# Patient Record
Sex: FEMALE | Race: WHITE | NOT HISPANIC OR LATINO | Employment: OTHER | URBAN - NONMETROPOLITAN AREA
[De-identification: names, ages, dates, MRNs, and addresses within clinical notes are randomized per-mention and may not be internally consistent; named-entity substitution may affect disease eponyms.]

---

## 2017-01-06 ENCOUNTER — AMBULATORY - GICH (OUTPATIENT)
Dept: FAMILY MEDICINE | Facility: OTHER | Age: 67
End: 2017-01-06

## 2017-01-09 ENCOUNTER — AMBULATORY - GICH (OUTPATIENT)
Dept: FAMILY MEDICINE | Facility: OTHER | Age: 67
End: 2017-01-09

## 2017-01-16 ENCOUNTER — OFFICE VISIT - GICH (OUTPATIENT)
Dept: FAMILY MEDICINE | Facility: OTHER | Age: 67
End: 2017-01-16

## 2017-01-16 DIAGNOSIS — R20.0 ANESTHESIA OF SKIN: ICD-10-CM

## 2017-01-17 ENCOUNTER — HOSPITAL ENCOUNTER (OUTPATIENT)
Dept: RADIOLOGY | Facility: OTHER | Age: 67
End: 2017-01-17
Attending: FAMILY MEDICINE

## 2017-01-17 DIAGNOSIS — R20.0 ANESTHESIA OF SKIN: ICD-10-CM

## 2017-01-18 ENCOUNTER — AMBULATORY - GICH (OUTPATIENT)
Dept: SCHEDULING | Facility: OTHER | Age: 67
End: 2017-01-18

## 2017-01-19 ENCOUNTER — OFFICE VISIT - GICH (OUTPATIENT)
Dept: FAMILY MEDICINE | Facility: OTHER | Age: 67
End: 2017-01-19

## 2017-01-19 DIAGNOSIS — R20.2 PARESTHESIA OF SKIN: ICD-10-CM

## 2017-01-19 DIAGNOSIS — R20.0 ANESTHESIA OF SKIN: ICD-10-CM

## 2017-01-20 ENCOUNTER — HOSPITAL ENCOUNTER (OUTPATIENT)
Dept: RADIOLOGY | Facility: OTHER | Age: 67
End: 2017-01-20
Attending: FAMILY MEDICINE

## 2017-01-20 DIAGNOSIS — R20.0 ANESTHESIA OF SKIN: ICD-10-CM

## 2017-01-20 DIAGNOSIS — R20.2 PARESTHESIA OF SKIN: ICD-10-CM

## 2017-01-26 ENCOUNTER — AMBULATORY - GICH (OUTPATIENT)
Dept: SCHEDULING | Facility: OTHER | Age: 67
End: 2017-01-26

## 2017-02-28 ENCOUNTER — AMBULATORY - GICH (OUTPATIENT)
Dept: SCHEDULING | Facility: OTHER | Age: 67
End: 2017-02-28

## 2017-02-28 ENCOUNTER — HISTORY (OUTPATIENT)
Dept: FAMILY MEDICINE | Facility: OTHER | Age: 67
End: 2017-02-28

## 2017-02-28 ENCOUNTER — OFFICE VISIT - GICH (OUTPATIENT)
Dept: FAMILY MEDICINE | Facility: OTHER | Age: 67
End: 2017-02-28

## 2017-02-28 DIAGNOSIS — G44.209 TENSION-TYPE HEADACHE, NOT INTRACTABLE: ICD-10-CM

## 2017-02-28 DIAGNOSIS — L98.9 DISORDER OF SKIN OR SUBCUTANEOUS TISSUE: ICD-10-CM

## 2017-02-28 DIAGNOSIS — M50.30 OTHER CERVICAL DISC DEGENERATION, UNSPECIFIED CERVICAL REGION: ICD-10-CM

## 2017-02-28 DIAGNOSIS — M54.17 RADICULOPATHY OF LUMBOSACRAL REGION: ICD-10-CM

## 2017-03-14 ENCOUNTER — AMBULATORY - GICH (OUTPATIENT)
Dept: SCHEDULING | Facility: OTHER | Age: 67
End: 2017-03-14

## 2017-03-15 ENCOUNTER — AMBULATORY - GICH (OUTPATIENT)
Dept: RADIOLOGY | Facility: OTHER | Age: 67
End: 2017-03-15

## 2017-03-15 DIAGNOSIS — M79.605 PAIN OF LEFT LEG: ICD-10-CM

## 2017-03-17 ENCOUNTER — HOSPITAL ENCOUNTER (OUTPATIENT)
Dept: RADIOLOGY | Facility: OTHER | Age: 67
End: 2017-03-17

## 2017-03-17 DIAGNOSIS — M79.605 PAIN OF LEFT LEG: ICD-10-CM

## 2017-03-21 ENCOUNTER — HISTORY (OUTPATIENT)
Dept: SURGERY | Facility: OTHER | Age: 67
End: 2017-03-21

## 2017-03-21 ENCOUNTER — AMBULATORY - GICH (OUTPATIENT)
Dept: SURGERY | Facility: OTHER | Age: 67
End: 2017-03-21

## 2017-03-21 DIAGNOSIS — L98.9 DISORDER OF SKIN OR SUBCUTANEOUS TISSUE: ICD-10-CM

## 2017-03-28 ENCOUNTER — OFFICE VISIT - GICH (OUTPATIENT)
Dept: SURGERY | Facility: OTHER | Age: 67
End: 2017-03-28

## 2017-03-28 DIAGNOSIS — Z85.828 PERSONAL HISTORY OF OTHER MALIGNANT NEOPLASM OF SKIN (CODE): ICD-10-CM

## 2017-03-28 DIAGNOSIS — Z48.3 AFTERCARE FOLLOWING SURGERY FOR NEOPLASM: ICD-10-CM

## 2017-03-31 ENCOUNTER — AMBULATORY - GICH (OUTPATIENT)
Dept: PHYSICAL THERAPY | Facility: OTHER | Age: 67
End: 2017-03-31

## 2017-03-31 DIAGNOSIS — M51.84 OTHER INTERVERTEBRAL DISC DISORDERS, THORACIC REGION: ICD-10-CM

## 2017-04-04 ENCOUNTER — HOSPITAL ENCOUNTER (OUTPATIENT)
Dept: PHYSICAL THERAPY | Facility: OTHER | Age: 67
Setting detail: THERAPIES SERIES
End: 2017-04-04

## 2017-04-04 DIAGNOSIS — M51.84 OTHER INTERVERTEBRAL DISC DISORDERS, THORACIC REGION: ICD-10-CM

## 2017-04-06 ENCOUNTER — HOSPITAL ENCOUNTER (OUTPATIENT)
Dept: PHYSICAL THERAPY | Facility: OTHER | Age: 67
Setting detail: THERAPIES SERIES
End: 2017-04-06

## 2017-04-11 ENCOUNTER — HOSPITAL ENCOUNTER (OUTPATIENT)
Dept: PHYSICAL THERAPY | Facility: OTHER | Age: 67
Setting detail: THERAPIES SERIES
End: 2017-04-11

## 2017-04-11 ENCOUNTER — COMMUNICATION - GICH (OUTPATIENT)
Dept: FAMILY MEDICINE | Facility: OTHER | Age: 67
End: 2017-04-11

## 2017-04-11 DIAGNOSIS — G44.209 TENSION-TYPE HEADACHE, NOT INTRACTABLE: ICD-10-CM

## 2017-04-24 ENCOUNTER — AMBULATORY - GICH (OUTPATIENT)
Dept: SCHEDULING | Facility: OTHER | Age: 67
End: 2017-04-24

## 2017-05-09 ENCOUNTER — AMBULATORY - GICH (OUTPATIENT)
Dept: SURGERY | Facility: OTHER | Age: 67
End: 2017-05-09

## 2017-05-09 ENCOUNTER — HOSPITAL ENCOUNTER (OUTPATIENT)
Dept: PHYSICAL THERAPY | Facility: OTHER | Age: 67
Setting detail: THERAPIES SERIES
End: 2017-05-09

## 2017-05-09 ENCOUNTER — HISTORY (OUTPATIENT)
Dept: SURGERY | Facility: OTHER | Age: 67
End: 2017-05-09

## 2017-05-09 DIAGNOSIS — Z85.828 PERSONAL HISTORY OF OTHER MALIGNANT NEOPLASM OF SKIN (CODE): ICD-10-CM

## 2017-05-11 ENCOUNTER — AMBULATORY - GICH (OUTPATIENT)
Dept: SCHEDULING | Facility: OTHER | Age: 67
End: 2017-05-11

## 2017-05-16 ENCOUNTER — AMBULATORY - GICH (OUTPATIENT)
Dept: SCHEDULING | Facility: OTHER | Age: 67
End: 2017-05-16

## 2017-05-16 ENCOUNTER — HOSPITAL ENCOUNTER (OUTPATIENT)
Dept: PHYSICAL THERAPY | Facility: OTHER | Age: 67
Setting detail: THERAPIES SERIES
End: 2017-05-16

## 2017-05-19 ENCOUNTER — OFFICE VISIT - GICH (OUTPATIENT)
Dept: SURGERY | Facility: OTHER | Age: 67
End: 2017-05-19

## 2017-05-19 ENCOUNTER — HISTORY (OUTPATIENT)
Dept: SURGERY | Facility: OTHER | Age: 67
End: 2017-05-19

## 2017-05-19 DIAGNOSIS — Z48.817 ENCOUNTER FOR SURGICAL AFTERCARE FOLLOWING SURGERY OF SKIN OR SUBCUTANEOUS TISSUE: ICD-10-CM

## 2017-05-19 DIAGNOSIS — Z85.828 PERSONAL HISTORY OF OTHER MALIGNANT NEOPLASM OF SKIN (CODE): ICD-10-CM

## 2017-07-17 ENCOUNTER — COMMUNICATION - GICH (OUTPATIENT)
Dept: FAMILY MEDICINE | Facility: OTHER | Age: 67
End: 2017-07-17

## 2017-07-17 DIAGNOSIS — G44.209 TENSION-TYPE HEADACHE, NOT INTRACTABLE: ICD-10-CM

## 2017-07-17 DIAGNOSIS — M50.30 OTHER CERVICAL DISC DEGENERATION, UNSPECIFIED CERVICAL REGION: ICD-10-CM

## 2017-08-21 ENCOUNTER — COMMUNICATION - GICH (OUTPATIENT)
Dept: FAMILY MEDICINE | Facility: OTHER | Age: 67
End: 2017-08-21

## 2017-08-21 DIAGNOSIS — G44.209 TENSION-TYPE HEADACHE, NOT INTRACTABLE: ICD-10-CM

## 2017-09-26 ENCOUNTER — COMMUNICATION - GICH (OUTPATIENT)
Dept: FAMILY MEDICINE | Facility: OTHER | Age: 67
End: 2017-09-26

## 2017-09-26 DIAGNOSIS — G44.209 TENSION-TYPE HEADACHE, NOT INTRACTABLE: ICD-10-CM

## 2017-10-11 ENCOUNTER — HOSPITAL ENCOUNTER (OUTPATIENT)
Dept: RADIOLOGY | Facility: OTHER | Age: 67
End: 2017-10-11
Attending: FAMILY MEDICINE

## 2017-10-11 ENCOUNTER — HISTORY (OUTPATIENT)
Dept: RADIOLOGY | Facility: OTHER | Age: 67
End: 2017-10-11

## 2017-10-11 DIAGNOSIS — Z12.31 ENCOUNTER FOR SCREENING MAMMOGRAM FOR MALIGNANT NEOPLASM OF BREAST: ICD-10-CM

## 2017-10-12 ENCOUNTER — OFFICE VISIT - GICH (OUTPATIENT)
Dept: FAMILY MEDICINE | Facility: OTHER | Age: 67
End: 2017-10-12

## 2017-10-12 ENCOUNTER — HISTORY (OUTPATIENT)
Dept: FAMILY MEDICINE | Facility: OTHER | Age: 67
End: 2017-10-12

## 2017-10-12 DIAGNOSIS — E78.5 HYPERLIPIDEMIA: ICD-10-CM

## 2017-10-12 DIAGNOSIS — M89.9 DISORDER OF BONE: ICD-10-CM

## 2017-10-12 DIAGNOSIS — Z23 ENCOUNTER FOR IMMUNIZATION: ICD-10-CM

## 2017-10-12 DIAGNOSIS — C82.50: ICD-10-CM

## 2017-10-12 DIAGNOSIS — M94.9 DISORDER OF CARTILAGE: ICD-10-CM

## 2017-10-12 DIAGNOSIS — M50.30 OTHER CERVICAL DISC DEGENERATION, UNSPECIFIED CERVICAL REGION: ICD-10-CM

## 2017-10-12 DIAGNOSIS — G47.00 INSOMNIA: ICD-10-CM

## 2017-10-12 ASSESSMENT — ANXIETY QUESTIONNAIRES
6. BECOMING EASILY ANNOYED OR IRRITABLE: NOT AT ALL
1. FEELING NERVOUS, ANXIOUS, OR ON EDGE: NOT AT ALL
3. WORRYING TOO MUCH ABOUT DIFFERENT THINGS: NOT AT ALL
4. TROUBLE RELAXING: NOT AT ALL
5. BEING SO RESTLESS THAT IT IS HARD TO SIT STILL: NOT AT ALL
7. FEELING AFRAID AS IF SOMETHING AWFUL MIGHT HAPPEN: NOT AT ALL
GAD7 TOTAL SCORE: 0
2. NOT BEING ABLE TO STOP OR CONTROL WORRYING: NOT AT ALL

## 2017-10-12 ASSESSMENT — PATIENT HEALTH QUESTIONNAIRE - PHQ9: SUM OF ALL RESPONSES TO PHQ QUESTIONS 1-9: 0

## 2017-10-26 ENCOUNTER — COMMUNICATION - GICH (OUTPATIENT)
Dept: FAMILY MEDICINE | Facility: OTHER | Age: 67
End: 2017-10-26

## 2017-10-26 DIAGNOSIS — G44.209 TENSION-TYPE HEADACHE, NOT INTRACTABLE: ICD-10-CM

## 2017-10-31 ENCOUNTER — AMBULATORY - GICH (OUTPATIENT)
Dept: FAMILY MEDICINE | Facility: OTHER | Age: 67
End: 2017-10-31

## 2017-10-31 DIAGNOSIS — G47.00 INSOMNIA: ICD-10-CM

## 2017-11-24 ENCOUNTER — COMMUNICATION - GICH (OUTPATIENT)
Dept: FAMILY MEDICINE | Facility: OTHER | Age: 67
End: 2017-11-24

## 2017-11-24 DIAGNOSIS — G44.209 TENSION-TYPE HEADACHE, NOT INTRACTABLE: ICD-10-CM

## 2017-11-24 DIAGNOSIS — M50.30 OTHER CERVICAL DISC DEGENERATION, UNSPECIFIED CERVICAL REGION: ICD-10-CM

## 2017-11-25 ENCOUNTER — HISTORY (OUTPATIENT)
Dept: EMERGENCY MEDICINE | Facility: OTHER | Age: 67
End: 2017-11-25

## 2017-11-25 ENCOUNTER — HOSPITAL ENCOUNTER (OUTPATIENT)
Dept: MEDSURG UNIT | Facility: OTHER | Age: 67
Setting detail: OBSERVATION
Discharge: HOME | End: 2017-11-26
Admitting: FAMILY MEDICINE

## 2017-11-25 DIAGNOSIS — R11.2 NAUSEA WITH VOMITING: ICD-10-CM

## 2017-11-25 DIAGNOSIS — R10.9 ABDOMINAL PAIN: ICD-10-CM

## 2017-11-25 LAB
A/G RATIO - HISTORICAL: 2.2 (ref 1–2)
ABSOLUTE BASOPHILS - HISTORICAL: 0 THOU/CU MM
ABSOLUTE EOSINOPHILS - HISTORICAL: 0.1 THOU/CU MM
ABSOLUTE IMMATURE GRANULOCYTES(METAS,MYELOS,PROS) - HISTORICAL: 0 THOU/CU MM
ABSOLUTE LYMPHOCYTES - HISTORICAL: 1.3 THOU/CU MM (ref 0.9–2.9)
ABSOLUTE MONOCYTES - HISTORICAL: 0.6 THOU/CU MM
ABSOLUTE NEUTROPHILS - HISTORICAL: 5 THOU/CU MM (ref 1.7–7)
ALBUMIN SERPL-MCNC: 5.1 G/DL (ref 3.5–5.7)
ALP SERPL-CCNC: 45 IU/L (ref 34–104)
ALT (SGPT) - HISTORICAL: 24 IU/L (ref 7–52)
ANION GAP - HISTORICAL: 11 (ref 5–18)
AST SERPL-CCNC: 23 IU/L (ref 13–39)
BACTERIA URINE: NORMAL BACTERIA/HPF
BASOPHILS # BLD AUTO: 0.1 %
BILIRUB SERPL-MCNC: 0.5 MG/DL (ref 0.3–1)
BILIRUB UR QL: NEGATIVE
BUN SERPL-MCNC: 11 MG/DL (ref 7–25)
BUN/CREAT RATIO - HISTORICAL: 16
CALCIUM SERPL-MCNC: 9.5 MG/DL (ref 8.6–10.3)
CHLORIDE SERPLBLD-SCNC: 96 MMOL/L (ref 98–107)
CLARITY, URINE: CLEAR CLARITY
CO2 SERPL-SCNC: 24 MMOL/L (ref 21–31)
COLOR UR: YELLOW COLOR
CREAT SERPL-MCNC: 0.69 MG/DL (ref 0.7–1.3)
EOSINOPHIL NFR BLD AUTO: 1.4 %
EPITHELIAL CELLS: NORMAL EPI/HPF
ERYTHROCYTE [DISTWIDTH] IN BLOOD BY AUTOMATED COUNT: 12.3 % (ref 11.5–15.5)
GFR IF NOT AFRICAN AMERICAN - HISTORICAL: >60 ML/MIN/1.73M2
GLOBULIN - HISTORICAL: 2.3 G/DL (ref 2–3.7)
GLUCOSE SERPL-MCNC: 110 MG/DL (ref 70–105)
GLUCOSE URINE: NEGATIVE MG/DL
HCT VFR BLD AUTO: 35.5 % (ref 33–51)
HEMOGLOBIN: 12.2 G/DL (ref 12–16)
IMMATURE GRANULOCYTES(METAS,MYELOS,PROS) - HISTORICAL: 0.3 %
KETONES UR QL: NEGATIVE MG/DL
LEUKOCYTE ESTERASE URINE: ABNORMAL
LIPASE SERPL-CCNC: 25.4 IU/L (ref 11–82)
LYMPHOCYTES NFR BLD AUTO: 18.2 % (ref 20–44)
MCH RBC QN AUTO: 33.1 PG (ref 26–34)
MCHC RBC AUTO-ENTMCNC: 34.4 G/DL (ref 32–36)
MCV RBC AUTO: 96 FL (ref 80–100)
MONOCYTES NFR BLD AUTO: 8.5 %
NEUTROPHILS NFR BLD AUTO: 71.5 % (ref 42–72)
NITRITE UR QL STRIP: NEGATIVE
OCCULT BLOOD,URINE - HISTORICAL: ABNORMAL
PH UR: 6 [PH]
PLATELET # BLD AUTO: 202 THOU/CU MM (ref 140–440)
PMV BLD: 9.1 FL (ref 6.5–11)
POTASSIUM SERPL-SCNC: 3.9 MMOL/L (ref 3.5–5.1)
PROT SERPL-MCNC: 7.4 G/DL (ref 6.4–8.9)
PROTEIN QUALITATIVE,URINE - HISTORICAL: NEGATIVE MG/DL
RBC - HISTORICAL: NORMAL /HPF
RED BLOOD COUNT - HISTORICAL: 3.69 MIL/CU MM (ref 4–5.2)
SODIUM SERPL-SCNC: 131 MMOL/L (ref 133–143)
SP GR UR STRIP: 1.02
UROBILINOGEN,QUALITATIVE - HISTORICAL: NORMAL EU/DL
WBC - HISTORICAL: NORMAL /HPF
WHITE BLOOD COUNT - HISTORICAL: 6.9 THOU/CU MM (ref 4.5–11)

## 2017-11-26 LAB
ABSOLUTE BASOPHILS - HISTORICAL: 0 THOU/CU MM
ABSOLUTE EOSINOPHILS - HISTORICAL: 0 THOU/CU MM
ABSOLUTE IMMATURE GRANULOCYTES(METAS,MYELOS,PROS) - HISTORICAL: 0 THOU/CU MM
ABSOLUTE LYMPHOCYTES - HISTORICAL: 1.2 THOU/CU MM (ref 0.9–2.9)
ABSOLUTE MONOCYTES - HISTORICAL: 0.6 THOU/CU MM
ABSOLUTE NEUTROPHILS - HISTORICAL: 3.8 THOU/CU MM (ref 1.7–7)
ANION GAP - HISTORICAL: 13 (ref 5–18)
BASOPHILS # BLD AUTO: 0.2 %
BUN SERPL-MCNC: 7 MG/DL (ref 7–25)
BUN/CREAT RATIO - HISTORICAL: 14
CALCIUM SERPL-MCNC: 8.9 MG/DL (ref 8.6–10.3)
CHLORIDE SERPLBLD-SCNC: 101 MMOL/L (ref 98–107)
CO2 SERPL-SCNC: 21 MMOL/L (ref 21–31)
CREAT SERPL-MCNC: 0.5 MG/DL (ref 0.7–1.3)
EOSINOPHIL NFR BLD AUTO: 0.5 %
ERYTHROCYTE [DISTWIDTH] IN BLOOD BY AUTOMATED COUNT: 12.2 % (ref 11.5–15.5)
GFR IF NOT AFRICAN AMERICAN - HISTORICAL: >60 ML/MIN/1.73M2
GLUCOSE SERPL-MCNC: 100 MG/DL (ref 70–105)
HCT VFR BLD AUTO: 34.6 % (ref 33–51)
HEMOGLOBIN: 11.9 G/DL (ref 12–16)
IMMATURE GRANULOCYTES(METAS,MYELOS,PROS) - HISTORICAL: 0.2 %
LYMPHOCYTES NFR BLD AUTO: 21.7 % (ref 20–44)
MCH RBC QN AUTO: 33.1 PG (ref 26–34)
MCHC RBC AUTO-ENTMCNC: 34.4 G/DL (ref 32–36)
MCV RBC AUTO: 96 FL (ref 80–100)
MONOCYTES NFR BLD AUTO: 10.3 %
NEUTROPHILS NFR BLD AUTO: 67.1 % (ref 42–72)
PLATELET # BLD AUTO: 205 THOU/CU MM (ref 140–440)
PMV BLD: 9.6 FL (ref 6.5–11)
POTASSIUM SERPL-SCNC: 3.6 MMOL/L (ref 3.5–5.1)
RED BLOOD COUNT - HISTORICAL: 3.6 MIL/CU MM (ref 4–5.2)
SODIUM SERPL-SCNC: 135 MMOL/L (ref 133–143)
WHITE BLOOD COUNT - HISTORICAL: 5.6 THOU/CU MM (ref 4.5–11)

## 2017-11-30 ENCOUNTER — OFFICE VISIT - GICH (OUTPATIENT)
Dept: FAMILY MEDICINE | Facility: OTHER | Age: 67
End: 2017-11-30

## 2017-11-30 ENCOUNTER — HISTORY (OUTPATIENT)
Dept: FAMILY MEDICINE | Facility: OTHER | Age: 67
End: 2017-11-30

## 2017-11-30 DIAGNOSIS — K52.9 NONINFECTIVE GASTROENTERITIS AND COLITIS: ICD-10-CM

## 2017-12-27 NOTE — PROGRESS NOTES
Patient Information     Patient Name MRN Sex DOB Hultman, Corinne G [id number] Female 1950      Progress Notes by Amaury Guevara MD at 10/12/2017  1:30 PM     Author:  Amaury Guevara MD Service:  (none) Author Type:  Physician     Filed:  10/12/2017  3:07 PM Encounter Date:  10/12/2017 Status:  Signed     :  Amaury Guevara MD (Physician)            HPI-Comes in today for comprehensive evaluation.  She's been feeling fine. She continues to work 2 days a week 2 weeks out of each month. She was diagnosed earlier this year with a herniated disc in the thoracic spine and has some numbness in the leg and some radicular pain around the lower rib area but this is well tolerated. Her meds remain the same. She has no new lumps or bumps, and she does her oncology follow-up with Dr. Cuenca in March. Overall she feels well.    She had a mammogram yesterday. She gets lab done through Dr. Cuenca. She is due for a flu shot.    She does note a significant sleep disturbance. She states that she falls asleep every night which he first goes to bed around 10 or so and then wakes up at 1:30 and often times can't get back to sleep. She said her mind is just thinking about all kinds of different things that seem to cause insomnia. No sleep apnea.    ROS:  See HPI.  Her weight is been stable. No fever, chills, or night sweats. Eyes, ENT, cardiopulmonary, GI, , rheumatologic, hematologic, skin, lymph, neurologic, psychiatric and endocrine are all negative other than what is noted in the history of present illness.    Past Medical History:     Diagnosis  Date     Actinic keratosis 2013     Closed fracture of lateral malleolus 2010     Degeneration of cervical intervertebral disc      Disorder of bone and cartilage, unspecified      Menopause age 50     Open fracture of lateral malleolus      Osteoporosis     on Fosamax for 17 years--stopped       Other malignant lymphomas, unspecified site,  extranodal and solid organ sites      Rheumatic fever 10+ years ago     Tension headache      Vertigo      Past Surgical History:      Procedure  Laterality Date     BREAST BIOPSY  mid     benign       CERVICAL FUSION  2012    C6-7 on left with discectomy       COLONOSCOPY SCREENING  age 55    normal       COLONOSCOPY SCREENING  12/10/2015    lnormal--repeat 10 years       LUMBAR LAMINECTOMY      L4-5       [place] ECTOP PREG BY LAPAROSCOPE Left     left tube and ovary removed       SKIN BIOPSY      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       TONSILLECTOMY       TUBAL LIGATION      FOR ECTOPIC PREGNANCY       Family History       Problem   Relation Age of Onset     Heart Disease  Mother       after cardiac bypass, age 71       Heart Disease  Father      congestive heart failure        Other  Other      headaches, otherwise A&W       Cancer-breast  Other      Social History       Substance Use Topics         Smoking status:   Never Smoker     Smokeless tobacco:   Never Used     Alcohol use   0.6 oz/week     1 Standard drinks or equivalent per week        Comment: once weekly she will have a glass of beer or wine with dinner.       No Known Allergies    Current Outpatient Prescriptions:      acetaminophen-caffeine-butalbital (FIORICET) 325-40-50 mg tablet, TAKE 1 TO 2 TABLETS BY MOUTH EVERY 4 TO 6 HOURS AS NEEDED FOR HEADACHE. MAX OF 6 DOSES PER DAY., Disp: 90 tablet, Rfl: 0     acetaminophen-codeine, 300-30 mg, (TYLENOL #3) tablet, TAKE 1 TO 2 TABLETS BY MOUTH EVERY 6 HOURS IF NEEDED, NO MORE THAN 4,000MG OF ACETAMINOPHEN DAILY, Disp: 100 tablet, Rfl: 3     CALCIUM CARBONATE (CALCIUM 600 ORAL), Take  by mouth 2 times daily., Disp: , Rfl:      celecoxib (CELEBREX) 200 mg capsule, Take 1 capsule by mouth once daily with a meal., Disp: 90 capsule, Rfl: 3     Cholecalciferol, Vitamin D3, (VITAMIN D-3) 2,000 unit tablet, Take 1 tablet by mouth once daily., Disp: , Rfl: 0      Cranberry Extract 500 mg cap, Take  by mouth once daily., Disp: , Rfl:      ibuprofen (ADVIL; MOTRIN) 200 mg tablet, 600-800 mg prn, Disp: , Rfl: 0     traZODone (DESYREL) 100 mg tablet, Take 0.5-1 tablets by mouth at bedtime if needed for Sleep., Disp: 90 tablet, Rfl: 4  Medications have been reviewed by me and are current to the best of my knowledge and ability.      EXAM:she is a pleasant healthy-appearing 67 y.o. female in no apparent distress.  She answers questions appropriately and appears well-kempt.  HEENT exam is within normal limits.  .  Neck is supple with good carotid pulses.  No bruits are heard.  No thyromegaly or adenopathy.  Lungs are clear with good air movement.  No rales, rhonchi, or wheezes are heard--  Breasts are normal in appearance, palpably free of masses.    There is no skin dimpling or nipple changes and axilla are negative. She has mild bilateral fibronodular changes. There is no tenderness.  Cardiac exam reveals a regular rhythm with no murmur or gallop appreciated.  Peripheral pulses are equal and strong  Abdomen-soft and nontender with no hepatosplenomegaly or masses.  Bowel sounds are normal and no bruits are heard  Back is straight with no scoliosis or CVA tenderness.       Extremities no edema. Good distal pulses.  Neurologic exam is intact and nonfocal  Skin is free of significant lesions.  No rashes are seen  Lymph nodes are nonpalpable in cervical, axillary, and inguinal areas. There are no supraclavicular or infraclavicular nodes in the spleen is not palpable  Psychiatric: Alert and oriented with normal mood and affect    Lab-these will be done through Dr. Cuenca at her oncology appointment. Lipid panel 2 years ago was normal.  Imaging-mammogram was done yesterday and the result is pending    Assessment-preventive healthcare-immunizations are all up-to-date and mammogram is been done and colonoscopy is up-to-date. Flu vaccine is given today.    History of lymphoma-in  remission    Osteopenia-she is taking calcium and vitamin D    Remote history of hyperlipidemia-lipids have been normal.    Cervical disc disease-stable    Insomnia-trial of trazodone    Thoracic disc disease-stable    Plan-flu vaccine given. She'll get labs done at her oncology appointment. Medications refilled. Trazodone given for sleep. Follow-up at yearly intervals.    Amaury Guevara MD ....................  10/12/2017   3:03 PM

## 2017-12-27 NOTE — PROGRESS NOTES
Patient Information     Patient Name MRN Sex DOB Hultman, Corinne G [id number] Female 1950      Progress Notes by Amy Benitez RN at 2017  6:48 PM     Author:  Amy Benitez RN Service:  (none) Author Type:  NURS- Registered Nurse     Filed:  2017  6:48 PM Date of Service:  2017  6:48 PM Status:  Signed     :  Amy Benitez RN (NURS- Registered Nurse)            Problem: GASTROINTESTINAL  Goal: MAINTAIN OR PROMOTE RETURN TO OPTIMAL GI FUNCTION  Outcome: Problem reviewed and goal still appropriate  Patient had been given Zofran and was still nauseated, Dr. Conrad notified and an order for Compazine ordered and given. Patient has been resting since. Pain managed with Dilaudid.  Amy Benitez RN   ........2017  6:46 PM

## 2017-12-28 NOTE — TELEPHONE ENCOUNTER
Patient Information     Patient Name MRN Sex DOB Hultman, Corinne G [id number] Female 1950      Telephone Encounter by Enid Aguirre RN at 2017  7:47 AM     Author:  Enid Aguirre RN Service:  (none) Author Type:  NURS- Registered Nurse     Filed:  2017  7:48 AM Encounter Date:  2017 Status:  Signed     :  Enid Aguirre RN (NURS- Registered Nurse)            This is a Refill request from: [facility]  Name of Medication: acetaminophen-caffeine-butalbital (FIORICET) 325-40-50 mg tablet  TAKE 1 TO 2 TABLETS BY MOUTH EVERY 4 TO 6 HOURS AS NEEDED FOR HEADACHE. MAX OF 6 DOSES PER DAY.  Quantity requested: 90  Last fill date: 17  Due for refill: yes  Last visit with AMAURY MOTA was on: 2017 in [facility]  PCP:  Amaury Mota MD  Controlled Substance Agreement:  None on file    Diagnosis r/t this medication request: tension headache     Unable to complete prescription refill per RN Medication Refill Policy.................... ENID AGUIRRE RN ....................  2017   7:47 AM

## 2017-12-28 NOTE — PROGRESS NOTES
Patient Information     Patient Name MRN Sex DOB Hultman, Corinne G [id number] Female 1950      Progress Notes by Tawny Andrade R.T. (ARRT) at 10/11/2017  2:38 PM     Author:  Tawny Andrade R.T. (ARRT) Service:  (none) Author Type:  (none)     Filed:  10/11/2017  2:38 PM Date of Service:  10/11/2017  2:38 PM Status:  Signed     :  Tawny Andrade R.T. (ARRT) ([facility] - Registered Radiologic Technologist)            Falls Risk Criteria:    Age 65 and older or under age 4        Sensory deficits    Poor vision    Use of ambulatory aides    Impaired judgment    Unable to walk independently    Meets High Risk criteria for falls:  Yes               1.  Do you have dizziness or vertigo?    no                    2.  Do you need help standing or walking?   no                 3.  Have you fallen within the last 6 months?    no           4.  Has the patient been fasting?      no       If any risks are marked Yes, the following interventions are utilized:    Do not leave patient unattended     Assist patient in the dressing room and bathroom    Have ambulatory aides available throughout procedure    Involve patient s family if available          '

## 2017-12-28 NOTE — PROGRESS NOTES
Patient Information     Patient Name MRN Sex DOB Hultman, Corinne G [id number] Female 1950      Progress Notes by Amaury Guevara MD at 2017 10:00 AM     Author:  Amaury Guevara MD Service:  (none) Author Type:  Physician     Filed:  2017 12:36 PM Encounter Date:  2017 Status:  Signed     :  Amaury Guevara MD (Physician)            SUBJECTIVE:  67 y.o. female who presents for follow-up of her hospitalization for presumed gastroenteritis. She's feeling fine now, back to normal. She went home on  and was back to work on Tuesday. 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 She's having no GI problems now and no other complaints.    She states that she was up for 2 nights pacing because of the pain.    Additional Review of Systems: see HPI:   No new symptoms otherwise    Past Medical History:     Diagnosis  Date     Actinic keratosis 2013     Closed fracture of lateral malleolus 2010     Degeneration of cervical intervertebral disc      Disorder of bone and cartilage, unspecified      Menopause age 50     Open fracture of lateral malleolus      Osteoporosis     on Fosamax for 17 years--stopped       Other malignant lymphomas, unspecified site, extranodal and solid organ sites 2011     Rheumatic fever 10+ years ago     Tension headache      Vertigo         Current Outpatient Prescriptions       Medication  Sig Dispense Refill     acetaminophen-caffeine-butalbital (FIORICET) 325-40-50 mg tablet TAKE 1 TO 2 TABLETS BY MOUTH EVERY 4 TO 6 HOURS AS NEEDED FOR HEADACHE. MAX OF 6 DOSES PER DAY. 90 tablet 3     acetaminophen-codeine, 300-30 mg, (TYLENOL #3) tablet TAKE 1-2 TABLETS BY MOUTH EVERY 6 HOURS IF NEEDED. NO MORE THAN 4,000MG OF ACETAMINOPHEN DAILY 100 tablet 0     CALCIUM CARBONATE (CALCIUM 600 ORAL) Take  by mouth 2 times daily.       celecoxib (CELEBREX) 200 mg capsule Take 1 capsule by mouth once daily with a meal. 90  capsule 3     Cholecalciferol, Vitamin D3, (VITAMIN D-3) 2,000 unit tablet Take 1 tablet by mouth once daily.  0     Cranberry Extract 500 mg cap Take  by mouth once daily.       ibuprofen (ADVIL; MOTRIN) 200 mg tablet 600-800 mg prn  0     ondansetron (ZOFRAN) 4 mg tablet Take 1 tablet by mouth every 8 hours if needed for Nausea/Vomiting. 30 tablet 0     POLYETHYLENE GLYCOL 3350 (MIRALAX ORAL) Take 1 Cap by mouth once daily if needed.       ranitidine (ZANTAC) 150 mg tablet Take 1 tablet by mouth 2 times daily. 30 tablet 0     zolpidem (AMBIEN) 5 mg tablet Take 0.5-1 tablets by mouth at bedtime if needed for Sleep. 30 tablet 5     No current facility-administered medications for this visit.      Medications have been reviewed by me and are current to the best of my knowledge and ability.      Allergies as of 11/30/2017      (No Known Allergies)        OBJECTIVE:  /80  Pulse 60  Wt 51.2 kg (112 lb 12.8 oz)  BMI 21.31 kg/m2  EXAM:  General Appearance: Pleasant, alert, appropriate appearance for age. No acute distress  Chest/Respiratory Exam: Normal.  Cardiovascular Exam: Normal.  Gastrointestinal Exam: Soft, nontender, no abnormal masses or organomegaly.  CT report and laboratory studies that were done in the hospital were reviewed with her.    ASSESSMENT/PLAN:  Gastroenteritis-resolved. She's been taking ranitidine and will gradually stop this. No new treatments are recommended and she will not need follow-up for this problem unless she has further difficulties.  Amauyr Guevara MD ....................  11/30/2017   12:35 PM

## 2017-12-28 NOTE — CARE COORDINATION
Patient Information     Patient Name MRN Sex DOB Hultman, Corinne G [id number] Female 1950      Case Mgmt by Mallory Avila RN at 2017  9:15 AM     Author:  Mallory Avila RN Service:  (none) Author Type:  NURS- Registered Nurse     Filed:  2017  9:15 AM Date of Service:  2017  9:15 AM Status:  Signed     :  Mallory Avila RN (NURS- Registered Nurse)            First and second level admission reviews completed by EHR.  Patient is appropriate for observation services.  Mallory Avila RN ....................  2017   9:15 AM

## 2017-12-28 NOTE — TELEPHONE ENCOUNTER
Patient Information     Patient Name MRN Sex DOB Hultman, Corinne G [id number] Female 1950      Telephone Encounter by Teresita Conrad RN at 10/30/2017 10:03 AM     Author:  Teresita Conrad RN Service:  (none) Author Type:  NURS- Registered Nurse     Filed:  10/30/2017 10:38 AM Encounter Date:  10/26/2017 Status:  Signed     :  Teresita Conrad RN (NURS- Registered Nurse)            ,  Pt is requesting refill for Fioricet.  Pt was last seen 10/12/2017. Medications appear to have been addressed, 10/03/2017 medication last filled #90.  Please review and advise or sign if appropriate.  Medication unique'd up per refill request    This is a Refill request from: [facility]  Name of Medication: Fioricet  Quantity requested: 90  Last fill date: 2017  Due for refill: yes  Last visit with AMAURY MOTA was on: 10/12/2017 in [facility]  PCP:  Amaury Mota MD  Controlled Substance Agreement: na  Diagnosis r/t this medication request: tension headaches     Unable to complete prescription refill per RN Medication Refill Policy.................... Teresita Conrad RN ....................  10/30/2017   10:04 AM

## 2017-12-28 NOTE — TELEPHONE ENCOUNTER
Patient Information     Patient Name MRN Sex DOB Hultman, Corinne G [id number] Female 1950      Telephone Encounter by Teresita Conrad RN at 2017  3:59 PM     Author:  Teresita Conrad RN Service:  (none) Author Type:  NURS- Registered Nurse     Filed:  2017  4:12 PM Encounter Date:  2017 Status:  Signed     :  Teresita Conrad RN (NURS- Registered Nurse)            This is a Refill request from: [facility]  Name of Medication: Fioricet  Quantity requested: 90  Last fill date: 2017  Due for refill: yes  Last visit with AMAURY MOTA was on: 2017 in [facility]  PCP:  Amaury Mota MD  Controlled Substance Agreement: None on file  Diagnosis r/t this medication request: Tension headache     Unable to complete prescription refill per RN Medication Refill Policy.................... Teresita Conrad RN ....................  2017   3:59 PM

## 2017-12-28 NOTE — TELEPHONE ENCOUNTER
Patient Information     Patient Name MRN Sex DOB Hultman, Corinne G [id number] Female 1950      Telephone Encounter by Cece Orta RN at 2017  2:11 PM     Author:  Cece Orta RN Service:  (none) Author Type:  NURS- Registered Nurse     Filed:  2017  2:38 PM Encounter Date:  2017 Status:  Signed     :  Cece Orta RN (NURS- Registered Nurse)            This is a Refill request from: [facility]  Name of Medication: acetaminophen-codeine 300-30mg  Quantity requested: 100  Last fill date: 17.  Due for refill: Yes  Last visit with AMAURY MOTA was on: 2017 in [facility]  PCP:  Amaury Mota MD  Controlled Substance Agreement:  Not on File  Diagnosis r/t this medication request: Degeneration of cervical intervertebral disc, Tension headache    Pt was last seen on 2017 for diagnosis listed above.  Due for follow up in 2017.    Unable to complete prescription refill per RN Medication Refill Policy.................... Cece Orta RN ....................  2017   2:13 PM

## 2017-12-28 NOTE — PROGRESS NOTES
Patient Information     Patient Name MRN Sex DOB Hultman, Corinne G [id number] Female 1950      Progress Notes by Leandra Alexandra PharmD at 2017  2:51 PM     Author:  Leandra Alexandra PharmD Service:  (none) Author Type:  PHARM- Pharmacist     Filed:  2017  7:22 PM Date of Service:  2017  2:51 PM Status:  Signed     :  Leandra Alexandra PharmD (PHARM- Pharmacist)            Pharmacy: Discharge Counseling and Medication Reconciliation    Corinne G Hultman  [address] [facility] [address]    Home Phone [phone]   Work Phone Not on file.   Mobile [phone]     67 y.o. female  PCP:Amaury Guevara MD    No Known Allergies    Discharge Counseling:    Pharmacist met with patient (and/or family) today to review the medication portion of the After Visit Summary (with an emphasis on NEW medications) and to address patient's questions/concerns.     Summary of Education: Met with patient at time of discharge to review new medications including ondansetron ODT and ranitidine. Discussed indications, directions for use, and possible side effects of each medication. Patient expressed understanding and all questions and concerns were addressed.    Materials Provided:   MedCounselor sheets printed from Clinical Pharmacology on: Ondansetron ODT and Ranitidine    Discharge Medication Reconciliation:    Leandra Alexandra PharmD has reviewed the patient's discharge medication orders and has compared them to the inpatient medication administration record and to what the patient was taking prior to admission- any discrepancies have been resolved.     It has been determined that the patient has an adequate supply of medications available or which can be obtained from the patient's preferred pharmacy, which she has confirmed as: [facility].     Thank you for the consult.     Leandra Alexandra PharmD ....................  2017   2:51 PM

## 2017-12-28 NOTE — PROGRESS NOTES
Patient Information     Patient Name MRN Sex DOB Hultman, Corinne G [id number] Female 1950      Progress Notes by Cynthia Altamirano RN at 2017  4:48 AM     Author:  Cynthia Altamirano RN Service:  (none) Author Type:  NURS- Registered Nurse     Filed:  2017  4:48 AM Date of Service:  2017  4:48 AM Status:  Signed     :  Cynthia Altamirano RN (NURS- Registered Nurse)            Problem: PAIN  Goal: VERBALIZES/DISPLAYS ADEQUATE COMFORT LEVEL OR BASELINE COMFORT LEVEL  Outcome: Problem reviewed and goal still appropriate  Patient started feeling better around midnight. Denies pain or nausea this morning. Tolerated ice chips all night and eating jello this morning.

## 2017-12-28 NOTE — PROGRESS NOTES
Patient Information     Patient Name MRN Sex DOB Hultman, Corinne G [id number] Female 1950      Progress Notes by Joan Perez RN at 2017  3:09 PM     Author:  Joan Perez RN Service:  (none) Author Type:  NURS- Registered Nurse     Filed:  2017  3:10 PM Date of Service:  2017  3:09 PM Status:  Signed     :  Joan Perez RN (NURS- Registered Nurse)            Discharge Note    Data:  Corinne G Hultman has been discharged home at 1500 via ambulatory accompanied by Family.      Action:  Written discharge/follow-up instructions were provided to patient. Prescriptions were written and sent with patient.  Belongings sent with patient. Medications from home sent with patient/family: No  Equipment none .     Response:  Patient verbalized understanding of discharge instructions, reason for discharge, and necessary follow-up appointments.

## 2017-12-28 NOTE — TELEPHONE ENCOUNTER
Patient Information     Patient Name MRN Sex DOB Hultman, Corinne G [id number] Female 1950      Telephone Encounter by Teresita Conrad RN at 2017  1:07 PM     Author:  Teresita Conrad RN Service:  (none) Author Type:  NURS- Registered Nurse     Filed:  2017  1:20 PM Encounter Date:  2017 Status:  Signed     :  Teresita Conrad RN (NURS- Registered Nurse)            ,  Pt is requesting a refill of Tylenol #3.  Last filled 2017 #100 #3.  Pt was last seen 10/12/2017 and instructed to follow up at yearly intervals.  Please review and sign if appropriate.    This is a Refill request from: [facility]  Name of Medication: Tylenol #3  Quantity requested: 100  Last fill date: 2017  Due for refill: yes  Last visit with AMAURY MOTA was on: 10/12/2017 in [facility]  PCP:  Amaury Mota MD  Controlled Substance Agreement:  None signed   Diagnosis r/t this medication request: Cervical disc disease.     Unable to complete prescription refill per RN Medication Refill Policy.................... Teresita Conrad RN ....................  2017   1:10 PM

## 2017-12-28 NOTE — PROGRESS NOTES
Patient Information     Patient Name MRN Sex DOB Hultman, Corinne G [id number] Female 1950      Progress Notes by Tania Sevilla RN at 2017 11:35 AM     Author:  Tania Sevilla RN Service:  (none) Author Type:  NURS- Registered Nurse     Filed:  2017 11:35 AM Date of Service:  2017 11:35 AM Status:  Signed     :  Tania Sevilla RN (NURS- Registered Nurse)            Admission Note    Data:  Corinne G Hultman admitted to [facility] from emergency department via cart.      Action:  Family and Dr. Conrad have been notified of admission.      Response:  Patient tolerated transfer. and Patient is stable.

## 2017-12-29 NOTE — ED NOTES
Patient Information     Patient Name MRN Sex DOB Hultman, Corinne G [id number] Female 1950      ED Nursing Note by Griselda Delgado RN at 2017 10:58 AM     Author:  Griselda Delgado RN Service:  (none) Author Type:  NURS- Registered Nurse     Filed:  2017 10:59 AM Date of Service:  2017 10:58 AM Status:  Signed     :  Griselda Delgado RN (NURS- Registered Nurse)            ADMISSION/TRANSPORT NOTE    Corinne G Hultman will be transported to [facility] by cart with .    Transport team included: ED Tech    Report called to Maddie MCCABE    Pain Level:   Acute Pain Intensity (0-10): 6  Functional Pain Scale (0-5): Intolerable (and can't use telephone, watch TV, or read)  Post Intervention Intensity (0-10): 3

## 2017-12-29 NOTE — H&P
Patient Information     Patient Name MRN Sex DOB Hultman, Corinne G [id number] Female 1950      H&P by John Conrad MD at 2017 12:28 PM     Author:  John Conrad MD Service:  (none) Author Type:  Physician     Filed:  2017  1:04 PM Date of Service:  2017 12:28 PM Status:  Signed     :  John Conrad MD (Physician)            ADMISSION HISTORY AND PHYSICAL    Date of Face to Face Service 2017     Corinne G Hultman   [address] [place] [address]  67 y.o. female  Admission Date/Time: 2017  6:37 AM    Primary Care Provider / Referring Physician:  Amaury Guevara MD    Informant:  patient    CHIEF COMPLAINT:  Abdominal pain     HPI:  Patient reports five-day history of colicky abdominal pain. It has been migratory. She is not really sure if it was any worse with food or liquid intake but does report that it seemed to improve with bowel movements. Her last normal bowel movement was last Tuesday. Felt quite a bit better after that. Then symptoms started to worsen by Thursday. Did have a small bowel movement yesterday. Has been passing flatus. Reports her bowel movements have been normal consistency and have not been black or bloody. She has had some nausea but no vomiting. No fevers or chills. No known sick contacts. Over the past 2 days symptoms have been more constant and she has not been able to sleep.     REVIEW OF SYSTEMS:  A comprehensive review of systems was negative except for items noted in HPI/Subjective.    Patient Active Problem List      Diagnosis Date Noted     Gastroenteritis 2017     Generalized abdominal pain 2017     History of basal cell cancer 2017     Hyperlipidemia 2015     VERTIGO 2012     DISC DISEASE, CERVICAL 2012     Diffuse follicle center lymphoma (HC) 2011     OSTEOPENIA      HEADACHE, TENSION      Past Medical History:     Diagnosis  Date     Actinic keratosis  2013     Closed fracture of lateral malleolus 2010     Degeneration of cervical intervertebral disc      Disorder of bone and cartilage, unspecified      Menopause age 50     Open fracture of lateral malleolus      Osteoporosis     on Fosamax for 17 years--stopped       Other malignant lymphomas, unspecified site, extranodal and solid organ sites      Rheumatic fever 10+ years ago     Tension headache      Vertigo      Past Surgical History:      Procedure  Laterality Date     BREAST BIOPSY  mid     benign       CERVICAL FUSION  2012    C6-7 on left with discectomy       COLONOSCOPY SCREENING  age 55    normal       COLONOSCOPY SCREENING  12/10/2015    lnormal--repeat 10 years       LUMBAR LAMINECTOMY  1984    L4-5       CA TX ECTOP PREG BY LAPAROSCOPE Left     left tube and ovary removed       SKIN BIOPSY      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       TONSILLECTOMY       TUBAL LIGATION      FOR ECTOPIC PREGNANCY       Family History       Problem   Relation Age of Onset     Heart Disease  Mother       after cardiac bypass, age 71       Heart Disease  Father      congestive heart failure        Other  Other      headaches, otherwise A&W       Cancer-breast  Other      Social History Narrative     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        Drug use:   No      Sexual activity:   Yes      Partners:  Male        PREADMISSION MEDICATIONS:  Prescriptions Prior to Admission     Medication  Sig     acetaminophen-caffeine-butalbital (FIORICET) 325-40-50 mg tablet TAKE 1 TO 2 TABLETS BY MOUTH EVERY 4 TO 6 HOURS AS NEEDED FOR HEADACHE. MAX  "OF 6 DOSES PER DAY.     acetaminophen-codeine, 300-30 mg, (TYLENOL #3) tablet TAKE 1 TO 2 TABLETS BY MOUTH EVERY 6 HOURS IF NEEDED, NO MORE THAN 4,000MG OF ACETAMINOPHEN DAILY     CALCIUM CARBONATE (CALCIUM 600 ORAL) Take  by mouth 2 times daily.     celecoxib (CELEBREX) 200 mg capsule Take 1 capsule by mouth once daily with a meal.     Cholecalciferol, Vitamin D3, (VITAMIN D-3) 2,000 unit tablet Take 1 tablet by mouth once daily.     Cranberry Extract 500 mg cap Take  by mouth once daily.     ibuprofen (ADVIL; MOTRIN) 200 mg tablet 600-800 mg prn     POLYETHYLENE GLYCOL 3350 (MIRALAX ORAL) Take 1 Cap by mouth once daily if needed.     zolpidem (AMBIEN) 5 mg tablet Take 0.5-1 tablets by mouth at bedtime if needed for Sleep.       ALLERGIES/SENSITIVITIES:   No Known Allergies    PHYSICAL EXAM:    General Appearance:   NAD at present    /85  Pulse 84  Temp 96.9  F (36.1  C)  Resp 16  Ht 1.549 m (5' 1\")  Wt 52.3 kg (115 lb 4.8 oz)  SpO2 100%  Breastfeeding? No  BMI 21.79 kg/m2  Body mass index is 21.79 kg/(m^2).  EYES: EOMI, PERRl  HEAD, EARS, NOSE, MOUTH, AND THROAT: NCAT, TMs nl, OP/NP nl  NECK: THyroid without enlargement or nodularity  RESPIRATORY: Clear to ascultation bilaterally.  No increased respiratory effort.   CARDIOVASCULAR: Regular rate and rhythm.  No murmurs rubs or gallops heard.   GASTROINTESTINAL: Abdomen Soft, no distention. She is diffusely tender most prominently in lower left quadrant and lower midline. She does however have some tenderness in the right upper quadrant and right lower quadrants.  No masses, rebound or guarding. Bowel sounds slightly hypoactive.  MUSCULOSKELETAL: No synovitis.  Muscle strength age and body habitus appropriate as well as equal and even.   LYMPHATIC: No lymphadenopathy  SKIN/HAIR/NAILS: Good skin turgor. No abnormal bruising or bleeding    Laboratory:  Recent Labs       11/25/17   0715   WBC  6.9   HGB  12.2   MCV  96   PLT  202       Recent Labs       " 11/25/17   0715   SODIUM  131 L   POTASSIUM  3.9   CHLORIDE  96 L   [id number]  24   BUN  11   CREATININE  0.69 L   GLUCOSE  110 H   CALCIUM  9.5       Recent Labs       11/25/17   0715   ALBUMIN  5.1   BILITOTAL  0.5   ALT  24   AST  23   PROTEIN  7.4     Exam:CT ABDOMEN PELVIS W     History: Abdominal pain     Comparisons:None.     Technique: CT scan of the abdomen and pelvis with IV contrast. Sagittal coronal reconstructions were obtained     Findings: The lung bases are clear. The liver is free of masses or biliary ductal enlargement. No calcified gallstones are seen. The spleen and pancreas are normal. There are no adrenal masses. Right kidney has an extrarenal pelvis with a small benign cyst. The Left kidneys show no evidence of masses or hydronephrosis. In the pelvis, the bladder and rectum appear normal the uterus is normal in size there is some free fluid in the pelvic cul-de-sac. Intraperitoneally the appendix was not seen. There is gaseous distention of colonic loops. No intraperitoneal masses or inflammatory changes are seen. There are degenerative changes present in the thoracic spine.         Impression: Gaseous distention of the colonic loops. The cecum appears to be on a mesentery and lies a new the midline of the abdomen.  2. The appendix was not visualized  3. There is a benign cyst in the right kidney and a prominent right extrarenal pelvis no calculi are visualized.  4. Some free fluid in the pelvic cul-de-sac    Additional Comments:    Principal Problem:    Gastroenteritis  Active Problems:    Generalized abdominal pain    Patient presents with generalized abdominal pain of 4-5 days duration. It has improved with bowel movements but CT scan does not show obvious constipation. We have seen quite a bit of viral gastroenteritis which this certainly could be but she has not had typical symptoms of vomiting or diarrhea. Her lab work and CT are reassuring. We will treat her with IV fluids to allow for  bowel rest, serial abdominal exams and pain medications. Patient will also be given IV Pepcid. Patient will be placed on observation to monitor for any new or worsening symptoms.    Fluids/lites nutrition - normal saline at 75 mL an hour for now. Patient appears euvolemic.    DVT prophylaxis - Lovenox subcutaneous    Code status - full      John Conrad MD

## 2017-12-29 NOTE — DISCHARGE SUMMARY
Patient Information     Patient Name MRN Sex DOB Hultman, Corinne G [id number] Female 1950      Discharge Summaries by John Conrad MD at 2017  2:01 PM     Author:  John Conrad MD Service:  (none) Author Type:  Physician     Filed:  2017  2:05 PM Date of Service:  2017  2:01 PM Status:  Signed     :  John Conrad MD (Physician)            HOSPITAL DISCHARGE SUMMARY    Patient Name: Corinne G Hultman  YOB: 1950  Age: 67 y.o.  Medical Record Number: [id number]  Primary Physician: Amaury Guevara MD  Phone: [phone]  Admission Date: 2017  Discharge Date: 2017     She will be discharged from [facility] to home.    PRINCIPAL DISCHARGE DIAGNOSIS: Abdominal pain    Principal Problem:    Gastroenteritis  Active Problems:    Generalized abdominal pain    BRIEF HOSPITAL COURSE: This 67 y.o. female presented with 4-5 days history of crampy abdominal pain. Pain seemed to be worse with eating and slightly better with bowel movement. She did have nausea and an episode of vomiting. No diarrhea, in fact was more constipated. Labs and CT scan are reassuring. She is admitted for pain control and bowel rest. After a couple doses of Dilaudid she felt much better. Antiemetics controlled her nausea nicely.  Last dosage of pain medications was about 24 hours ago. Pain resolved in the middle of the night last night. This morning was able to tolerate full liquid breakfast and soft regular diet for lunch with no recurrence of symptoms. Abdominal exam this morning and on discharge was completely benign.  Bowel sounds were normoactive and she was passing flatus. Patient will be discharged with some ranitidine and Zofran although I do not think she'll require either. She'll follow-up with any return in symptoms.    She is advised to maintain a high-fiber diet and plenty of fluids to prevent chronic  "constipation.    PROCEDURES PERFORMED DURING HOSPITALIZATION: None    COMPLICATIONS IN HOSPITAL: None    PERTINENT FINDINGS/RESULTS AT DISCHARGE: /75  Pulse 79  Temp 98.8  F (37.1  C)  Resp 18  Ht 1.549 m (5' 1\")  Wt 53.5 kg (117 lb 15.1 oz)  SpO2 98%  Breastfeeding? No  BMI 22.29 kg/m2   Patient Vitals for the past 72 hrs:   Weight   11/26/17 0213 53.5 kg (117 lb 15.1 oz)   11/25/17 1108 52.3 kg (115 lb 4.8 oz)   11/25/17 0640 52.6 kg (116 lb)   Respiratory: Clear to auscultation bilaterally  Cardiovascular: Regular rate and rhythm  GI: Abdomen Soft, non-tender.  No masses, rebound or guarding. Bowel sounds active    Latest Laboratory Results:  Chem:  Recent Labs        11/26/17 0428  11/25/17   0715   SODIUM  135  131 L   POTASSIUM  3.6  3.9   CREATININE  0.50 L  0.69 L     WBC/Hgb:  Recent Labs        11/26/17 0428  11/25/17   0715   WBC  5.6  6.9   HGB  11.9 L  12.2     INR:  No results for input(s): INR in the last 720 hours.      IMPORTANT PENDING TEST RESULTS:       These Lab Items may not have been resulted by the time the patient was discharged:            None          CONDITION AT DISCHARGE:    Improving    DISCHARGE ORDERS     Your Home Medicines      START taking these medicines       Instructions    ondansetron 4 mg tablet   For diagnoses:  Nausea and vomiting, intractability of vomiting not specified, unspecified vomiting type   Commonly known as:  ZOFRAN    Take 1 tablet by mouth every 8 hours if needed for Nausea/Vomiting.       ranitidine 150 mg tablet   For diagnoses:  Abdominal pain, unspecified abdominal location, Nausea and vomiting, intractability of vomiting not specified, unspecified vomiting type   Commonly known as:  ZANTAC    Take 1 tablet by mouth 2 times daily.         CONTINUE taking these medicines       Instructions    acetaminophen-caffeine-butalbital 325-40-50 mg tablet   For diagnoses:  Tension headache   Commonly known as:  FIORICET    TAKE 1 TO 2 TABLETS BY " MOUTH EVERY 4 TO 6 HOURS AS NEEDED FOR HEADACHE. MAX OF 6 DOSES PER DAY.       acetaminophen-codeine (300-30 mg) tablet   For diagnoses:  Degeneration of cervical intervertebral disc, Tension headache   Commonly known as:  TYLENOL #3    TAKE 1 TO 2 TABLETS BY MOUTH EVERY 6 HOURS IF NEEDED, NO MORE THAN 4,000MG OF ACETAMINOPHEN DAILY       CALCIUM 600 ORAL    Take  by mouth 2 times daily.       celecoxib 200 mg capsule   For diagnoses:  Degeneration of cervical intervertebral disc   Commonly known as:  CeleBREX    Take 1 capsule by mouth once daily with a meal.       Cranberry Extract 500 mg Cap    Take  by mouth once daily.       ibuprofen 200 mg tablet   Commonly known as:  ADVIL; MOTRIN    600-800 mg prn       MIRALAX ORAL    Take 1 Cap by mouth once daily if needed.       Vitamin D-3 2,000 unit tablet   Generic drug:  Cholecalciferol (Vitamin D3)    Take 1 tablet by mouth once daily.       zolpidem 5 mg tablet   For diagnoses:  Insomnia, unspecified type   Commonly known as:  AMBIEN    Take 0.5-1 tablets by mouth at bedtime if needed for Sleep.   Doctor's comments:  Will do PA if needed            Where to get your medicines      These medications were sent to [id number] - [place], [place] - [address] - [phone]  [address] [facility] [address]     Phone:  [phone]      ondansetron 4 mg tablet     ranitidine 150 mg tablet            After Discharge Orders and Instructions     Primary Care Provider follow up appointment(s)       Amaury Guevara MD         When to Follow Up:  within 5 days       Regular Diet       Eat a wide variety of foods, including fruits and vegetable, dairy, grains and meats.  Limit the amount of solid fat such as butter, margarine and shortening.  Get most of your fat sources from fish, nuts and vegetable oils.       Up as Tolerated       Get regular activity and try to walk for a total of 30 minutes per day.  Start by  walking for 5 to 10 minutes at one time and slowly build to walking for 30 minutes one time.  Rest is also an important part of healing.  Slowly return to your regular level of activity. Save your energy by spreading out activities that make you tired.  Rest as needed.         When should you be concerned?       Your health care provider is:  Amaury Guevara MD    Please call your health care provider if:    - you feel you are getting worse or having an increase in problems    - fever greater than 101 degrees  - increasing shortness of breath  - any signs of infection (increasing redness, swelling, tenderness, warmth, change in appearance, or  increased drainage)  - blood in your urine or stool  - coughing or vomiting blood  - nausea (upset stomach) and vomiting and/or diarrhea that will not stop  - severe pain that is not relieved by medicine, rest or ice    Call 911 if you feel you are having a medical emergency.       Why you were at the hospital       The reason(s) you were in the hospital is/are you had significant abdominal pain and symptoms most likely due to viral infection.  We gave you some IV fluid and bowel rest and treated your pain with a couple doses of pain medications and everything went away.  If your symptoms return you need to be seen again.    The ranitidine can help heal your stomach and the lining of your intestines but I doubt you will need it long term.    The zofran would be useful if any nausea or vomiting returned.                 FOLLOW-UP: She should see Amaury Guevara MD in 1w.    Specialty follow-up: None    Total time spent for discharge on date of discharge: >30 minutes  I saw the patient on the date of discharge.

## 2017-12-29 NOTE — ED PROVIDER NOTES
Patient Information     Patient Name MRN Sex DOB Hultman, Corinne G [id number] Female 1950      ED Provider Note by Javier Valencia MD at 2017  7:07 AM     Author:  Javier Valencia MD Service:  (none) Author Type:  Physician     Filed:  2017 10:45 AM Date of Service:  2017  7:07 AM Status:  Signed     :  Javier Valencia MD (Physician)            PATIENT:  Corinne G Hultman       67 y.o.       female       MRN #:  [id number]    CHIEF COMPLAINT:  Abdominal Pain      HPI Comments: This is a 68 yo female who denies significant past medical history presenting with acute worsening diffuse abdominal pain which started 5 days ago associated with bloating sensation and has Worse in the past 2 days. Patient states she did not get any rest last night because of the pain pacing in her room back and forth. She states the pain eases up only to come back with severe intensity. She describes the pain as a gas pain. She states she had small bowel movement yesterday but she hasn't passed gas since yesterday. She denies increased burping, nausea or vomiting or chills or body aches. She denies any bowel suture history of bowel obstruction or ischemia. Patient also denies history of arrhythmia or cardiovascular disease.       ALLERGIES:  Review of patient's allergies indicates no known allergies.    CURRENT MEDICATIONS:      acetaminophen-caffeine-butalbital (FIORICET) 325-40-50 mg tablet   acetaminophen-codeine, 300-30 mg, (TYLENOL #3) tablet   CALCIUM CARBONATE (CALCIUM 600 ORAL)   celecoxib (CELEBREX) 200 mg capsule   Cholecalciferol, Vitamin D3, (VITAMIN D-3) 2,000 unit tablet   Cranberry Extract 500 mg cap   ibuprofen (ADVIL; MOTRIN) 200 mg tablet   POLYETHYLENE GLYCOL 3350 (MIRALAX ORAL)   zolpidem (AMBIEN) 5 mg tablet     PROBLEM LIST:       History of basal cell cancer      Hyperlipidemia      VERTIGO      DISC DISEASE, CERVICAL      Diffuse follicle center lymphoma (HC)       OSTEOPENIA      HEADACHE, TENSION        PAST MEDICAL HISTORY:    Past Medical History:     Diagnosis  Date     Actinic keratosis 2013     Closed fracture of lateral malleolus 2010     Degeneration of cervical intervertebral disc      Disorder of bone and cartilage, unspecified      Menopause age 50     Open fracture of lateral malleolus      Osteoporosis      Other malignant lymphomas, unspecified site, extranodal and solid organ sites      Rheumatic fever 10+ years ago     Tension headache      Vertigo      PAST SURGICAL HISTORY:    Past Surgical History:      Procedure  Laterality Date     Breast biopsy  mid      Cervical fusion  2012     Colonoscopy screening  age 55     Colonoscopy screening  12/10/2015     Lumbar laminectomy  1984     Pr tx ectop preg by laparoscope Left 1974     Skin biopsy       Tonsillectomy       Tubal ligation       FAMILY HISTORY:    Family History       Problem   Relation Age of Onset     Heart Disease  Mother       after cardiac bypass, age 71       Heart Disease  Father      congestive heart failure        Other  Other      headaches, otherwise A&W       Cancer-breast  Other      SOCIAL HISTORY:  Marital Status:   [2]  Employment Status:  Part Time [2]   Occupation:    Substance Use:  Smoking status: Never Smoker                                                              Smokeless status: Never Used                      Alcohol use: Yes           0.6 oz/week     1 Standard drinks or equivalent per week     Comment: once weekly she will have a glass of beer               or wine with dinner.        Review of Systems   Constitutional: Negative for chills, fatigue and fever.   Respiratory: Negative for cough and shortness of breath.    Cardiovascular: Negative for chest pain, palpitations and leg swelling.   Gastrointestinal: Positive for abdominal distention and abdominal pain. Negative for diarrhea, nausea and vomiting.   All other systems  "reviewed and are negative.       Patient Vitals for the past 24 hrs:   BP Temp Pulse Resp SpO2 Height Weight   11/25/17 1030 111/67 97.3  F (36.3  C) - - 97 % - -   11/25/17 1015 - - - - 92 % - -   11/25/17 1000 - - - - 100 % - -   11/25/17 0945 - - - - 100 % - -   11/25/17 0930 115/70 - - - 97 % - -   11/25/17 0915 - - - - (!) 87 % - -   11/25/17 0906 - - - - 97 % - -   11/25/17 0904 - - - - 99 % - -   11/25/17 0902 99/55 - - - 99 % - -   11/25/17 0900 - - - - 97 % - -   11/25/17 0856 - - - - 97 % - -   11/25/17 0852 112/64 - - - 99 % - -   11/25/17 0848 - - - - 98 % - -   11/25/17 0845 - - - - 97 % - -   11/25/17 0844 - - - - 98 % - -   11/25/17 0640 143/94 97.1  F (36.2  C) 84 18 98 % 1.562 m (5' 1.5\") 52.6 kg (116 lb)      Physical Exam   Constitutional: She is oriented to person, place, and time. She appears well-developed and well-nourished. No distress.   Cardiovascular: Normal rate, regular rhythm, normal heart sounds and intact distal pulses.    Pulmonary/Chest: Effort normal and breath sounds normal. No respiratory distress. She has no wheezes. She has no rales. She exhibits no tenderness.   Abdominal: Soft. Bowel sounds are normal. She exhibits no mass. There is no rebound.   Diffuse moderate abdominal tenderness to palpation with voluntary guarding   Musculoskeletal: Normal range of motion. She exhibits no edema or tenderness.   Neurological: She is oriented to person, place, and time.        ECG:      IMAGING:    Ct Abdomen Pelvis W    Result Date: 11/25/2017  Exam:CT ABDOMEN PELVIS W History: Abdominal pain Comparisons:None. Technique: CT scan of the abdomen and pelvis with IV contrast. Sagittal coronal reconstructions were obtained Findings: The lung bases are clear. The liver is free of masses or biliary ductal enlargement. No calcified gallstones are seen. The spleen and pancreas are normal. There are no adrenal masses. Right kidney has an extrarenal pelvis with a small benign cyst. The Left kidneys " show no evidence of masses or hydronephrosis. In the pelvis, the bladder and rectum appear normal the uterus is normal in size there is some free fluid in the pelvic cul-de-sac. Intraperitoneally the appendix was not seen. There is gaseous distention of colonic loops. No intraperitoneal masses or inflammatory changes are seen. There are degenerative changes present in the thoracic spine.    Impression: Gaseous distention of the colonic loops. The cecum appears to be on a mesentery and lies a new the midline of the abdomen. 2. The appendix was not visualized 3. There is a benign cyst in the right kidney and a prominent right extrarenal pelvis no calculi are visualized. 4. Some free fluid in the pelvic cul-de-sac Electronically Signed By: [place] M.D. on 11/25/2017 8:50 AM    LABORATORY:    Results for orders placed or performed during the hospital encounter of 11/25/17       Urinalysis w Reflex Microscopic if Positive       Result  Value Ref Range Status    COLOR                     Yellow Yellow Color Final    CLARITY                   Clear Clear Clarity Final    SPECIFIC GRAVITY,URINE    1.020 1.010, 1.015, 1.020, 1.025                 Final    PH,URINE                  6.0 6.0, 7.0, 8.0, 5.5, 6.5, 7.5, 8.5                 Final    UROBILINOGEN,QUALITATIVE  Normal Normal EU/dl Final    PROTEIN, URINE Negative Negative mg/dL Final    GLUCOSE, URINE Negative Negative mg/dL Final    KETONES,URINE             Negative Negative mg/dL Final    BILIRUBIN,URINE           Negative Negative                 Final    OCCULT BLOOD,URINE        Trace (A) Negative                 Final    NITRITE                   Negative Negative                 Final    LEUKOCYTE ESTERASE        Trace (A) Negative                 Final   URINALYSIS MICROSCOPIC       Result  Value Ref Range Status    RBC 0-2 0-2, None Seen /HPF Final    WBC 0-2 0-2, 3-5, None Seen /HPF Final    BACTERIA                  None Seen None Seen, Rare,  Occasional, Few Bacteria/HPF Final    EPITHELIAL CELLS          Few None Seen, Few Epi/HPF Final   COMP METABOLIC PANEL       Result  Value Ref Range Status    SODIUM 131 (L) 133 - 143 mmol/L Final    POTASSIUM 3.9 3.5 - 5.1 mmol/L Final    CHLORIDE 96 (L) 98 - 107 mmol/L Final    CO2,TOTAL 24 21 - 31 mmol/L Final    ANION GAP 11 5 - 18                 Final    GLUCOSE 110 (H) 70 - 105 mg/dL Final    CALCIUM 9.5 8.6 - 10.3 mg/dL Final    BUN 11 7 - 25 mg/dL Final    CREATININE 0.69 (L) 0.70 - 1.30 mg/dL Final    BUN/CREAT RATIO           16                 Final    GFR if African American >60 >60 ml/min/1.73m2 Final    GFR if not African American >60 >60 ml/min/1.73m2 Final    ALBUMIN 5.1 3.5 - 5.7 g/dL Final    PROTEIN,TOTAL 7.4 6.4 - 8.9 g/dL Final    GLOBULIN                  2.3 2.0 - 3.7 g/dL Final    A/G RATIO 2.2 (H) 1.0 - 2.0                 Final    BILIRUBIN,TOTAL 0.5 0.3 - 1.0 mg/dL Final    ALK PHOSPHATASE 45 34 - 104 IU/L Final    ALT (SGPT) 24 7 - 52 IU/L Final    AST (SGOT) 23 13 - 39 IU/L Final   LIPASE       Result  Value Ref Range Status    LIPASE 25.4 11.0 - 82.0 IU/L Final   CBC WITH AUTO DIFFERENTIAL       Result  Value Ref Range Status    WHITE BLOOD COUNT         6.9 4.5 - 11.0 thou/cu mm Final    RED BLOOD COUNT           3.69 (L) 4.00 - 5.20 mil/cu mm Final    HEMOGLOBIN                12.2 12.0 - 16.0 g/dL Final    HEMATOCRIT                35.5 33.0 - 51.0 % Final    MCV                       96 80 - 100 fL Final    MCH                       33.1 26.0 - 34.0 pg Final    MCHC                      34.4 32.0 - 36.0 g/dL Final    RDW                       12.3 11.5 - 15.5 % Final    PLATELET COUNT            202 140 - 440 thou/cu mm Final    MPV                       9.1 6.5 - 11.0 fL Final    NEUTROPHILS               71.5 42.0 - 72.0 % Final    LYMPHOCYTES               18.2 (L) 20.0 - 44.0 % Final    MONOCYTES                 8.5 <12.0 % Final    EOSINOPHILS               1.4 <8.0 % Final     BASOPHILS                 0.1 <3.0 % Final    IMMATURE GRANULOCYTES(METAS,MYELOS,PROS) 0.3 % Final    ABSOLUTE NEUTROPHILS      5.0 1.7 - 7.0 thou/cu mm Final    ABSOLUTE LYMPHOCYTES      1.3 0.9 - 2.9 thou/cu mm Final    ABSOLUTE MONOCYTES        0.6 <0.9 thou/cu mm Final    ABSOLUTE EOSINOPHILS      0.1 <0.5 thou/cu mm Final    ABSOLUTE BASOPHILS        0.0 <0.3 thou/cu mm Final    ABSOLUTE IMMATURE GRANULOCYTES(METAS,MYELOS,PROS) 0.0 <=0.3 thou/cu mm Final       ED COURSE:  ED Course     this lady presenting with acute worsening of diffuse abdominal pain started 5 days ago but has gotten progressively worse in the past 2 days. She appears uncomfortable and on examination she has some voluntary guarding on abdominal examination. I'm concerned about bowel obstruction/ischemic bowel in this lady. We'll obtain basic labs as well as a CT abdomen/pelvis with IV contrast and start IV fluids. Patient received morphine 4 mg IV which did not seem to control the pain at all. She received Dilaudid 1 mg IV twice for ongoing pain. CT does not reveal signs of bowel obstruction, appendicitis or inflammatory bowel disease/ischemic bowel. I have personally spoken reading radiologist and he confirmed his previous reading of the CT. Because of patient's ongoing persistent abdominal pain she is admitted by Dr. Conrad for further care. Final care discussed with the patient and she is in agreement.    IMPRESSION and PLAN:      ENCOUNTER DIAGNOSES:  ENCOUNTER DIAGNOSES        ICD-10-CM    1. Abdominal pain, unspecified abdominal location R10.9    2. Nausea and vomiting, intractability of vomiting not specified, unspecified vomiting type R11.2        Coding    Javier Valencia MD  DOS: 11/25/2017   [facility]

## 2017-12-30 NOTE — NURSING NOTE
Patient Information     Patient Name MRN Sex DOB Hultman, Corinne G [id number] Female 1950      Nursing Note by Beverly Blankenship at 2017 10:00 AM     Author:  Beverly Blankenship Service:  (none) Author Type:  (none)     Filed:  2017 10:45 AM Encounter Date:  2017 Status:  Signed     :  Beverly Blankenship            Patient presents to clinic for hospital follow up  Beverly Curiel ....................  2017   10:07 AM

## 2018-01-03 NOTE — PROGRESS NOTES
Patient Information     Patient Name MRN Sex DOB Hultman, Corinne G [id number] Female 1950      Progress Notes by Rahul Braun MD at 2017  4:15 PM     Author:  Rahul Braun MD Service:  (none) Author Type:  Physician     Filed:  2017  4:33 PM Encounter Date:  2017 Status:  Signed     :  Rahul Braun MD (Physician)            Nursing Notes:   Jackie Hilliard  2017  3:59 PM  Signed  Patient presents to the clinic with numbness in her left hip all the way down her leg.  She's also got numbness in her mid back.  She went to [place] for her grandson's graduation from the  last week had to stand for hours on cement, in 2 days she stood about 14 hours, and started having trouble right after that.  Jackie Hilliard LPN....................2017 3:55 PM    Corinne G Hultman is a 66 y.o. female who presents for   Chief Complaint     Patient presents with       Leg Pain/problem      Leg weakness     HPI: Ms. Avila has new numbness and tingling in left lower extremity hip to entire foot and left posterior shoulder and chest wall as well as R lower leg into her toes with a history of L5 surgery in remote past and cervical fusion in . All these symptoms of numbness are acutely new this past wekend ; she has lost sensation in her perineum also; no incontinance. Strength seems normal. Evi turner is very active walking miles during the day[summer} but 2 1/2 miles even last week in winter weather; she  Stood for hours at a [employer] ceremony in [place] on the  and then this started.   Past Medical History      Diagnosis   Date     Actinic keratosis  2013     Closed fracture of lateral malleolus  2010     Degeneration of cervical intervertebral disc       Disorder of bone and cartilage, unspecified       Menopause  age 50     Open fracture of lateral malleolus       Osteoporosis       on Fosamax for 17 years--stopped       Other malignant lymphomas,  unspecified site, extranodal and solid organ sites       Rheumatic fever  10+ years ago     Tension headache       Vertigo       Past Surgical History       Procedure   Laterality Date     Tonsillectomy        Lumbar laminectomy   1984     L4-5       Tubal ligation        FOR ECTOPIC PREGNANCY       Skin biopsy        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       Breast biopsy   mid      benign       Colonoscopy screening   age 55     normal       Cervical fusion   2012     C6-7 on left with discectomy       Pr tx ectop preg by laparoscope  Left      left tube and ovary removed       Colonoscopy screening   12/10/2015     lnormal--repeat 10 years       Family History       Problem   Relation Age of Onset     Heart Disease  Mother       after cardiac bypass, age 71       Heart Disease  Father      congestive heart failure        Other  Other      headaches, otherwise A&W       Cancer-breast  Other      Current Outpatient Prescriptions       Medication  Sig Dispense Refill     acetaminophen-caffeine-butalbital (FIORICET) 325-40-50 mg tablet TAKE 1 TO 2 TABLETS BY MOUTH EVERY 4 TO 6 HOURS AS NEEDED FOR HEADACHE. MAX OF 6 DOSES PER DAY. 90 tablet 1     CALCIUM CARBONATE (CALCIUM 600 ORAL) Take  by mouth 2 times daily.       carisoprodol (SOMA) 350 mg tablet Take 1 tablet by mouth 4 times daily if needed for Muscle Spasm. 120 tablet 5     celecoxib (CELEBREX) 200 mg capsule Take 1 capsule by mouth once daily with a meal. 90 capsule 3     Cholecalciferol, Vitamin D3, (VITAMIN D-3) 2,000 unit tablet Take 1 tablet by mouth once daily.  0     codeine-acet-caffeine-butalbital (FIORICET WITH CODEINE) -98-30 mg cap Take 1 capsule by mouth every 4 hours if needed for Headache. Max acetaminophen dose: 4000 mg per day 40 capsule 5     codeine-guaiFENesin (CHERATUSSIN AC)  mg/5 mL liquid Take 5-10 mL by mouth every 4 hours if needed for Cough. Max dose 60 mL per 24 hrs. 120 mL 1      "Cranberry Extract 500 mg cap Take  by mouth once daily.       ibuprofen (ADVIL; MOTRIN) 200 mg tablet 600-800 mg prn  0     No current facility-administered medications for this visit.      Medications have been reviewed by me and are current to the best of my knowledge and ability.    No Known Allergies    EXAM:   Vitals:     01/16/17 1555   BP: 126/88   Temp: 98  F (36.7  C)   Weight: 51.9 kg (114 lb 6.4 oz)   Height: 1.56 m (5' 1.42\")     General Appearance: Pleasant, alert, appropriate appearance for age. No acute distress  Chest/Respiratory Exam: Normal chest wall and respirations. Clear to auscultation.  Cardiovascular Exam: Regular rate and rhythm. S1, S2, no murmur, click, gallop, or rubs.  Neurologic Exam: Nonfocal; symmetric DTRs, normal gross motor movement, tone, and coordination. No tremor. Neg rhomberg, neg drift; good finger to noseASSESSMENT AND PLAN:  1. Numbness in left leg  Cervical disc disease   -somewhat emergent will do Cervical MR to look at this ASAP                 "

## 2018-01-03 NOTE — PROGRESS NOTES
Patient Information     Patient Name MRN Sex DOB Hultman, Corinne G [id number] Female 1950      Progress Notes by Amaury Guevara MD at 2017  9:30 AM     Author:  Amaury Guevara MD Service:  (none) Author Type:  Physician     Filed:  2017  5:21 PM Encounter Date:  2017 Status:  Signed     :  Amaury Guevara MD (Physician)            SUBJECTIVE:  67 y.o. female who presents for evaluation of several issues. First of all she continues to have problems with her left leg. It feels numb and it feels like it's just a stump or a fence post. She says sometimes she feels like she doesn't have control of the leg. She gets radicular pain as well and not real severe. Symptoms go all the way down to the toes. It hasn't gotten any better with any treatments including prednisone.    Secondly she has been adjusting medications at a stop taking soma and Fioricet with codeine. She would like to just substitute Tylenol 3 which she might use once or twice a day.    She saw her oncologist recently and got a good report. She does not need to go back for a year.    Additional Review of Systems: see HPI:   No bowel or bladder dysfunction    Past Medical History      Diagnosis   Date     Actinic keratosis  2013     Closed fracture of lateral malleolus  2010     Degeneration of cervical intervertebral disc       Disorder of bone and cartilage, unspecified       Menopause  age 50     Open fracture of lateral malleolus       Osteoporosis       on Fosamax for 17 years--stopped       Other malignant lymphomas, unspecified site, extranodal and solid organ sites  2011     Rheumatic fever  10+ years ago     Tension headache       Vertigo          Current Outpatient Prescriptions       Medication  Sig Dispense Refill     acetaminophen-caffeine-butalbital (FIORICET) 325-40-50 mg tablet TAKE 1 TO 2 TABLETS BY MOUTH EVERY 4 TO 6 HOURS AS NEEDED FOR HEADACHE. MAX OF 6 DOSES PER DAY. 90 tablet 1  "    acetaminophen-codeine, 300-30 mg, (TYLENOL #3) tablet Take 1-2 tablets by mouth every 6 hours if needed. Max acetaminophen dose: 4000mg in 24 hrs. 100 tablet 3     CALCIUM CARBONATE (CALCIUM 600 ORAL) Take  by mouth 2 times daily.       celecoxib (CELEBREX) 200 mg capsule Take 1 capsule by mouth once daily with a meal. 90 capsule 3     Cholecalciferol, Vitamin D3, (VITAMIN D-3) 2,000 unit tablet Take 1 tablet by mouth once daily.  0     Cranberry Extract 500 mg cap Take  by mouth once daily.       ibuprofen (ADVIL; MOTRIN) 200 mg tablet 600-800 mg prn  0     No current facility-administered medications for this visit.      Medications have been reviewed by me and are current to the best of my knowledge and ability.      Allergies as of 02/28/2017      (No Known Allergies)        OBJECTIVE:  Visit Vitals       /78     Ht 1.56 m (5' 1.42\")     Wt 51.5 kg (113 lb 9.6 oz)     BMI 21.17 kg/m2     EXAM:  General Appearance: Pleasant, alert, appropriate appearance for age. No acute distress  Musculoskeletal Exam: Neck is quite stiff as always. Examination of the left leg reveals normal reflexes and negative straight leg raising..  MRI report was reviewed. She appears to have a lumbar radiculopathy that arises from the L5-S1 level    ASSESSMENT/PLAN:  Persistent leg discomfort with L5-S1 radiculopathy on the left. She was referred to see her same surgeon who did her neck surgery. We did discuss the possibility of injection however we will get the consultation first.    Medication changes are made including discontinuing Fioricet with codeine and soma. Refilled Tylenol 3.  Amaury Guevara MD ....................  2/28/2017   5:21 PM            "

## 2018-01-03 NOTE — NURSING NOTE
Patient Information     Patient Name MRN Sex DOB Hultman, Corinne G [id number] Female 1950      Nursing Note by Jackie Hilliard at 2017  4:15 PM     Author:  Jackie Hilliard Service:  (none) Author Type:  (none)     Filed:  2017  3:59 PM Encounter Date:  2017 Status:  Signed     :  Jackie Hilliard            Patient presents to the clinic with numbness in her left hip all the way down her leg.  She's also got numbness in her mid back.  She went to [place] for her grandson's graduation from the  last week had to stand for hours on cement, in 2 days she stood about 14 hours, and started having trouble right after that.  Jackie Hilliard LPN....................2017 3:55 PM

## 2018-01-03 NOTE — PROGRESS NOTES
Patient Information     Patient Name MRN Sex DOB Hultman, Corinne G [id number] Female 1950      Progress Notes by Aury Huntley at 3/17/2017 11:07 AM     Author:  Aury Huntley Service:  (none) Author Type:  Other Clinical Staff     Filed:  3/17/2017 11:07 AM Date of Service:  3/17/2017 11:07 AM Status:  Signed     :  Aury Huntley (Other Clinical Staff)            Falls Risk Criteria:    Age 65 and older or under age 4        Sensory deficits    Poor vision    Use of ambulatory aides    Impaired judgment    Unable to walk independently    Meets High Risk criteria for falls:  no

## 2018-01-03 NOTE — PROGRESS NOTES
Patient Information     Patient Name MRN Sex DOB Hultman, Corinne G [id number] Female 1950      Progress Notes by Constanza Ferrer at 2017  9:25 AM     Author:  Constanza Ferrer Service:  (none) Author Type:  Other Clinical Staff     Filed:  2017  9:25 AM Date of Service:  2017  9:25 AM Status:  Signed     :  Constanza Ferrer (Other Clinical Staff)            Falls Risk Criteria:    Age 65 and older or under age 4        Sensory deficits    Poor vision    Use of ambulatory aides    Impaired judgment    Unable to walk independently    Meets High Risk criteria for falls:  Yes             1.  Do you have dizziness or vertigo?    no                    2.  Do you need help standing or walking?   no                 3.  Have you fallen within the last 6 months?    no           4.  Has the patient been fasting?      no       If any risks are marked Yes, the following interventions are utilized:    Do not leave patient unattended     Assist patient in the dressing room and bathroom    Have ambulatory aides available throughout procedure    Involve patient s family if available

## 2018-01-03 NOTE — PROGRESS NOTES
Patient Information     Patient Name MRN Sex DOB Hultman, Corinne G [id number] Female 1950      Progress Notes by Aury Huntley at 2017  7:30 AM     Author:  Aury Huntley Service:  (none) Author Type:  Other Clinical Staff     Filed:  2017  7:30 AM Date of Service:  2017  7:30 AM Status:  Signed     :  Aury Huntley (Other Clinical Staff)            Falls Risk Criteria:    Age 65 and older or under age 4        Sensory deficits    Poor vision    Use of ambulatory aides    Impaired judgment    Unable to walk independently    Meets High Risk criteria for falls:  Yes               1.  Do you have dizziness or vertigo?    no                    2.  Do you need help standing or walking?   no                 3.  Have you fallen within the last 6 months?    no           4.  Has the patient been fasting?      no       If any risks are marked Yes, the following interventions are utilized:    Do not leave patient unattended     Assist patient in the dressing room and bathroom    Have ambulatory aides available throughout procedure    Involve patient s family if available

## 2018-01-03 NOTE — ADDENDUM NOTE
Patient Information     Patient Name MRN Sex DOB Hultman, Corinne G [id number] Female 1950      Addendum Note by Rahul Braun MD at 2017 10:49 AM     Author:  Rahul Braun MD Service:  (none) Author Type:  Physician     Filed:  2017 10:49 AM Encounter Date:  2017 Status:  Signed     :  Rahul Braun MD (Physician)       Addended by: RAHUL BRAUN on: 2017 10:49 AM        Modules accepted: Orders

## 2018-01-03 NOTE — NURSING NOTE
Patient Information     Patient Name MRN Sex DOB Hultman, Corinne G [id number] Female 1950      Nursing Note by Jackie Hilliard at 2017 10:00 AM     Author:  Jackie Hilliard Service:  (none) Author Type:  (none)     Filed:  2017 10:05 AM Encounter Date:  2017 Status:  Signed     :  Jackie Hilliard            Patient presents to the clinic with continued pain and numbness in her back and legs.  Jackie Hilliard LPN....................2017 10:00 AM

## 2018-01-03 NOTE — PROGRESS NOTES
Patient Information     Patient Name MRN Sex DOB Hultman, Corinne G [id number] Female 1950      Progress Notes by Rahul Braun MD at 2017 10:00 AM     Author:  Rahul Braun MD Service:  (none) Author Type:  Physician     Filed:  2017 10:45 AM Encounter Date:  2017 Status:  Signed     :  Rahul Braun MD (Physician)            Nursing Notes:   Jackie Hilliard  2017 10:05 AM  Signed  Patient presents to the clinic with continued pain and numbness in her back and legs.  Jackie Hilliard LPN....................2017 10:00 AM    Corinne G Hultman is a 66 y.o. female who presents for   Chief Complaint     Patient presents with       Back Pain      Follow up pain and numbness     HPI: Ms. Avila has noted mild improvement in the constant tingling sensation in her left leg including foot, perineum, R leg including foot and her left upper posterior chest; she has similar symptoms in her left arm and axilla but those are chronic intermittent symptoms. Sh eis also getting some spasms in her lower back that are new. MR of cervical spine where she has had fusion was not remarkable for any spinal compression. She has also had surgery- L5 laminectomy- of her lumbar spine in . DTRs are good as are heel and toe walking. Sh ehas noted no rash  Past Medical History      Diagnosis   Date     Actinic keratosis  2013     Closed fracture of lateral malleolus  2010     Degeneration of cervical intervertebral disc       Disorder of bone and cartilage, unspecified       Menopause  age 50     Open fracture of lateral malleolus       Osteoporosis       on Fosamax for 17 years--stopped       Other malignant lymphomas, unspecified site, extranodal and solid organ sites       Rheumatic fever  10+ years ago     Tension headache       Vertigo       Past Surgical History       Procedure   Laterality Date     Tonsillectomy        Lumbar laminectomy        L4-5        Tubal ligation        FOR ECTOPIC PREGNANCY       Skin biopsy        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       Breast biopsy   mid      benign       Colonoscopy screening   age 55     normal       Cervical fusion   2012     C6-7 on left with discectomy       Pr tx ectop preg by laparoscope  Left      left tube and ovary removed       Colonoscopy screening   12/10/2015     lnormal--repeat 10 years       Family History       Problem   Relation Age of Onset     Heart Disease  Mother       after cardiac bypass, age 71       Heart Disease  Father      congestive heart failure        Other  Other      headaches, otherwise A&W       Cancer-breast  Other      Current Outpatient Prescriptions       Medication  Sig Dispense Refill     acetaminophen-caffeine-butalbital (FIORICET) 325-40-50 mg tablet TAKE 1 TO 2 TABLETS BY MOUTH EVERY 4 TO 6 HOURS AS NEEDED FOR HEADACHE. MAX OF 6 DOSES PER DAY. 90 tablet 1     CALCIUM CARBONATE (CALCIUM 600 ORAL) Take  by mouth 2 times daily.       carisoprodol (SOMA) 350 mg tablet Take 1 tablet by mouth 4 times daily if needed for Muscle Spasm. 120 tablet 5     celecoxib (CELEBREX) 200 mg capsule Take 1 capsule by mouth once daily with a meal. 90 capsule 3     Cholecalciferol, Vitamin D3, (VITAMIN D-3) 2,000 unit tablet Take 1 tablet by mouth once daily.  0     codeine-acet-caffeine-butalbital (FIORICET WITH CODEINE) -18-30 mg cap Take 1 capsule by mouth every 4 hours if needed for Headache. Max acetaminophen dose: 4000 mg per day 40 capsule 5     Cranberry Extract 500 mg cap Take  by mouth once daily.       ibuprofen (ADVIL; MOTRIN) 200 mg tablet 600-800 mg prn  0     No current facility-administered medications for this visit.      Medications have been reviewed by me and are current to the best of my knowledge and ability.    No Known Allergies      EXAM:   Vitals:     17 1000   BP: 130/80   Weight: 52.8 kg (116 lb 6.4 oz)   Height: 1.56 m (5'  "1.42\")     General Appearance: Pleasant, alert, appropriate appearance for age. No acute distress  Neuro- normal DTRs in lower extremities with good heel and toe walking  ASSESSMENT AND PLAN:  1. Numbness and tingling sensation of skin  Will do MR of lumbar spine- I am concerned about the generalized tingling and especially the perineum although Neurology exam is excellent-if MR OK then pednisone will likely clear symptoms and no other treatmetn may be needed                 "

## 2018-01-04 ENCOUNTER — HISTORY (OUTPATIENT)
Dept: ORTHOPEDICS | Facility: OTHER | Age: 68
End: 2018-01-04

## 2018-01-04 ENCOUNTER — OFFICE VISIT - GICH (OUTPATIENT)
Dept: ORTHOPEDICS | Facility: OTHER | Age: 68
End: 2018-01-04

## 2018-01-04 DIAGNOSIS — S42.295D OTHER NONDISPLACED FRACTURE OF UPPER END OF LEFT HUMERUS, SUBSEQUENT ENCOUNTER FOR FRACTURE WITH ROUTINE HEALING: ICD-10-CM

## 2018-01-04 NOTE — NURSING NOTE
Patient Information     Patient Name MRN Sex DOB Hultman, Corinne G [id number] Female 1950      Nursing Note by Mai Barboza at 3/21/2017 12:45 PM     Author:  Mai Barboza Service:  (none) Author Type:  (none)     Filed:  3/21/2017  2:00 PM Encounter Date:  3/21/2017 Status:  Signed     :  Mai Barboza            Patient presents to the clinic for skin lesion on right leg.  Mai Barboza..DOMINIC, (AAMA)3/21/2017..1:04 PM

## 2018-01-04 NOTE — PROGRESS NOTES
Patient Information     Patient Name MRN Sex DOB Hultman, Corinne G [id number] Female 1950      Progress Notes by Yajaira Vance PT at 2017  9:27 AM     Author:  Yajaira Vance PT Service:  (none) Author Type:  PT- Physical Therapist     Filed:  2017 10:22 AM Date of Service:  2017  9:27 AM Status:  Signed     :  Yajaira Vance PT (PT- Physical Therapist)            [facility]  Outpatient PT - Daily Note         Date of Service: 2017   Visit #3  Patient Name: Corinne G Hultman   YOB: 1950   Referring MD/Provider: Reta Bridges PA  Medical and Treatment Diagnosis: Other intervertebral disc disorders, thoracic region [M51.84]  Treatment Diagnosis:  Thoracic and lumbar back pain with radicular sx L leg  Insurance: Medicare: G Codes/C Modifiers at Initial Assessment:     Start of Service: 2017   Certification Dates: Start of Service: 2017   Medicare/MA Re-Cert Due:     Subjective      Current status:  Patient felt the modified marching vs SLR was better.  She thought the US helped the rest of the day and pain in R side is still gone.  Leaving Monday night in RV for [place].  She doesn't drive it though and if she want to she can go lay in the back and stretch out.      Pain/uncomfortable - tight feelin/10    History of Injury/reason for PT: Patient comes in with numbness in L hip to toes since , she had trip out to [place] for miliary graduation, 14 hours standing for 2 days in a row on concrete and has had pain and numbness since.  She likes to walk 4 miles (most days, but not on days that she works 10 hours), but can't do much over a mile at this time, usually walks with neighbor and has noticed gait is slower now too.  Dr. Elizabeth MD did her cervical surgery, he was out of town x3 weeks so she saw SHILO Schwartz at [facility], and she feels the disc herniation at T10-11 spine is what is causing the problem and doesn't  do as well with surgery.  Didn't think that smaller herniations in lumbar spine are problem.  Patient sometimes gets charleyhorses in L leg that are very bad.  Feels like L leg is completely numb, concerned about getting rock in shoe and not being able to feel it and causing problems, also walks on gravel or getting in/out of tub is harder, very careful.  Pain is only 2-3/10, but more uncomfortable solid 8/10 like tunicate on whole leg.  Hard to tell temperature.  Also has h/o chemo on lymph nodes on bottom of mandible.  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  Able to push through all activity.  Glad it's L leg so not affecting driving.  Hasn't tried heat or cold, not sure it would be good- she can't feel anything.  Taking Tylenol 3 at night when it's more achy in back and tight feel in leg worse by the end of the day.     Symptoms at evaluation:  ?   Decreased Motion always feels stiff, not sure if it is actually limited though  Weakness L leg feels weaker  Instability unsteady to lean on L side  Tingling/Numbness - to tips of toes all the way from perineal area   Bowel/bladder changes had incontinence with dancing   Pain Rating:    Pain is 2-3/10, uncomfortable solid 8/10 like tunicate on whole leg.   Pain Location:  L leg, some in back  Aggravation Factors: cleaning, vacuum, scrubbed floors- hurting into low back after cleaning last weekend 3 hours, walking - feels like pelvis gets weak like everything going to fall out, doesn't like to get too far away from home with walking  Easing Factors: rest  Occupation: nurse- 2 days per week    Regular duties include: patient handling   Previous Injury / Surgery: h/o lumbar laminectomy, cervical fusion      Patient Specific Functional and Pain Scales (PSFS) completed 4/4/2017  Clinician Instructions: Complete after the history and before the exam.   Initial Assessment:    We want to know what 3 activities in your life you are unable to perform, or are having the most difficulty performing, as a result of your chief problem. Please list and score at least 3 activities that you are unable to perform, or having the most difficulty performing, because of your chief problem.   Patient Specific Activity Scoring Scheme (score one number for each activity):   Activity Score (0-10)  0= Unable to perform activity  10= Able to perform activity at same level as before injury or problem   1. Cleaning  7.5/10   2. Walking (usually 4 miles, doing 1.5 mile) 7.5/10   3. Aching at end of day 2.5/10   4.  /10   5.  /10   Totals:  17.5/30 = 58% Function   and 42% Impairment   Patient verbally states that they understand that the information they have provided above is current and complete to the best of their knowledge.     Initial Primary G Code and Modifier:    Per the Patient's intake and/or assessment the Primary G Code is: Mobility .   The Patient's Impairment, Limitation or Restriction Modifier would be best described as: CK - 40% - 60% Impairment.   Goal Primary G Code and Modifier:    The Patient's G Code Goal would be: Mobility    The Patient's Impairment, Limitation or Restriction Modifier goal would be best described as: CI - 1% - 20% Impairment.         Objective    Today's Intervention:      NuStep: 7:00 resistance 7, arms 8, seat 6  Marching in hallway with opposite arm swings x100 ft, SBA  Walking tandem x75 ft with CGA, no touches on the wall for support but stepped out of position 2-3 times to maintain balance   Walking backwards x75 ft SBA  Side steps B x75 ft (L leg still feels heavier)  Row with blue band x10- hard to not lean, green x15, better technique, sent both home with her  Standing with arms against wall: thoracic/lumbar rotation x10 B    Ultrasound: continuous, 2.0 w/cm2 in sidelying with pillow between knees, 10' to L thoracolumbar paraspinals, 15' total with  set up    Deferred today:   Standing trunk ext  Scap sets  Supine:  - pelvic tilt x20  - tilt + marching (modified from SLR today when more sore)  - nerve glides L 2x30, R x30  Walking with head turns x75 ft with CGA, she had to step to correct gait a few times - very challenging  SLS EO - mod sway x30 sec, EC- 3-5 sec  Sharpened Romberg R in front 30 sec, L in front 20 sec EC        Home Exercise Program:  Access Code: [email or web address] URL: [email or web address] Date: 04/04/2017 Prepared by: Yajaira Vance   Exercises   Sidelying Thoracic Lumbar Rotation - 10 reps - 5 hold - 2x daily Bx10  Supine Posterior Pelvic Tilt - 20 reps - 5 hold - 2x daily   Supine Pelvic Tilt with Straight Leg Raise - 10 reps - 2x daily   Supine Sciatic Nerve Glide - 30 reps - 2x daily   Standing Lumbar Extension - 5-10 reps - 2 hold - 2x daily   Seated Scapular Retraction - 20 reps - 5 hold - 2x daily     Added to HEP: (just gave a note)  - tandem/sharpened Romberg EC  - SLS EO  - Marching with arms  - modified SLR to tilt + marching as needed    Assessment    Response to Intervention:  Patient was able to demonstrate HEP properly today.  Patient feels comfortable with HEP.      Therapist Assessment / Clinical Impression: Patient presents with signs and symptoms congruent with nerve involvement thoracic/lumbar disc herniations and will benefit from skilled PT to increase ROM, strength, decrease pain, and improve function.      Functional Impairment(s):  See subjective on initial evaluation and Functional Assessment / Summary Report from PSFS.    Physical Impairment(s):       MUSCULOSKELETAL:  Loss of Motion, Pain and Weakness      Patient Goal (time reference required): Patient would like to be able to walk 4 miles with tight/discomfort level of 3/10 or less in 8 weeks.     Functional therapy goals:   Patient is to be independent in their Home Exercise Program in 2 weeks.  Patient is to tolerate walking with normal gait up to 2  miles with 5/10 tightness/discomfort level in 4 weeks.  Patient is to have improved spine mobility to allow for improved household activities including sweeping/vacuuming with 5/10 tightness/discomfort level in 4 weeks.  Patient is to tolerate walking with normal gait up to 4 miles with 3/10 tightness/discomfort level in 8 weeks.  Patient is to have improved spine mobility to allow for improved yard work including gardening with 3/10 tightness/discomfort level in 8 weeks.  Patient is to self-manage symptoms and return to prior function in 8 weeks.      Patient participated in goal selection and understand(s) the plan of care: Yes   Patient Potential for Achieving Desired Outcome:  Good      Plan    Treatment Plan / Targeted Outcomes:     Frequency:   16 visits     Duration of Treatment: 8 weeks    Planned Interventions:  Possible physical therapy interventions include but are not limited to:   Home Exercise Program development  Therapeutic Exercise (ROM & Strengthening)  Manual Therapy  Neuromuscular Re-education  Ultrasound  Electrical Stimulation  Phonophoresis with Ketoprofen  Iontophoresis with Dexamethasone  Therapeutic Activities  Gait Training  Posture and Body Mechanics Education  Mechanical traction if indicated    Plan for next visit:  Advance core strength, ROM, balance, LE strength, add modalities as needed to manage pain and increase function.     Student or PTA has been instructed in and demonstrates skills necessary to carry out above stated treatment plan: Yes    Thank you for your referral to [facility].  Please call with any questions, concerns or comments.  [phone]  Yajaira Vance DPT

## 2018-01-04 NOTE — PROGRESS NOTES
Patient Information     Patient Name MRN Sex DOB Hultman, Corinne G [id number] Female 1950      Progress Notes by Ashia Dill MD at 3/28/2017  8:40 AM     Author:  Ashia Dill MD Service:  (none) Author Type:  Physician     Filed:  3/28/2017  9:07 AM Encounter Date:  3/28/2017 Status:  Signed     :  Ashia Dill MD (Physician)            Patient presents for post procedure visit after excision of skin lesions from her chest wall and right thigh on 3/21. Patient has done well. No problems with incisions.    Visit Vitals       /80     Temp 97.3  F (36.3  C) (Tympanic)     Wt 53.1 kg (117 lb)     BMI 21.23 kg/m2       General: NAD, pleasant and cooperative with exam and interview.  Skin: healing incision right chest wall and right thigh. No sign of infection. No pain with palpation. Sutures removed without difficulty.  Psychiatry: awake, alert and oriented. Appropriate affect.  Pathology results:  Basal cell cancer-chest with + margin, atypical squamous cell lesion right thigh with + margin  Assessment/Plan:  Discussed procedure and pathology results. Patient can return to normal activities. Continue to monitor for new or changing lesions. Follow up in about 6 weeks for recheck and possible excision if residual lesion noted. Encouraged sunscreen use, SPF 30 or greater. Patient will call with questions or concerns.

## 2018-01-04 NOTE — NURSING NOTE
Patient Information     Patient Name MRN Sex DOB Hultman, Corinne G [id number] Female 1950      Nursing Note by Brigitte Canales at 3/28/2017  8:40 AM     Author:  Brigitte Canales Service:  (none) Author Type:  (none)     Filed:  3/28/2017  8:49 AM Encounter Date:  3/28/2017 Status:  Signed     :  Brigitte Canales            Patient comes in for suture removal  On leg and chest.

## 2018-01-04 NOTE — PATIENT INSTRUCTIONS
Patient Information     Patient Name MRN Sex DOB Hultman, Corinne G [id number] Female 1950      Patient Instructions by Ashia Dill MD at 3/21/2017 12:45 PM     Author:  Ashia Dill MD Service:  (none) Author Type:  Physician     Filed:  3/21/2017  1:53 PM Encounter Date:  3/21/2017 Status:  Signed     :  Ashia Dill MD (Physician)            Your incision was closed with stitches that will need to be removed. It is ok to remove the dressing and get the incision wet in the shower on the day after your procedure.  Don't soak in a tub, pool or lake for 5 days. If you have concerns, please call.

## 2018-01-04 NOTE — PROGRESS NOTES
Patient Information     Patient Name MRN Sex DOB Hultman, Corinne G [id number] Female 1950      Progress Notes by Yajaira Vance PT at 2017 10:51 AM     Author:  Yajaira Vance PT Service:  (none) Author Type:  PT- Physical Therapist     Filed:  2017 11:45 AM Date of Service:  2017 10:51 AM Status:  Signed     :  Yajaira Vance PT (PT- Physical Therapist)            [facility]  Outpatient PT - Daily Note         Date of Service: 2017   Visit #4  Patient Name: Corinne G Hultman   YOB: 1950   Referring MD/Provider: Reta Bridges PA  Medical and Treatment Diagnosis: Other intervertebral disc disorders, thoracic region [M51.84]  Treatment Diagnosis:  Thoracic and lumbar back pain with radicular sx L leg  Insurance: Medicare: G Codes/C Modifiers at Initial Assessment:     Start of Service: 2017   Certification Dates: Start of Service: 2017   Medicare/MA Re-Cert Due: 17    Subjective      Current status:  Patient feels the numbness isn't any better since last visit on 17, traveled from [place] and 3 days driving back it got very uncomfortable by the end of the day and it would even spasm.  She is calling the neurosurgeon today because still bothering.  When on vacation she walked every day 3 miles in the mornings and able to get in HEP.  She wants to be able to ride on boat or 4 gupta and doesn't feel safe to do those.  Watched basketball, visited friends, just enjoyed the weather.      Pain/uncomfortable - tight feelin/10    Vertigo symptoms have continued and we discussed further today, when she lays flat or onto side (more on R) getting up she feels the room spins.  Turning head with walking really bothered her before, rolling in bed quickly bothers so now does it slowly.        History of Injury/reason for PT: Patient comes in with numbness in L hip to toes since , she had trip out to [place] for miliary  graduation, 14 hours standing for 2 days in a row on concrete and has had pain and numbness since.  She likes to walk 4 miles (most days, but not on days that she works 10 hours), but can't do much over a mile at this time, usually walks with neighbor and has noticed gait is slower now too.  Dr. Elizabeth MD did her cervical surgery, he was out of town x3 weeks so she saw SHILO Schwartz at [facility], and she feels the disc herniation at T10-11 spine is what is causing the problem and doesn't do as well with surgery.  Didn't think that smaller herniations in lumbar spine are problem.  Patient sometimes gets charleyhorses in L leg that are very bad.  Feels like L leg is completely numb, concerned about getting rock in shoe and not being able to feel it and causing problems, also walks on gravel or getting in/out of tub is harder, very careful.  Pain is only 2-3/10, but more uncomfortable solid 8/10 like tunicate on whole leg.  Hard to tell temperature.  Also has h/o chemo on lymph nodes on bottom of mandible.  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  Able to push through all activity.  Glad it's L leg so not affecting driving.  Hasn't tried heat or cold, not sure it would be good- she can't feel anything.  Taking Tylenol 3 at night when it's more achy in back and tight feel in leg worse by the end of the day.     Symptoms at evaluation:  ?   Decreased Motion always feels stiff, not sure if it is actually limited though  Weakness L leg feels weaker  Instability unsteady to lean on L side  Tingling/Numbness - to tips of toes all the way from perineal area   Bowel/bladder changes had incontinence with dancing   Pain Rating:    Pain is 2-3/10, uncomfortable solid 8/10 like tunicate on whole leg.   Pain Location:  L leg, some in back  Aggravation Factors: cleaning, vacuum, scrubbed floors- hurting into low back after cleaning  last weekend 3 hours, walking - feels like pelvis gets weak like everything going to fall out, doesn't like to get too far away from home with walking  Easing Factors: rest  Occupation: nurse- 2 days per week    Regular duties include: patient handling   Previous Injury / Surgery: h/o lumbar laminectomy, cervical fusion      Patient Specific Functional and Pain Scales (PSFS) completed 4/4/2017  Clinician Instructions: Complete after the history and before the exam.   Initial Assessment:   We want to know what 3 activities in your life you are unable to perform, or are having the most difficulty performing, as a result of your chief problem. Please list and score at least 3 activities that you are unable to perform, or having the most difficulty performing, because of your chief problem.   Patient Specific Activity Scoring Scheme (score one number for each activity):   Activity Score (0-10)  0= Unable to perform activity  10= Able to perform activity at same level as before injury or problem   1. Cleaning  7.5/10   2. Walking (usually 4 miles, doing 1.5 mile) 7.5/10   3. Aching at end of day 2.5/10   4.  /10   5.  /10   Totals:  17.5/30 = 58% Function   and 42% Impairment   Patient verbally states that they understand that the information they have provided above is current and complete to the best of their knowledge.     Initial Primary G Code and Modifier:    Per the Patient's intake and/or assessment the Primary G Code is: Mobility .   The Patient's Impairment, Limitation or Restriction Modifier would be best described as: CK - 40% - 60% Impairment.   Goal Primary G Code and Modifier:    The Patient's G Code Goal would be: Mobility    The Patient's Impairment, Limitation or Restriction Modifier goal would be best described as: CI - 1% - 20% Impairment.         Objective    Today's Intervention:      NuStep: 7:00 resistance 7, arms 8, seat 6  SLS EO - mod sway x30 sec, EC- 3-5 sec  Sharpened Romberg R in  front 30 sec, L in front 20 sec EC    Dejan dote-[place] negative B, went through technique for Epley maneuver if needed and gave printout for home positioning, sounds like she's been doing similar to that in past as needed.     Ultrasound: continuous, 2.0 w/cm2 in sidelying with pillow between knees, 10' to L thoracolumbar paraspinals, 15' total with set up    Deferred today:   Standing trunk ext  Scap sets  Supine:  - pelvic tilt x20  - tilt + marching (modified from SLR today when more sore)  - nerve glides L 2x30, R x30  Walking with head turns x75 ft with CGA, she had to step to correct gait a few times - very challenging  Walking tandem x75 ft with CGA, no touches on the wall for support but stepped out of position 2-3 times to maintain balance   Walking backwards x75 ft SBA  Side steps B x75 ft (L leg still feels heavier)  Row with blue band x10- hard to not lean, green x15, better technique, sent both home with her  Standing with arms against wall: thoracic/lumbar rotation x10 B  Marching in hallway with opposite arm swings x100 ft, SBA        Home Exercise Program:  Access Code: [email or web address] URL: [email or web address] Date: 04/04/2017 Prepared by: Yajaira Vance   Exercises   Sidelying Thoracic Lumbar Rotation - 10 reps - 5 hold - 2x daily Bx10  Supine Posterior Pelvic Tilt - 20 reps - 5 hold - 2x daily   Supine Pelvic Tilt with Straight Leg Raise - 10 reps - 2x daily   Supine Sciatic Nerve Glide - 30 reps - 2x daily   Standing Lumbar Extension - 5-10 reps - 2 hold - 2x daily   Seated Scapular Retraction - 20 reps - 5 hold - 2x daily     Added to HEP: (just gave a note)  - tandem/sharpened Romberg EC  - SLS EO  - Marching with arms  - modified SLR to tilt + marching as needed    Access Code: [email or web address] URL: [email or web address] Date: 05/09/2017 Prepared by: Yajaira Vance   Exercises   Self-Epley Maneuver Right Ear - 1-3 minute hold - 1x daily   Self-Epley Maneuver Left Ear - 1-3 minute  hold - 1x daily       Assessment    Response to Intervention:  Patient was able to demonstrate HEP properly today.  Patient feels comfortable with HEP.      Therapist Assessment / Clinical Impression: Patient presents with signs and symptoms congruent with nerve involvement thoracic/lumbar disc herniations and will benefit from skilled PT to increase ROM, strength, decrease pain, and improve function.      Functional Impairment(s):  See subjective on initial evaluation and Functional Assessment / Summary Report from PSFS.    Physical Impairment(s):       MUSCULOSKELETAL:  Loss of Motion, Pain and Weakness      Patient Goal (time reference required): Patient would like to be able to walk 4 miles with tight/discomfort level of 3/10 or less in 8 weeks.     Functional therapy goals:   Patient is to be independent in their Home Exercise Program in 2 weeks.  Patient is to tolerate walking with normal gait up to 2 miles with 5/10 tightness/discomfort level in 4 weeks.  Patient is to have improved spine mobility to allow for improved household activities including sweeping/vacuuming with 5/10 tightness/discomfort level in 4 weeks.  Patient is to tolerate walking with normal gait up to 4 miles with 3/10 tightness/discomfort level in 8 weeks.  Patient is to have improved spine mobility to allow for improved yard work including gardening with 3/10 tightness/discomfort level in 8 weeks.  Patient is to self-manage symptoms and return to prior function in 8 weeks.      Patient participated in goal selection and understand(s) the plan of care: Yes   Patient Potential for Achieving Desired Outcome:  Good      Plan    Treatment Plan / Targeted Outcomes:     Frequency:   16 visits     Duration of Treatment: 8 weeks    Planned Interventions:  Possible physical therapy interventions include but are not limited to:   Home Exercise Program development  Therapeutic Exercise (ROM & Strengthening)  Manual Therapy  Neuromuscular  Re-education  Ultrasound  Electrical Stimulation  Phonophoresis with Ketoprofen  Iontophoresis with Dexamethasone  Therapeutic Activities  Gait Training  Posture and Body Mechanics Education  Mechanical traction if indicated    Plan for next visit:  Advance core strength, ROM, balance, LE strength, add modalities as needed to manage pain and increase function.     Student or PTA has been instructed in and demonstrates skills necessary to carry out above stated treatment plan: Yes    Thank you for your referral to [facility].  Please call with any questions, concerns or comments.  [phone]  RAMÍREZ MontesinosT

## 2018-01-04 NOTE — NURSING NOTE
Patient Information     Patient Name MRN Sex DOB Hultman, Corinne G [id number] Female 1950      Nursing Note by Radha Lott at 2017  8:50 AM     Author:  Radha Lott Service:  (none) Author Type:  (none)     Filed:  2017  9:16 AM Encounter Date:  2017 Status:  Signed     :  Radha Lott            Universal Protocol    A. Pre-procedure verification complete yes  1-relevant information / documentation available, reviewed and properly matched to the patient; 2-consent accurate and complete, 3-equipment and supplies available    B. Site marking complete Yes  Site marked if not in continuous attendance with patient    C. TIME OUT completed yes  Time Out was conducted just prior to starting procedure to verify the eight required elements: 1-patient identity, 2-consent accurate and complete, 3-position, 4-correct side/site marked (if applicable), 5-procedure, 6-relevant images / results properly labeled and displayed (if applicable), 7-antibiotics / irrigation fluids (if applicable), 8-safety precautions.

## 2018-01-04 NOTE — INITIAL ASSESSMENTS
Patient Information     Patient Name MRN Sex DOB Hultman, Corinne G [id number] Female 1950      Initial Assessments by Yajaira Vance PT at 2017 10:36 AM     Author:  Yajaira Vance PT Service:  (none) Author Type:  PT- Physical Therapist     Filed:  2017 10:53 AM Date of Service:  2017 10:36 AM Status:  Signed     :  Yajaira Vance PT (PT- Physical Therapist)            [facility]  Outpatient PT - Initial Evaluation  Spine Evaluation         Date of Service: 2017   Visit #1   Patient Name: Corinne G Hultman   YOB: 1950   Referring MD/Provider: Reta Bridges PA  Medical and Treatment Diagnosis: Other intervertebral disc disorders, thoracic region [M51.84]  Treatment Diagnosis:  Thoracic and lumbar back pain with radicular sx L leg  Insurance: Medicare: G Codes/C Modifiers at Initial Assessment:     Start of Service: 2017   Certification Dates: Start of Service: 2017   Medicare/MA Re-Cert Due:     Living Situations:  Independent in Living Situation  Preadmission Functional Mobility: Independent  Precautions:  None  Cognition:  Oriented to Person, Place, and Time.    Were cultural / age or other special adaptations needed? No      Patient is a vulnerable adult: No      Patient is aware of diagnosis: Yes      Risks and benefits explained: Yes    Subjective      History of Injury/reason for PT: Patient comes in with numbness in L hip to toes since , she had trip out to [place] for miliary graduation, 14 hours standing for 2 days in a row on concrete and has had pain and numbness since.  She likes to walk 4 miles (most days, but not on days that she works 10 hours), but can't do much over a mile at this time, usually walks with neighbor and has noticed gait is slower now too.  Dr. Elizabeth MD did her cervical surgery, he was out of town x3 weeks so she saw SHILO Schwartz at [facility], and she feels the disc herniation at  T10-11 spine is what is causing the problem and doesn't do as well with surgery.  Didn't think that smaller herniations in lumbar spine are problem.  Patient sometimes gets charleyhorses in L leg that are very bad.  Feels like L leg is completely numb, concerned about getting rock in shoe and not being able to feel it and causing problems, also walks on gravel or getting in/out of tub is harder, very careful.  Pain is only 2-3/10, but more uncomfortable solid 8/10 like tunicate on whole leg.  Hard to tell temperature.  Also has h/o chemo on lymph nodes on bottom of mandible.  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  Able to push through all activity.  Glad it's L leg so not affecting driving.  Hasn't tried heat or cold, not sure it would be good- she can't feel anything.  Taking Tylenol 3 at night when it's more achy in back and tight feel in leg worse by the end of the day.     Symptoms at evaluation:  ?   Decreased Motion always feels stiff, not sure if it is actually limited though  Weakness L leg feels weaker  Instability unsteady to lean on L side  Swelling no  Tingling/Numbness - to tips of toes all the way from perineal area   Bowel/bladder changes had incontinence with dancing   Pain Rating:    Pain is 2-3/10, uncomfortable solid 8/10 like tunicate on whole leg.   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   Initial  Assessment:   We want to know what 3 activities in your life you are unable to perform, or are having the most difficulty performing, as a result of your chief problem. Please list and score at least 3 activities that you are unable to perform, or having the most difficulty performing, because of your chief problem.   Patient Specific Activity Scoring Scheme (score one number for each activity):   Activity Score (0-10)  0= Unable to perform activity  10= Able to perform activity at same level as before injury or problem   1. Cleaning  7.5/10   2. Walking (usually 4 miles, doing 1.5 mile) 7.5/10   3. Aching at end of day 2.5/10   4.  /10   5.  /10   Totals:  17.5/30 = 58% Function   and 42% Impairment   Patient verbally states that they understand that the information they have provided above is current and complete to the best of their knowledge.     Initial Primary G Code and Modifier:    Per the Patient's intake and/or assessment the Primary G Code is: Mobility .   The Patient's Impairment, Limitation or Restriction Modifier would be best described as: CK - 40% - 60% Impairment.   Goal Primary G Code and Modifier:    The Patient's G Code Goal would be: Mobility    The Patient's Impairment, Limitation or Restriction Modifier goal would be best described as: CI - 1% - 20% Impairment.       Prior Level of Function: No difficulty completing the above functional activities prior to onset.      Previous Injury / Surgery: h/o lumbar laminectomy, cervical fusion     Previous Treatment:    Pain Meds / Anti-inflammatory Meds    ?     Diagnostics:       MRI- yes        Results:   Thoracic MRI 3/17/17: IMPRESSION:   1. Left posterior disc herniation at T10-T11 causing mild narrowing of the left neural foramen and slight mass effect on the ventral spinal cord.  2. Multilevel tiny posterior disc herniations but central spinal canal and neural foramen are otherwise patent throughout the remainder of the thoracic  spine.  Lumbar MRI 1/20/17:IMPRESSION:     Scattered degenerative changes the lumbar spine as described in detail above. Postoperative changes of prior left hemilaminectomy at L4-5 without evidence of recurrent focal disc pathology.    Current Medications:   ? See chart of medications    Drug Allergies:  ? See chart for allergies  ?   Latex Allergy: See chart for allergies     Past Medical History:     Diagnosis  Date     Actinic keratosis 2/22/2013     Closed fracture of lateral malleolus 5/17/2010     Degeneration of cervical intervertebral disc      Disorder of bone and cartilage, unspecified      Menopause age 50     Open fracture of lateral malleolus      Osteoporosis     on Fosamax for 17 years--stopped 2011      Other malignant lymphomas, unspecified site, extranodal and solid organ sites 2011     Rheumatic fever 10+ years ago     Tension headache      Vertigo      Past Surgical History:      Procedure  Laterality Date     BREAST BIOPSY  mid 1990s    benign       CERVICAL FUSION  12/2012    C6-7 on left with discectomy       COLONOSCOPY SCREENING  age 55    normal       COLONOSCOPY SCREENING  12/10/2015    lnormal--repeat 10 years       LUMBAR LAMINECTOMY  1984    L4-5       DE TX ECTOP PREG BY LAPAROSCOPE Left 1974    left tube and ovary removed       SKIN BIOPSY      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       TONSILLECTOMY  1959     TUBAL LIGATION      FOR ECTOPIC PREGNANCY         Additional Significant PMHX: Pacemaker no, DM no      Objective    Items left blank indicate that the test was inappropriate or not meaningful at the time of evaluation and therefore not performed.    Fall Risk Screening:  No risk factors identified     Posture/Structural:   Head and Neck Position: moderate forward, reports that has been a long time   Shoulders/scapulae: mild forward   Thoracic spine: tight paraspinals   Lumbar lordosis: decreased    Gait: normal    ROM/Strength: Grade 5 equals normal   Cerv Thor  Lumbar Myotomes    L    R     L    R   Flexion   Mild limit C4 Shoulder Elev.   L2 Hip Flexion 4+ 5   Extension   Severe limit C5 Shoulder Abd.   L3 Knee Ext.      5- 5   Left Lat. Flex   Mild limit C6 Elbow Flexion   L4 Ankle DF 5- 5   Right Lat. Flex   Mild limit C7 Elbow Ext.   L5 1st MTP Ext.     Left Rotation   Mild limit C8 Finger Flex   S1 Ankle P.F. 4 5   Right Rotation   Mild limit T1 Finger Abd.   S2 Knee Flexion 4 5          Hip Abduction 5- 5          Hip Adduction 4 5          Hip Extension 4 5          Hip ER         Special tests: SLR negative B, LE ROM good    Palpation: tight in B thoracolumbar paraspinals    Today's Intervention:  Evaluation completed, started patient on HEP as following, educated on posture/body mechanics.     Home Exercise Program:  Access Code: [email or web address] URL: [email or web address] Date: 04/04/2017 Prepared by: Yajaira Vance   Exercises   Sidelying Thoracic Lumbar Rotation - 10 reps - 5 hold - 2x daily Bx10  Supine Posterior Pelvic Tilt - 20 reps - 5 hold - 2x daily   Supine Pelvic Tilt with Straight Leg Raise - 10 reps - 2x daily   Supine Sciatic Nerve Glide - 30 reps - 2x daily   Standing Lumbar Extension - 5-10 reps - 2 hold - 2x daily   Seated Scapular Retraction - 20 reps - 5 hold - 2x daily     Next: test/add balance, standing row, marching, core and LE    Assessment    Response to Intervention:  Patient was able to demonstrate HEP properly today.  Patient feels comfortable with HEP.      Therapist Assessment / Clinical Impression: Patient presents with signs and symptoms congruent with nerve involvement thoracic/lumbar disc herniations and will benefit from skilled PT to increase ROM, strength, decrease pain, and improve function.      Functional Impairment(s):  See subjective on initial evaluation and Functional Assessment / Summary Report from PSFS.    Physical Impairment(s):       MUSCULOSKELETAL:  Loss of Motion, Pain and Weakness      Patient Goal (time  reference required): Patient would like to be able to walk 4 miles with tight/discomfort level of 3/10 or less in 8 weeks.     Functional therapy goals:   Patient is to be independent in their Home Exercise Program in 2 weeks.  Patient is to tolerate walking with normal gait up to 2 miles with 5/10 tightness/discomfort level in 4 weeks.  Patient is to have improved spine mobility to allow for improved household activities including sweeping/vacuuming with 5/10 tightness/discomfort level in 4 weeks.  Patient is to tolerate walking with normal gait up to 4 miles with 3/10 tightness/discomfort level in 8 weeks.  Patient is to have improved spine mobility to allow for improved yard work including gardening with 3/10 tightness/discomfort level in 8 weeks.  Patient is to self-manage symptoms and return to prior function in 8 weeks.      Patient participated in goal selection and understand(s) the plan of care: Yes   Patient Potential for Achieving Desired Outcome:  Good      Plan    Treatment Plan / Targeted Outcomes:     Frequency:   16 visits     Duration of Treatment: 8 weeks    Planned Interventions:  Possible physical therapy interventions include but are not limited to:   Home Exercise Program development  Therapeutic Exercise (ROM & Strengthening)  Manual Therapy  Neuromuscular Re-education  Ultrasound  Electrical Stimulation  Phonophoresis with Ketoprofen  Iontophoresis with Dexamethasone  Therapeutic Activities  Gait Training  Posture and Body Mechanics Education  Mechanical traction if indicated    Plan for next visit:  Advance core strength, ROM, balance, LE strength, add modalities as needed to manage pain and increase function.     Student or PTA has been instructed in and demonstrates skills necessary to carry out above stated treatment plan: Yes    Thank you for your referral to [facility].  Please call with any questions, concerns or comments.  [phone]  Yajaira Vance,  DPT    The signature, of the referring medical provider, on this document indicates certification of the above prescribed plan of care and is medically necessary.      X____________________________________________________    Provider Signature                     Date  Time

## 2018-01-04 NOTE — TELEPHONE ENCOUNTER
Patient Information     Patient Name MRN Sex DOB Hultman, Corinne G [id number] Female 1950      Telephone Encounter by Babs Rivas RN at 2017 10:28 AM     Author:  Babs Rivas RN Service:  (none) Author Type:  (none)     Filed:  2017 10:29 AM Encounter Date:  2017 Status:  Signed     :  Babs Rivas RN (NURS- Registered Nurse)            This is a Refill request from: [facility]  Name of Medication: Fioricet   Quantity requested: #90 R-1  Last fill date: 2016  Due for refill: Yes  Last visit with AMAURY MOTA was on: 2017 in [facility]  PCP:  Amaury Mota MD  Controlled Substance Agreement:  NONE   Diagnosis r/t this medication request: Tension headache     Unable to complete prescription refill per RN Medication Refill Policy.................... Babs Rivas ....................  2017   10:28 AM

## 2018-01-04 NOTE — PROGRESS NOTES
Patient Information     Patient Name MRN Sex DOB Hultman, Corinne G [id number] Female 1950      Progress Notes by Yajaira Vance PT at 2017 11:14 AM     Author:  Yajaira Vance PT  Service:  (none) Author Type:  PT- Physical Therapist     Filed:  2017 12:53 PM  Date of Service:  2017 11:14 AM Status:  Addendum     :  Yajaira Vance PT (PT- Physical Therapist)        Related Notes: Original Note by Yajaira Vance PT (PT- Physical Therapist) filed at 2017 12:51 PM            [facility]  Outpatient PT - Daily Note         Date of Service: 2017   Visit #2  Patient Name: Corinne G Hultman   YOB: 1950   Referring MD/Provider: Reta Bridges PA  Medical and Treatment Diagnosis: Other intervertebral disc disorders, thoracic region [M51.84]  Treatment Diagnosis:  Thoracic and lumbar back pain with radicular sx L leg  Insurance: Medicare: G Codes/C Modifiers at Initial Assessment:     Start of Service: 2017   Certification Dates: Start of Service: 2017   Medicare/MA Re-Cert Due:     Subjective      Current status:  Patient reports L leg feels a lot heavier with exercises than R.  She also noticed R side LB was a little flared up after last visit as she's been doing HEP on both sides.  She did try the heat and that helped a little bit.   1 appt next week then off for 3 weeks while in [place].      Pain/uncomfortable - tight feeling: This am 5/10, after IBP and laid on heating pad it is improved    History of Injury/reason for PT: Patient comes in with numbness in L hip to toes since , she had trip out to [place] for miliary graduation, 14 hours standing for 2 days in a row on concrete and has had pain and numbness since.  She likes to walk 4 miles (most days, but not on days that she works 10 hours), but can't do much over a mile at this time, usually walks with neighbor and has noticed gait is slower now too.  Dr. Elizabeth MD  did her cervical surgery, he was out of town x3 weeks so she saw SHILO Schwartz at [facility], and she feels the disc herniation at T10-11 spine is what is causing the problem and doesn't do as well with surgery.  Didn't think that smaller herniations in lumbar spine are problem.  Patient sometimes gets charleyhorses in L leg that are very bad.  Feels like L leg is completely numb, concerned about getting rock in shoe and not being able to feel it and causing problems, also walks on gravel or getting in/out of tub is harder, very careful.  Pain is only 2-3/10, but more uncomfortable solid 8/10 like tunicate on whole leg.  Hard to tell temperature.  Also has h/o chemo on lymph nodes on bottom of mandible.  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  Able to push through all activity.  Glad it's L leg so not affecting driving.  Hasn't tried heat or cold, not sure it would be good- she can't feel anything.  Taking Tylenol 3 at night when it's more achy in back and tight feel in leg worse by the end of the day.     Symptoms at evaluation:  ?   Decreased Motion always feels stiff, not sure if it is actually limited though  Weakness L leg feels weaker  Instability unsteady to lean on L side  Tingling/Numbness - to tips of toes all the way from perineal area   Bowel/bladder changes had incontinence with dancing   Pain Rating:    Pain is 2-3/10, uncomfortable solid 8/10 like tunicate on whole leg.   Pain Location:  L leg, some in back  Aggravation Factors: cleaning, vacuum, scrubbed floors- hurting into low back after cleaning last weekend 3 hours, walking - feels like pelvis gets weak like everything going to fall out, doesn't like to get too far away from home with walking  Easing Factors: rest  Occupation: nurse- 2 days per week    Regular duties include: patient handling   Previous Injury / Surgery: h/o lumbar laminectomy,  cervical fusion      Patient Specific Functional and Pain Scales (PSFS) completed 4/4/2017  Clinician Instructions: Complete after the history and before the exam.   Initial Assessment:   We want to know what 3 activities in your life you are unable to perform, or are having the most difficulty performing, as a result of your chief problem. Please list and score at least 3 activities that you are unable to perform, or having the most difficulty performing, because of your chief problem.   Patient Specific Activity Scoring Scheme (score one number for each activity):   Activity Score (0-10)  0= Unable to perform activity  10= Able to perform activity at same level as before injury or problem   1. Cleaning  7.5/10   2. Walking (usually 4 miles, doing 1.5 mile) 7.5/10   3. Aching at end of day 2.5/10   4.  /10   5.  /10   Totals:  17.5/30 = 58% Function   and 42% Impairment   Patient verbally states that they understand that the information they have provided above is current and complete to the best of their knowledge.     Initial Primary G Code and Modifier:    Per the Patient's intake and/or assessment the Primary G Code is: Mobility .   The Patient's Impairment, Limitation or Restriction Modifier would be best described as: CK - 40% - 60% Impairment.   Goal Primary G Code and Modifier:    The Patient's G Code Goal would be: Mobility    The Patient's Impairment, Limitation or Restriction Modifier goal would be best described as: CI - 1% - 20% Impairment.         Objective    Today's Intervention:  Evaluation completed, started patient on HEP as following, educated on posture/body mechanics.   NuStep: 5:00 resistance 7, arms 8, seat 6  Romberg EO & EC no problem (mild sway)  SLS EO - mod sway x30 sec, EC- 3-5 sec  Sharpened Romberg R in front 30 sec, L in front 20 sec EC  Marching in hallway with opposite arm swings x200 ft, cuing needed at first to get rhythm then just SBA  Walking with head turns x75 ft  with CGA, she had to step to correct gait a few times  Walking tandem x75 ft with CGA, 2-3 times touched the wall for suport  Fast/slow walking x75 ft SBA - not difficult    Supine:  - pelvic tilt x20  - tilt + marching (modified from SLR today when more sore)  - nerve glides L 2x30, R x30    Sidelying: thoracic/lumbar rotation x10 B    Ultrasound: continuous, 2.0 w/cm2 in sidelying with pillow between knees, 5' to L thoracolumbar paraspinals, 5' to lumbosacral paraspinals, 15' total with set up    Deferred today:   Standing trunk ext  Scap sets    Next: add standing row, core and LE      Home Exercise Program:  Access Code: [email or web address] URL: [email or web address] Date: 04/04/2017 Prepared by: Yajaira Vance   Exercises   Sidelying Thoracic Lumbar Rotation - 10 reps - 5 hold - 2x daily Bx10  Supine Posterior Pelvic Tilt - 20 reps - 5 hold - 2x daily   Supine Pelvic Tilt with Straight Leg Raise - 10 reps - 2x daily   Supine Sciatic Nerve Glide - 30 reps - 2x daily   Standing Lumbar Extension - 5-10 reps - 2 hold - 2x daily   Seated Scapular Retraction - 20 reps - 5 hold - 2x daily     Added to HEP: (just gave a note)  - tandem/sharpened Romberg EC  - SLS EO  - Marching with arms  - modified SLR to tilt + marching as needed    Assessment    Response to Intervention:  Patient was able to demonstrate HEP properly today.  Patient feels comfortable with HEP.      Therapist Assessment / Clinical Impression: Patient presents with signs and symptoms congruent with nerve involvement thoracic/lumbar disc herniations and will benefit from skilled PT to increase ROM, strength, decrease pain, and improve function.      Functional Impairment(s):  See subjective on initial evaluation and Functional Assessment / Summary Report from PSFS.    Physical Impairment(s):       MUSCULOSKELETAL:  Loss of Motion, Pain and Weakness      Patient Goal (time reference required): Patient would like to be able to walk 4 miles with  tight/discomfort level of 3/10 or less in 8 weeks.     Functional therapy goals:   Patient is to be independent in their Home Exercise Program in 2 weeks.  Patient is to tolerate walking with normal gait up to 2 miles with 5/10 tightness/discomfort level in 4 weeks.  Patient is to have improved spine mobility to allow for improved household activities including sweeping/vacuuming with 5/10 tightness/discomfort level in 4 weeks.  Patient is to tolerate walking with normal gait up to 4 miles with 3/10 tightness/discomfort level in 8 weeks.  Patient is to have improved spine mobility to allow for improved yard work including gardening with 3/10 tightness/discomfort level in 8 weeks.  Patient is to self-manage symptoms and return to prior function in 8 weeks.      Patient participated in goal selection and understand(s) the plan of care: Yes   Patient Potential for Achieving Desired Outcome:  Good      Plan    Treatment Plan / Targeted Outcomes:     Frequency:   16 visits     Duration of Treatment: 8 weeks    Planned Interventions:  Possible physical therapy interventions include but are not limited to:   Home Exercise Program development  Therapeutic Exercise (ROM & Strengthening)  Manual Therapy  Neuromuscular Re-education  Ultrasound  Electrical Stimulation  Phonophoresis with Ketoprofen  Iontophoresis with Dexamethasone  Therapeutic Activities  Gait Training  Posture and Body Mechanics Education  Mechanical traction if indicated    Plan for next visit:  Advance core strength, ROM, balance, LE strength, add modalities as needed to manage pain and increase function.     Student or PTA has been instructed in and demonstrates skills necessary to carry out above stated treatment plan: Yes    Thank you for your referral to [facility].  Please call with any questions, concerns or comments.  [phone]  Yajaira Vance DPT

## 2018-01-04 NOTE — NURSING NOTE
Patient Information     Patient Name MRN Sex DOB Hultman, Corinne G [id number] Female 1950      Nursing Note by Radha Lott at 2017  8:50 AM     Author:  Radha Lott Service:  (none) Author Type:  (none)     Filed:  2017  8:55 AM Encounter Date:  2017 Status:  Signed     :  Radha Lott            Here today for a 6 week follow up on lesions.  Radha Lott LPN..........2017  8:53 AM

## 2018-01-04 NOTE — PROGRESS NOTES
Patient Information     Patient Name MRN Sex DOB Hultman, Corinne G [id number] Female 1950      Progress Notes by Ashia Dill MD at 3/21/2017 12:45 PM     Author:  Ashia Dill MD Service:  (none) Author Type:  Physician     Filed:  3/21/2017  2:00 PM Encounter Date:  3/21/2017 Status:  Signed     :  Ashia Dill MD (Physician)            SUBJECTIVE:  67 y.o. female presents for lesion removal from her right thigh and her right chest wall. These lesions have been present for months but are growing and becoming more nodular and rough.    OBJECTIVE:  1.0 x 0.8 cm pink, rough slightly nodular lesion anterior right chest wall, 0.5 x 0.4 x 0.3 cm rough, pink scaly lesion right lateral upper leg    ASSESSMENT:  Lesion size:as above  Defect size:thigh 0.8 x 0.5 x 0.3 cm, chest wall 1.3 x 1.0 x 0.2 cm    PROCEDURE:  The pathophysiology of skin lesions and skin cancers was discussed with the patient. The risks, benefits and alternatives to excision of the lesion were discussed with the patient, including the risks of infection, scarring, bruising, bleeding and the possible need for further procedures. The patient expressed understanding and wishes to proceed. Informed consent paperwork was completed.    Chloraprep was used to cleanse the skin in the area of the lesions. 1% Lidocaine with epinephrine was infiltrated in the skin and subcutaneous tissue in the area of the lesions. When appropriate anesthesia had been achieved, the lesion on the right thigh was sharply excised with a margin of grossly normal tissue. The skin edges were reapproximated using 5-0 Ethilon. Sterile dressing was applied. The specimen was labelled and sent to pathology for evaluation. The lesion on the chest wall was then sharply excised with a margin of grossly normal tissue. The skin edges were reapproximated using 4-0 Vicryl and 5-0 Ethilon. Sterile dressing was applied. The specimen was labelled and sent to pathology for  evaluation. The procedure was well tolerated without complications. Patient was given post procedure instructions and denied further questions. We will call the patient with pathology results.    Ashia Dill MD

## 2018-01-04 NOTE — PATIENT INSTRUCTIONS
Patient Information     Patient Name MRN Sex DOB Hultman, Corinne G [id number] Female 1950      Patient Instructions by Ashia Dill MD at 2017  8:50 AM     Author:  Ashia Dill MD Service:  (none) Author Type:  Physician     Filed:  2017  9:22 AM Encounter Date:  2017 Status:  Signed     :  Ashia Dill MD (Physician)            Your incision was closed with stitches that will need to be removed. It is ok to remove the dressing and get the incision wet in the shower on the day after your procedure. Don't soak in a tub, pool or lake for 5 days.  If you have concerns, please call.           
no rash/no dryness/no itching

## 2018-01-04 NOTE — PROGRESS NOTES
Patient Information     Patient Name MRN Sex DOB Hultman, Corinne G [id number] Female 1950      Progress Notes by Ashia Dill MD at 2017  8:50 AM     Author:  Ashia Dill MD Service:  (none) Author Type:  Physician     Filed:  2017  9:27 AM Encounter Date:  2017 Status:  Signed     :  Ashia Dill MD (Physician)            SUBJECTIVE:  67 y.o. female presents for skin check and possible lesion removal. She had a basal cell cancer removed from her chest wall and an atypical squamous lesion removed from her right thigh. The both had positive margins. The led scar is soft and flat. The scar on her chest wall is slightly rough and nodular in one area.    OBJECTIVE:  0.3 x 0.2 cm area of possible residual basal cell cancer anterior chest wall-right side    ASSESSMENT:  Lesion size: as above  Defect size:1.0 x 0.5 x 0.3 cm    PROCEDURE:  The pathophysiology of skin lesions and skin cancers was discussed with the patient. The risks, benefits and alternatives to excision of the lesion were discussed with the patient, including the risks of infection, scarring, bruising, bleeding and the possible need for further procedures. The patient expressed understanding and wishes to proceed. Informed consent paperwork was completed.    Chloraprep was used to cleanse the skin in the area of the lesion. 1% Lidocaine with epinephrine was infiltrated in the skin and subcutaneous tissue in the area of the lesion. When appropriate anesthesia had been achieved, the lesion was sharply excised with a margin of grossly normal tissue. The skin edges were reapproximated using 5-0 Ethilon. Sterile dressing was applied. The specimen was oriented and labelled and sent to pathology for evaluation. The procedure was well tolerated without complications. Patient was given post procedure instructions and denied further questions. We will call the patient with pathology results.    Ashia Dill MD

## 2018-01-05 ENCOUNTER — COMMUNICATION - GICH (OUTPATIENT)
Dept: FAMILY MEDICINE | Facility: OTHER | Age: 68
End: 2018-01-05

## 2018-01-05 NOTE — PROGRESS NOTES
Patient Information     Patient Name MRN Sex DOB Hultman, Corinne G [id number] Female 1950      Progress Notes by Ashia Dill MD at 2017  8:20 AM     Author:  Ashia Dill MD Service:  (none) Author Type:  Physician     Filed:  2017 10:01 AM Encounter Date:  2017 Status:  Signed     :  Ashia Dill MD (Physician)            Patient presents for post procedure visit after excision of basal cell scar from her chest wall on . Patient has done well. No problems with incision.    /58  Pulse 68  Wt 52.6 kg (116 lb)  Breastfeeding? No  BMI 21.22 kg/m2    General: NAD, pleasant and cooperative with exam and interview.  Skin: healing incision chest wall. No sign of infection. No pain with palpation. Sutures removed without difficulty.  Psychiatry: awake, alert and oriented. Appropriate affect.  Pathology results: previous site healing with no residual cancer cells noted.   Assessment/Plan:  Discussed procedure and pathology results. Patient can return to normal activities. Continue to monitor for new or changing lesions. Encouraged sunscreen use, SPF 30 or greater. Patient will call with questions or concerns.

## 2018-01-05 NOTE — NURSING NOTE
Patient Information     Patient Name MRN Sex DOB Hultman, Corinne G [id number] Female 1950      Nursing Note by Ashia Smith at 2017  8:20 AM     Author:  Ashia Smith Service:  (none) Author Type:  (none)     Filed:  2017  8:57 AM Encounter Date:  2017 Status:  Signed     :  Ashia Smith            Patient presents to clinic for suture removal on Rt upper chest.  Ashia Smtih LPN............................2017 8:22 AM

## 2018-01-05 NOTE — DISCHARGE SUMMARY
Patient Information     Patient Name MRN Sex DOB Hultman, Corinne G [id number] Female 1950      Discharge Summaries by Yajaira Vance PT at 2017  7:59 AM     Author:  Yajaira Vance PT Service:  (none) Author Type:  PT- Physical Therapist     Filed:  2017  9:10 AM Date of Service:  2017  7:59 AM Status:  Signed     :  Yajaira Vance PT (PT- Physical Therapist)            [facility]  Outpatient PT -   Discharge / Daily Note         Date of Discharge and Service: 2017   Visit #5  Patient Name: Corinne G Hultman   YOB: 1950   Referring MD/Provider: Reta Bridges PA  Medical and Treatment Diagnosis: Other intervertebral disc disorders, thoracic region [M51.84]  Treatment Diagnosis:  Thoracic and lumbar back pain with radicular sx L leg  Insurance: Medicare: G Codes/C Modifiers at Initial Assessment:     Start of Service: 2017   Certification Dates: Start of Service: 2017   Medicare/MA Re-Cert Due: 17    Subjective      Current status:  Patient feels about the same.  Patient got to talk to nurse practitioner who had talked to surgeon, can't due surgery because of tension on spinal cord ends at bottom of thoracic spine so potential risks outweigh the benefits.  Been able to get out and walk 3 miles.  She rode on 4 gupta about 6 miles around 1 hour and it felt ok.  Tingling about the same, it was worse with a lot of riding in car or evening if been more active or walking 3 miles, but still constant, rest does relieve it some.  She feels overall at 70% of function.  Vertigo symptoms have resolved and she feels comfortable with home maneuvers if needed.      Pain/uncomfortable - tight feelin/10      History of Injury/reason for PT: Patient comes in with numbness in L hip to toes since , she had trip out to [place] for miliary graduation, 14 hours standing for 2 days in a row on concrete and has had pain and  numbness since.  She likes to walk 4 miles (most days, but not on days that she works 10 hours), but can't do much over a mile at this time, usually walks with neighbor and has noticed gait is slower now too.  Dr. Elizabeth MD did her cervical surgery, he was out of town x3 weeks so she saw SHILO Schwartz at [facility], and she feels the disc herniation at T10-11 spine is what is causing the problem and doesn't do as well with surgery.  Didn't think that smaller herniations in lumbar spine are problem.  Patient sometimes gets charleyhorses in L leg that are very bad.  Feels like L leg is completely numb, concerned about getting rock in shoe and not being able to feel it and causing problems, also walks on gravel or getting in/out of tub is harder, very careful.  Pain is only 2-3/10, but more uncomfortable solid 8/10 like tunicate on whole leg.  Hard to tell temperature.  Also has h/o chemo on lymph nodes on bottom of mandible.  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  Able to push through all activity.  Glad it's L leg so not affecting driving.  Hasn't tried heat or cold, not sure it would be good- she can't feel anything.  Taking Tylenol 3 at night when it's more achy in back and tight feel in leg worse by the end of the day.     Symptoms at evaluation:  ?   Decreased Motion always feels stiff, not sure if it is actually limited though  Weakness L leg feels weaker  Instability unsteady to lean on L side  Tingling/Numbness - to tips of toes all the way from perineal area   Bowel/bladder changes had incontinence with dancing   Pain Rating:    Pain is 2-3/10, uncomfortable solid 8/10 like tunicate on whole leg.   Pain Location:  L leg, some in back  Aggravation Factors: cleaning, vacuum, scrubbed floors- hurting into low back after cleaning last weekend 3 hours, walking - feels like pelvis gets weak like everything going  to fall out, doesn't like to get too far away from home with walking  Easing Factors: rest  Occupation: nurse- 2 days per week    Regular duties include: patient handling   Previous Injury / Surgery: h/o lumbar laminectomy, cervical fusion      Patient Specific Functional and Pain Scales (PSFS) completed 5/16/2017   We want to know what 3 activities in your life you are unable to perform, or are having the most difficulty performing, as a result of your chief problem. Please list and score at least 3 activities that you are unable to perform, or having the most difficulty performing, because of your chief problem.   Patient Specific Activity Scoring Scheme (score one number for each activity):   Activity Score (0-10)  0= Unable to perform activity  10= Able to perform activity at same level as before injury or problem   1. Cleaning  7/10   2. Walking (usually 4 miles, doing 1.5 mile) 7/10   3. Aching at end of day 7/10   4.  /10   5.  /10   Totals:  21/30 = 70% Function   and 30% Impairment   PSFS at evaluation 4/4/17: 17.5/30 = 58% Function and 42% Impairment    Patient verbally states that they understand that the information they have provided above is current and complete to the best of their knowledge.     Goal Primary G Code and Modifier:    The Patient's G Code Goal would be: Mobility    The Patient's Impairment, Limitation or Restriction Modifier goal would be best described as: CI - 1% - 20% Impairment.     Discharge Primary G Code and Modifier:    The Patient's status upon Discharge is Mobility    The Patient's Impairment, Limitation or Restriction Modifier would be best described as CJ - 20% - 40% Impairment.           Objective    Today's Intervention:      NuStep: 10:00 resistance 7, arms 10, seat 7  SLS EO - mod sway x30 sec, EC- 8 sec  Sharpened Romberg EC: R in front 25 sec, L in front 25 sec   Standing with arms against wall: thoracic/lumbar rotation x10 B  Row with blue band x20  Gaze  stabilization with arm extended x30 sec - no sx    Ultrasound: continuous, 2.0 w/cm2 in sidelying with pillow between knees, 10' to L thoracolumbar paraspinals, 15' total with set up    Deferred today:   Standing trunk ext  Supine:  - pelvic tilt x20  - tilt + marching (modified from SLR today when more sore)  - nerve glides L 2x30, R x30      Home Exercise Program:  Access Code: [email or web address] URL: [email or web address] Date: 04/04/2017 Prepared by: Yajaira Vance   Exercises   Sidelying Thoracic Lumbar Rotation - 10 reps - 5 hold - 2x daily Bx10  Supine Posterior Pelvic Tilt - 20 reps - 5 hold - 2x daily   Supine Pelvic Tilt with Straight Leg Raise - 10 reps - 2x daily   Supine Sciatic Nerve Glide - 30 reps - 2x daily   Standing Lumbar Extension - 5-10 reps - 2 hold - 2x daily   Seated Scapular Retraction - 20 reps - 5 hold - 2x daily     Added to HEP: (just gave a note)  - tandem/sharpened Romberg EC  - SLS EO  - Marching with arms  - modified SLR to tilt + marching as needed    Access Code: [email or web address] URL: [email or web address] Date: 05/09/2017 Prepared by: Yajaira Vance   Exercises   Self-Epley Maneuver Right Ear - 1-3 minute hold - 1x daily   Self-Epley Maneuver Left Ear - 1-3 minute hold - 1x daily       Assessment    Response to Intervention:  Patient was able to demonstrate HEP properly today.  Patient feels comfortable with HEP.      Therapist Assessment / Clinical Impression: Patient presents with signs and symptoms congruent with nerve involvement thoracic/lumbar disc herniations and will benefit from skilled PT to increase ROM, strength, decrease pain, and improve function.      Functional Impairment(s):  See subjective on initial evaluation and Functional Assessment / Summary Report from PSFS.    Physical Impairment(s):       MUSCULOSKELETAL:  Loss of Motion, Pain and Weakness      Patient Goal (time reference required): Patient would like to be able to walk 4 miles with  tight/discomfort level of 3/10 or less in 8 weeks. - partially met, up to 3 miles and pain 5/10    Functional therapy goals:   Patient is to be independent in their Home Exercise Program in 2 weeks. Goal met 5/16/2017   Patient is to tolerate walking with normal gait up to 2 miles with 5/10 tightness/discomfort level in 4 weeks. Goal met 5/16/2017   Patient is to have improved spine mobility to allow for improved household activities including sweeping/vacuuming with 5/10 tightness/discomfort level in 4 weeks.Goal met 5/16/2017   Patient is to tolerate walking with normal gait up to 4 miles with 3/10 tightness/discomfort level in 8 weeks.Goal partially met 5/16/2017   Patient is to have improved spine mobility to allow for improved yard work including gardening with 3/10 tightness/discomfort level in 8 weeks. Goal partially met 5/16/2017   Patient is to self-manage symptoms and return to prior function in 8 weeks.  Goal met 5/16/2017     Patient participated in goal selection and understand(s) the plan of care: Yes   Patient Potential for Achieving Desired Outcome:  Good      Plan    Treatment Plan / Targeted Outcomes:     Frequency:   16 visits     Duration of Treatment: 8 weeks    Planned Interventions:  Possible physical therapy interventions include but are not limited to:   Home Exercise Program development  Therapeutic Exercise (ROM & Strengthening)  Manual Therapy  Neuromuscular Re-education  Ultrasound  Electrical Stimulation  Phonophoresis with Ketoprofen  Iontophoresis with Dexamethasone  Therapeutic Activities  Gait Training  Posture and Body Mechanics Education  Mechanical traction if indicated    Plan for next visit:  D/C    Student or PTA has been instructed in and demonstrates skills necessary to carry out above stated treatment plan: Yes    Thank you for your referral to [facility].  Please call with any questions, concerns or comments.  [phone]  Yajaira Vance DPT

## 2018-01-08 ENCOUNTER — COMMUNICATION - GICH (OUTPATIENT)
Dept: FAMILY MEDICINE | Facility: OTHER | Age: 68
End: 2018-01-08

## 2018-01-08 DIAGNOSIS — G44.209 TENSION-TYPE HEADACHE, NOT INTRACTABLE: ICD-10-CM

## 2018-01-08 DIAGNOSIS — M50.30 OTHER CERVICAL DISC DEGENERATION, UNSPECIFIED CERVICAL REGION: ICD-10-CM

## 2018-01-23 ENCOUNTER — AMBULATORY - GICH (OUTPATIENT)
Dept: ORTHOPEDICS | Facility: OTHER | Age: 68
End: 2018-01-23

## 2018-01-23 DIAGNOSIS — S42.295D OTHER NONDISPLACED FRACTURE OF UPPER END OF LEFT HUMERUS, SUBSEQUENT ENCOUNTER FOR FRACTURE WITH ROUTINE HEALING: ICD-10-CM

## 2018-01-26 ENCOUNTER — DOCUMENTATION ONLY (OUTPATIENT)
Dept: FAMILY MEDICINE | Facility: OTHER | Age: 68
End: 2018-01-26

## 2018-01-26 ENCOUNTER — OFFICE VISIT - GICH (OUTPATIENT)
Dept: ORTHOPEDICS | Facility: OTHER | Age: 68
End: 2018-01-26

## 2018-01-26 ENCOUNTER — HISTORY (OUTPATIENT)
Dept: ORTHOPEDICS | Facility: OTHER | Age: 68
End: 2018-01-26

## 2018-01-26 ENCOUNTER — HOSPITAL ENCOUNTER (OUTPATIENT)
Dept: RADIOLOGY | Facility: OTHER | Age: 68
End: 2018-01-26
Attending: ORTHOPAEDIC SURGERY

## 2018-01-26 VITALS
DIASTOLIC BLOOD PRESSURE: 80 MMHG | HEIGHT: 61 IN | SYSTOLIC BLOOD PRESSURE: 126 MMHG | BODY MASS INDEX: 21.6 KG/M2 | BODY MASS INDEX: 20.47 KG/M2 | SYSTOLIC BLOOD PRESSURE: 126 MMHG | WEIGHT: 112.8 LBS | HEART RATE: 60 BPM | WEIGHT: 114.4 LBS | DIASTOLIC BLOOD PRESSURE: 88 MMHG | TEMPERATURE: 98 F

## 2018-01-26 VITALS
HEIGHT: 62 IN | SYSTOLIC BLOOD PRESSURE: 110 MMHG | WEIGHT: 117.13 LBS | BODY MASS INDEX: 21.55 KG/M2 | DIASTOLIC BLOOD PRESSURE: 82 MMHG

## 2018-01-26 VITALS
WEIGHT: 113.6 LBS | TEMPERATURE: 99.3 F | DIASTOLIC BLOOD PRESSURE: 76 MMHG | HEIGHT: 61 IN | WEIGHT: 115.4 LBS | SYSTOLIC BLOOD PRESSURE: 134 MMHG | SYSTOLIC BLOOD PRESSURE: 128 MMHG | SYSTOLIC BLOOD PRESSURE: 120 MMHG | HEIGHT: 62 IN | BODY MASS INDEX: 21.45 KG/M2 | HEART RATE: 62 BPM | DIASTOLIC BLOOD PRESSURE: 78 MMHG | DIASTOLIC BLOOD PRESSURE: 80 MMHG | BODY MASS INDEX: 21.23 KG/M2 | TEMPERATURE: 97.3 F | BODY MASS INDEX: 21.81 KG/M2 | WEIGHT: 117 LBS

## 2018-01-26 VITALS
DIASTOLIC BLOOD PRESSURE: 58 MMHG | SYSTOLIC BLOOD PRESSURE: 110 MMHG | BODY MASS INDEX: 21.62 KG/M2 | WEIGHT: 116 LBS | HEART RATE: 68 BPM

## 2018-01-26 VITALS
SYSTOLIC BLOOD PRESSURE: 130 MMHG | BODY MASS INDEX: 21.98 KG/M2 | WEIGHT: 116.4 LBS | DIASTOLIC BLOOD PRESSURE: 80 MMHG | HEIGHT: 61 IN

## 2018-01-26 VITALS
DIASTOLIC BLOOD PRESSURE: 84 MMHG | HEIGHT: 61 IN | SYSTOLIC BLOOD PRESSURE: 122 MMHG | HEART RATE: 68 BPM | WEIGHT: 116 LBS | BODY MASS INDEX: 21.9 KG/M2

## 2018-01-26 DIAGNOSIS — S42.295D OTHER NONDISPLACED FRACTURE OF UPPER END OF LEFT HUMERUS, SUBSEQUENT ENCOUNTER FOR FRACTURE WITH ROUTINE HEALING: ICD-10-CM

## 2018-01-26 PROBLEM — S42.202D CLOSED FRACTURE OF PROXIMAL END OF LEFT HUMERUS WITH ROUTINE HEALING: Status: ACTIVE | Noted: 2018-01-04

## 2018-01-26 PROBLEM — M89.9 DISORDER OF BONE AND CARTILAGE: Status: ACTIVE | Noted: 2018-01-26

## 2018-01-26 PROBLEM — M94.9 DISORDER OF BONE AND CARTILAGE: Status: ACTIVE | Noted: 2018-01-26

## 2018-01-26 PROBLEM — G44.209 TENSION HEADACHE: Status: ACTIVE | Noted: 2018-01-26

## 2018-01-26 RX ORDER — IBUPROFEN 200 MG
600-800 TABLET ORAL PRN
COMMUNITY
Start: 2013-06-17 | End: 2018-10-16

## 2018-01-26 RX ORDER — POLYETHYLENE GLYCOL 3350 17 G/17G
POWDER, FOR SOLUTION ORAL DAILY PRN
COMMUNITY
End: 2019-11-08

## 2018-01-26 RX ORDER — BUTALBITAL, ACETAMINOPHEN AND CAFFEINE 50; 325; 40 MG/1; MG/1; MG/1
1-2 TABLET ORAL EVERY 4 HOURS PRN
COMMUNITY
Start: 2017-10-30 | End: 2018-03-27

## 2018-01-26 RX ORDER — OXYCODONE AND ACETAMINOPHEN 5; 325 MG/1; MG/1
1-2 TABLET ORAL EVERY 4 HOURS PRN
COMMUNITY
Start: 2017-12-29 | End: 2018-03-09

## 2018-01-26 RX ORDER — CELECOXIB 200 MG/1
200 CAPSULE ORAL
COMMUNITY
Start: 2017-10-12 | End: 2018-10-16

## 2018-01-26 RX ORDER — ONDANSETRON 4 MG/1
4 TABLET, FILM COATED ORAL EVERY 8 HOURS PRN
COMMUNITY
Start: 2017-11-26 | End: 2018-03-09

## 2018-01-26 RX ORDER — PYRIDOXINE HCL (VITAMIN B6) 100 MG
TABLET ORAL DAILY
COMMUNITY
End: 2019-11-08

## 2018-01-26 RX ORDER — ZOLPIDEM TARTRATE 5 MG/1
2.5-5 TABLET ORAL
COMMUNITY
Start: 2017-10-31 | End: 2018-04-17

## 2018-01-26 RX ORDER — PHENOL 1.4 %
AEROSOL, SPRAY (ML) MUCOUS MEMBRANE 2 TIMES DAILY
COMMUNITY

## 2018-01-28 ASSESSMENT — PATIENT HEALTH QUESTIONNAIRE - PHQ9: SUM OF ALL RESPONSES TO PHQ QUESTIONS 1-9: 0

## 2018-01-28 ASSESSMENT — ANXIETY QUESTIONNAIRES: GAD7 TOTAL SCORE: 0

## 2018-01-29 ENCOUNTER — AMBULATORY - GICH (OUTPATIENT)
Dept: ORTHOPEDICS | Facility: OTHER | Age: 68
End: 2018-01-29

## 2018-01-29 DIAGNOSIS — S42.295D OTHER NONDISPLACED FRACTURE OF UPPER END OF LEFT HUMERUS, SUBSEQUENT ENCOUNTER FOR FRACTURE WITH ROUTINE HEALING: ICD-10-CM

## 2018-02-09 ENCOUNTER — HOSPITAL ENCOUNTER (OUTPATIENT)
Dept: RADIOLOGY | Facility: OTHER | Age: 68
End: 2018-02-09
Attending: ORTHOPAEDIC SURGERY

## 2018-02-09 ENCOUNTER — OFFICE VISIT - GICH (OUTPATIENT)
Dept: ORTHOPEDICS | Facility: OTHER | Age: 68
End: 2018-02-09

## 2018-02-09 ENCOUNTER — HISTORY (OUTPATIENT)
Dept: ORTHOPEDICS | Facility: OTHER | Age: 68
End: 2018-02-09

## 2018-02-09 VITALS
HEART RATE: 75 BPM | BODY MASS INDEX: 21.9 KG/M2 | SYSTOLIC BLOOD PRESSURE: 130 MMHG | HEIGHT: 61 IN | WEIGHT: 116 LBS | DIASTOLIC BLOOD PRESSURE: 74 MMHG

## 2018-02-09 VITALS
HEART RATE: 68 BPM | DIASTOLIC BLOOD PRESSURE: 76 MMHG | WEIGHT: 116 LBS | BODY MASS INDEX: 21.9 KG/M2 | HEIGHT: 61 IN | SYSTOLIC BLOOD PRESSURE: 120 MMHG

## 2018-02-09 DIAGNOSIS — S42.295D OTHER NONDISPLACED FRACTURE OF UPPER END OF LEFT HUMERUS, SUBSEQUENT ENCOUNTER FOR FRACTURE WITH ROUTINE HEALING: ICD-10-CM

## 2018-02-12 VITALS
DIASTOLIC BLOOD PRESSURE: 78 MMHG | WEIGHT: 116 LBS | SYSTOLIC BLOOD PRESSURE: 128 MMHG | BODY MASS INDEX: 21.92 KG/M2 | HEART RATE: 92 BPM

## 2018-02-13 NOTE — TELEPHONE ENCOUNTER
Patient Information     Patient Name MRN Sex DOB Hultman, Corinne G [id number] Female 1950      Telephone Encounter by Venice Perez at 2018  1:49 PM     Author:  Venice Perez Service:  (none) Author Type:  (none)     Filed:  2018  1:55 PM Encounter Date:  2018 Status:  Signed     :  Venice Perez            WLR - Patient is wondering if they should come in to be seen. Patient states that they are running a fever. Recently seen for Fx humerus.  Please call.    Venice Perez ....................  2018   1:54 PM

## 2018-02-13 NOTE — TELEPHONE ENCOUNTER
Patient Information     Patient Name MRN Sex DOB Hultman, Corinne G [id number] Female 1950      Telephone Encounter by Enid Aguirre RN at 1/10/2018 11:11 AM     Author:  Enid Aguirre RN Service:  (none) Author Type:  NURS- Registered Nurse     Filed:  1/10/2018 11:12 AM Encounter Date:  2018 Status:  Signed     :  Enid Aguirre RN (NURS- Registered Nurse)            This is a Refill request from: [facility]  Name of Medication: Tylenol #3 1-2 tabs every 6 hrs PRN  Quantity requested: 100  Last fill date: 17  Due for refill: yes  Last visit with AMAURY MOTA was on:  [place] [facility]  PCP:  Amaury Mota MD  Controlled Substance Agreement:  None signed   Diagnosis r/t this medication request: Cervical disc disease.    Medication not on RN protocol , Unable to complete prescription refill per RN Medication Refill Policy.................... ENID AGUIRRE RN ....................  1/10/2018   11:11 AM

## 2018-02-13 NOTE — NURSING NOTE
Patient Information     Patient Name MRN Sex DOB Hultman, Corinne G [id number] Female 1950      Nursing Note by Jade Warner at 2018 12:45 PM     Author:  Jade Warner Service:  (none) Author Type:  (none)     Filed:  2018 12:46 PM Encounter Date:  2018 Status:  Signed     :  Jade Warner            Patient is here for a follow up on right humerus  DOI 17  Jade Warner LPN 2018 12:45 PM

## 2018-02-13 NOTE — PROGRESS NOTES
Patient Information     Patient Name MRN Sex DOB Hultman, Corinne G [id number] Female 1950      Progress Notes by Rashawn Alexandra DO at 2018 12:45 PM     Author:  Rashawn Alexandra DO Service:  (none) Author Type:  Physician     Filed:  2018  1:15 PM Encounter Date:  2018 Status:  Signed     :  Rashawn Alexandra DO (Physician)            Corinne G Hultman was seen in consultation for the ER for a chief complaint of pain into the left shoulder.    CHIEF COMPLAINT: Corinne G Hultman is a 67 y.o.  female  Chief Complaint     Patient presents with       Follow Up      left humerus  DOI 17       HISTORY OF PRESENTING INJURY   History of presenting injury, patient was bringing her garbage cans up her driveway which is relatively steep when she slipped and fell on . She had pain into her left shoulder. She eventually went to the ER for evaluation and was noted to have a fracture. She was placed into an immobilizer. She is here today for evaluation.  Description of pain:  mild aching, discomfort and increased pain with activity   Radiation of pain: no  Pain course: gradually improving  Worse with: bending or flexing affected area  Improved by: OTC meds, Rx meds, rest and ice  History of injection: No  Any PT: No    PAST MEDICAL HISTORY:  Past Medical History:     Diagnosis  Date     Actinic keratosis 2013     Closed fracture of lateral malleolus 2010     Degeneration of cervical intervertebral disc      Disorder of bone and cartilage, unspecified      History of basal cell cancer 3/28/2017     Menopause age 50     Open fracture of lateral malleolus      Osteoporosis     on Fosamax for 17 years--stopped       Other malignant lymphomas, unspecified site, extranodal and solid organ sites 2011     Rheumatic fever 10+ years ago     Tension headache      Vertigo        PAST SURGICAL HISTORY:  Past Surgical History:      Procedure  Laterality Date     BREAST  BIOPSY  mid 1990s    benign       CERVICAL FUSION  12/2012    C6-7 on left with discectomy       COLONOSCOPY SCREENING  age 55    normal       COLONOSCOPY SCREENING  12/10/2015    lnormal--repeat 10 years       LUMBAR LAMINECTOMY  1984    L4-5       AK TX ECTOP PREG BY LAPAROSCOPE Left 1974    left tube and ovary removed       SKIN BIOPSY      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       TONSILLECTOMY  1959     TUBAL LIGATION      FOR ECTOPIC PREGNANCY         ORTHOPEDIC FRACTURES AND BROKEN BONES:  As above.    ALLERGIES:  No Known Allergies    CURRENT MEDICATIONS:  Current Outpatient Prescriptions       Medication  Sig Dispense Refill     acetaminophen-caffeine-butalbital (FIORICET) 325-40-50 mg tablet TAKE 1 TO 2 TABLETS BY MOUTH EVERY 4 TO 6 HOURS AS NEEDED FOR HEADACHE. MAX OF 6 DOSES PER DAY. 90 tablet 3     acetaminophen-codeine, 300-30 mg, (TYLENOL #3) tablet TAKE 1-2 TABLETS BY MOUTH EVERY 6 HOURS IF NEEDED. NO MORE THAN 4,000MG OF ACETAMINOPHEN DAILY 100 tablet 0     CALCIUM CARBONATE (CALCIUM 600 ORAL) Take  by mouth 2 times daily.       celecoxib (CELEBREX) 200 mg capsule Take 1 capsule by mouth once daily with a meal. 90 capsule 3     Cholecalciferol, Vitamin D3, (VITAMIN D-3) 2,000 unit tablet Take 1 tablet by mouth once daily.  0     Cranberry Extract 500 mg cap Take  by mouth once daily.       ibuprofen (ADVIL; MOTRIN) 200 mg tablet 600-800 mg prn  0     ondansetron (ZOFRAN) 4 mg tablet Take 1 tablet by mouth every 8 hours if needed for Nausea/Vomiting. 30 tablet 0     oxyCODONE-acetaminophen, 5-325 mg, (PERCOCET) 5-325 mg per tablet Take 1-2 tablets by mouth every 4 hours if needed  for Pain Max acetaminophen dose: 4000mg in 24 hrs. 30 tablet 0     POLYETHYLENE GLYCOL 3350 (MIRALAX ORAL) Take 1 Cap by mouth once daily if needed.       ranitidine (ZANTAC) 150 mg tablet Take 1 tablet by mouth 2 times daily. 30 tablet 0     zolpidem (AMBIEN) 5 mg tablet Take 0.5-1 tablets by mouth at  "bedtime if needed for Sleep. 30 tablet 5     No current facility-administered medications for this visit.      Medications have been reviewed by me and are current to the best of my knowledge and ability.      SOCIAL HISTORY:  Marital Status:   Children: Yes  Occupation: She works as a nurse at [employer].  Alcohol use:  Yes  Tobacco use: Smoker: no  Are you or have you used illicit drugs:  no    FAMILY HISTORY:  Family History       Problem   Relation Age of Onset     Heart Disease  Mother       after cardiac bypass, age 71       Heart Disease  Father      congestive heart failure        Other  Other      headaches, otherwise A&W       Cancer-breast  Other        REVIEW OF SYSTEMS:  The review of systems as documented in the HPI and on the intake questionnaire, completed by the patient on 2018 have been reviewed by myself and the pertinent positives and negatives addressed.  The remainder of the complete review of systems was non-contributory.    PHYSICAL EXAM:   /74 (Cuff Site: Right Arm, Position: Sitting, Cuff Size: Adult Regular)  Pulse 75  Ht 1.549 m (5' 1\")  Wt 52.6 kg (116 lb)  BMI 21.92 kg/m2 Body mass index is 21.92 kg/(m^2).    General Appearance: Pleasant female in good appearance, mood and affect.  Alert and orientated times three ( time, date and location).    Skin: Abnormal, slight bruising noted.    Shoulder:  Motion: Not tested due to the recent nature of her fracture.    Elbow:  Flexion: Normal  Extension: Normal    Hand:  Sensation: Normal  Radial and ulnar blood flow:  Normal    Eyes: Pupils are round.    Ears: Hearing: Intact    Heart: She has good capillary refill and her hands pulses are regular.    Lungs: Clear.     Radiographic images from 17 where independently reviewed and discussed with the patient.      Xray:     X-rays demonstrated a 3 part-comminuted fracture of the left proximal humerus satisfactory alignment.    Exam: XR SHOULDER 2 " VIEWS LEFT  History: ARM INJURY;  Technique: 2 views.  Findings: There is an acute fracture through the proximal humerus with transverse neck fracture and additional fracture through the greater tuberosity.  There is no dislocation and no other acute abnormality is seen.    Impression: Acute proximal humerus fracture.  Electronically Signed By: Holley Jensen M.D. on 12/29/2017 2:17 PM    IMPRESSION:  Left comminuted proximal humerus fracture (DOI 12/29/17).    PLAN:  Risks, benefits, conservative, surgical and alternatives to treatment where discussed and the patient would like to proceed with conservative measures.  She was instructed on how to utilize her immobilizer.  She was instructed on elbow range of motion. She is able to demonstrate this here in the office.  Work note given when she is ready she will call our office she would like to return to work with restrictions which would include not using the left arm.  Questions and concerns answered.    Rashawn Alexandra D.O.  Orthopaedic Surgeon    [facility] and [address] [address]  Phone [phone] (KNEE)  Fax [phone]    This document was created using computer generated templates and voice activated software.    1:10 PM 1/4/2018

## 2018-02-13 NOTE — TELEPHONE ENCOUNTER
"Patient Information     Patient Name MRN Sex DOB Hultman, Corinne G [id number] Female 1950      Telephone Encounter by Jose Arvizu RN at 2018  2:28 PM     Author:  Jose Arvizu RN Service:  (none) Author Type:  NURS- Registered Nurse     Filed:  2018  2:39 PM Encounter Date:  2018 Status:  Signed     :  Jose Arvizu RN (NURS- Registered Nurse)            Writer returned call to patient as requested. Triage completed as noted below, with disposition for patient to be seen within the next 4 hours for an evaluation. No openings in the clinic at this time, so writer suggested patient be seen for an assessment at [facility]. Patient hesitant at first, however writer and patient did discuss that she is at greater risk of infection due to her fracture, age, as well as that she does have some symptoms of a cold at this time. Patient is agreeable to be seen yet today in the [facility]. Will have her   her in. Care advice given as noted, to which patient states understanding. Nothing further needed per this writer at this time.    Reason for Disposition    [1] Fever > 101 F (38.3 C) AND [2] age > 60    Answer Assessment - Initial Assessment Questions  1. TEMPERATURE: \"What is the most recent temperature?\"  \"How was it measured?\"       101.4, was taken an hour ago and was taken 1 hour after taking ibuprofen 800mg. Was taken orally.  2. ONSET: \"When did the fever start?\"       Fever started this morning. Had the chills, so she took her temperature.   3. SYMPTOMS: \"Do you have any other symptoms besides the fever?\"  (e.g., colds, headache, sore throat, earache, cough, rash, diarrhea, vomiting, abdominal pain)      Cold symptoms, stuffy nose, cough (mostly at night), no other symptoms. Did fracture humerus on 17-multiple bruises all over her body from her fall.  4. CAUSE: If there are no symptoms, ask: \"What do you think is causing the fever?\"       Is unsure of what is " "causing the fever  5. CONTACTS: \"Does anyone else in the family have an infection?\"      Denies.  did have cold symptoms, but have since resolved.  6. TREATMENT: \"What have you done so far to treat this fever?\" (e.g., medications)      Ibuprofen and a percocet 5-325 mg  7. IMMUNOCOMPROMISE: \"Do you have of the following: diabetes, HIV positive, splenectomy, cancer chemotherapy, chronic steroid treatment, transplant patient, etc.\"      Denies, but did fracture her humerus so body is repairing that  8. PREGNANCY: \"Is there any chance you are pregnant?\" \"When was your last menstrual period?\"      N/A  9. TRAVEL: \"Have you traveled out of the country in the last month?\" (e.g., travel history, exposures)      Denies    Protocols used: ADULT FEVER-A-    Jose Arvizu RN ....................  1/5/2018   2:35 PM        "

## 2018-02-13 NOTE — PROGRESS NOTES
Patient Information     Patient Name MRN Sex DOB Hultman, Corinne G [id number] Female 1950      Progress Notes by Rashawn Alexandra DO at 2018 10:15 AM     Author:  Rashawn Alexandra DO Service:  (none) Author Type:  Physician     Filed:  2018 10:54 AM Encounter Date:  2018 Status:  Signed     :  Rashawn Alexandra DO (Physician)            PROGRESS NOTE    SUBJECTIVE:  Corinne G Hultman is here for evaluation in regards to her left shoulder. She's making good gains is now 6 weeks out from her proximal left humerus fracture.    OBJECTIVE:  /78 (Cuff Site: Right Arm, Position: Sitting, Cuff Size: Adult Regular)  Pulse 92  Wt 52.6 kg (116 lb)  BMI 21.92 kg/m2 Body mass index is 21.92 kg/(m^2).    General Appearance: Pleasant female in good appearance, mood and affect.  Alert and orientated times three ( time, date and location).    Skin: Intact.    Shoulder:  Motion: Motion today does show her to have 0-110 degrees of passive forward flexion soft endpoint to her capsule.  Strength: Expected weakness.    Elbow:  Flexion: Normal  Extension: Normal    Hand:  Sensation: Normal  Radial and ulnar blood flow:  Normal    Eyes: Pupils are round.    Ears: Hearing: Intact    Heart: She has good capillary refill and her hands pulses are regular.    Lungs: Clear.     Radiographic images from 17, ,  where independently reviewed and discussed with the patient.      Xray:     Today's x-rays do show appropriate alignment of the 3 part comminuted left proximal humerus fracture. There is increased callus formation.    PROCEDURE: XR HUMERUS MIN 2 VIEWS LEFT  HISTORY: Other closed nondisplaced fracture of proximal end of left humerus with routine healing, subsequent encounter.  COMPARISON: 2017  TECHNIQUE: 2 views of the left humerus were obtained.  FINDINGS: Comminuted fracture of the proximal humerus with vertical component through the greater tuberosity and  horizontal component through the humeral neck is unchanged in position. There has been some interval healing at the fracture site, but the fracture line is still visible. No new fracture seen.  IMPRESSION: Healing proximal humerus fracture.  Electronically Signed By: Cece Cox M.D. on 1/26/2018 10:34 AM    Exam: XR SHOULDER 2 VIEWS LEFT  History: ARM INJURY;  Technique: 2 views.  Findings: There is an acute fracture through the proximal humerus with transverse neck fracture and additional fracture through the greater tuberosity.  There is no dislocation and no other acute abnormality is seen.    Impression: Acute proximal humerus fracture.  Electronically Signed By: Holley Jensen M.D. on 12/29/2017 2:17 PM    IMPRESSION:  Left comminuted proximal humerus fracture (DOI 12/29/17).    PLAN:  Risks, benefits, conservative, surgical and alternatives to treatment where discussed and the patient would like to proceed with conservative measures.  She was instructed on how to perform her wall walking exercises a handout was given in regards to this and I had her perform the exercises.  She was instructed on elbow range of motion. She was able to demonstrate this here in the office.  She may discard the white immobilizer in 2 weeks and increase all of her activities.  Questions and concerns answered.  Follow-up in 4 weeks with x-ray first to view only AP and lateral proximal humerus.    Rashawn Alexandra D.O.  Orthopaedic Surgeon    [facility]  [address] [place] [address]  Phone [phone] (KNEE)  Fax [phone]    This document was created using computer generated templates and voice activated software.    10:49 AM 2/9/2018

## 2018-02-13 NOTE — NURSING NOTE
Patient Information     Patient Name MRN Sex DOB Hultman, Corinne G [id number] Female 1950      Nursing Note by Alesha Lo at 2018 10:15 AM     Author:  Alesha Lo Service:  (none) Author Type:  (none)     Filed:  2018 10:23 AM Encounter Date:  2018 Status:  Signed     :  Alesha Lo            Patient is here for a follow up on her left humerus.  DOI: 17  Alesha Lo LPN .......2018 10:23 AM

## 2018-02-13 NOTE — PROGRESS NOTES
"Patient Information     Patient Name MRN Sex DOB Hultman, Corinne G [id number] Female 1950      Progress Notes by Rashawn Alexandra DO at 2018  8:30 AM     Author:  Rashawn Alexandra DO Service:  (none) Author Type:  Physician     Filed:  2018  8:51 AM Encounter Date:  2018 Status:  Signed     :  Rashawn Alexandra DO (Physician)            PROGRESS NOTE    SUBJECTIVE:  Corinne G Hultman is here for evaluation in regards to her left humeral fracture. Patient states she is doing well at 4 weeks now she has less discomfort. She did very careful and has been wearing her immobilizer.    OBJECTIVE:  /76 (Cuff Site: Right Arm, Position: Sitting, Cuff Size: Adult Regular)  Pulse 68  Ht 1.549 m (5' 1\")  Wt 52.6 kg (116 lb)  BMI 21.92 kg/m2 Body mass index is 21.92 kg/(m^2).    General Appearance: Pleasant female in good appearance, mood and affect.  Alert and orientated times three ( time, date and location).    Skin: Abnormal, bruising is resolving.    Shoulder:  Motion: Not tested due to the recent nature of her fracture.    Elbow:  Flexion: Normal  Extension: Normal    Hand:  Sensation: Normal  Radial and ulnar blood flow:  Normal    Eyes: Pupils are round.    Ears: Hearing: Intact    Heart: She has good capillary refill and her hands pulses are regular.    Lungs: Clear.     Radiographic images from 17,  where independently reviewed and discussed with the patient.      Xray:     Today's x-rays do show appropriate alignment of the 3 part comminuted left proximal humerus fracture. There is very minimal callus formation.    Exam: XR SHOULDER 2 VIEWS LEFT  History: ARM INJURY;  Technique: 2 views.  Findings: There is an acute fracture through the proximal humerus with transverse neck fracture and additional fracture through the greater tuberosity.  There is no dislocation and no other acute abnormality is seen.    Impression: Acute proximal humerus " fracture.  Electronically Signed By: Holley Jensen M.D. on 12/29/2017 2:17 PM    IMPRESSION:  Left comminuted proximal humerus fracture (DOI 12/29/17).    PLAN:  Risks, benefits, conservative, surgical and alternatives to treatment where discussed and the patient would like to proceed with conservative measures.  She was instructed on how to perform Codman's exercises a handout was given in regards to this and I had her perform the exercises.  She was instructed on elbow range of motion. She was able to demonstrate this here in the office.  Work note given when she is ready she will call our office she would like to return to work with restrictions which would include not using the left arm.  Questions and concerns answered.  Follow-up in 2 weeks with x-ray first to view only AP and lateral proximal humerus.    Rashawn Alexandra D.O.  Orthopaedic Surgeon    [facility]  [address] [place] [address]  Phone [phone] (KNEE)  Fax [phone]    This document was created using computer generated templates and voice activated software.    8:48 AM 1/26/2018

## 2018-02-13 NOTE — NURSING NOTE
Patient Information     Patient Name MRN Sex DOB Hultman, Corinne G [id number] Female 1950      Nursing Note by Alesha Lo at 2018  8:30 AM     Author:  Alesha Lo Service:  (none) Author Type:  (none)     Filed:  2018  8:16 AM Encounter Date:  2018 Status:  Signed     :  Alesha Lo            Patient is here for a follow up on her left humerus.  DOI: 17  Alesha Lo LPN .......2018 8:15 AM

## 2018-03-01 DIAGNOSIS — S42.202D CLOSED FRACTURE OF PROXIMAL END OF LEFT HUMERUS WITH ROUTINE HEALING, UNSPECIFIED FRACTURE MORPHOLOGY, SUBSEQUENT ENCOUNTER: Primary | ICD-10-CM

## 2018-03-09 ENCOUNTER — OFFICE VISIT (OUTPATIENT)
Dept: ORTHOPEDICS | Facility: OTHER | Age: 68
End: 2018-03-09
Attending: FAMILY MEDICINE
Payer: MEDICARE

## 2018-03-09 ENCOUNTER — HOSPITAL ENCOUNTER (OUTPATIENT)
Dept: GENERAL RADIOLOGY | Facility: OTHER | Age: 68
Discharge: HOME | End: 2018-03-09
Attending: ORTHOPAEDIC SURGERY | Admitting: ORTHOPAEDIC SURGERY
Payer: MEDICARE

## 2018-03-09 VITALS
WEIGHT: 114 LBS | DIASTOLIC BLOOD PRESSURE: 72 MMHG | HEIGHT: 62 IN | HEART RATE: 88 BPM | SYSTOLIC BLOOD PRESSURE: 112 MMHG | BODY MASS INDEX: 20.98 KG/M2

## 2018-03-09 DIAGNOSIS — S42.202D CLOSED FRACTURE OF PROXIMAL END OF LEFT HUMERUS WITH ROUTINE HEALING, UNSPECIFIED FRACTURE MORPHOLOGY, SUBSEQUENT ENCOUNTER: ICD-10-CM

## 2018-03-09 DIAGNOSIS — S42.202D CLOSED FRACTURE OF PROXIMAL END OF LEFT HUMERUS WITH ROUTINE HEALING, UNSPECIFIED FRACTURE MORPHOLOGY, SUBSEQUENT ENCOUNTER: Primary | ICD-10-CM

## 2018-03-09 PROCEDURE — 99207 ZZC FRACTURE CARE IN GLOBAL PERIOD: CPT | Performed by: ORTHOPAEDIC SURGERY

## 2018-03-09 PROCEDURE — G0463 HOSPITAL OUTPT CLINIC VISIT: HCPCS

## 2018-03-09 PROCEDURE — 73060 X-RAY EXAM OF HUMERUS: CPT | Mod: LT

## 2018-03-09 ASSESSMENT — PAIN SCALES - GENERAL: PAINLEVEL: NO PAIN (0)

## 2018-03-09 NOTE — MR AVS SNAPSHOT
"              After Visit Summary   3/9/2018    Corinne G Hultman    MRN: [id number]           Patient Information     Date Of Birth          1950        Visit Information        Provider Department      3/9/2018 10:15 AM Rashawn Alexandra DO [facility]        Today's Diagnoses     Closed fracture of proximal end of left humerus with routine healing, unspecified fracture morphology, subsequent encounter    -  1       Follow-ups after your visit        Follow-up notes from your care team     Return in about 6 weeks (around 4/20/2018).      Your next 10 appointments already scheduled     Apr 20, 2018  8:15 AM CDT   Return Visit with Rashawn Alexandra DO   [facility] ([facility])    [address]  [place] [address] [phone]              Who to contact     If you have questions or need follow up information about today's clinic visit or your schedule please contact [facility] directly at [phone].  Normal or non-critical lab and imaging results will be communicated to you by Donordonuthart, letter or phone within 4 business days after the clinic has received the results. If you do not hear from us within 7 days, please contact the clinic through LatamLeapt or phone. If you have a critical or abnormal lab result, we will notify you by phone as soon as possible.  Submit refill requests through Evirx or call your pharmacy and they will forward the refill request to us. Please allow 3 business days for your refill to be completed.          Additional Information About Your Visit        Donordonuthart Information     Evirx lets you send messages to your doctor, view your test results, renew your prescriptions, schedule appointments and more. To sign up, go to [email or web address] . Click on \"Log in\" on the left side of the screen, which will take you to the Welcome page. Then click on \"Sign up Now\" on the right side of " "the page.     You will be asked to enter the access code listed below, as well as some personal information. Please follow the directions to create your username and password.     Your access code is: [email or web address]  Expires: 2018 10:57 AM     Your access code will  in 90 days. If you need help or a new code, please call your [place] clinic or [phone].        Care EveryWhere ID     This is your Care EveryWhere ID. This could be used by other organizations to access your [place] medical records  [id number]        Your Vitals Were     Pulse Height BMI (Body Mass Index)             88 1.568 m (5' 1.75\") 21.02 kg/m2          Blood Pressure from Last 3 Encounters:   18 112/72   18 128/78   18 120/76    Weight from Last 3 Encounters:   18 51.7 kg (114 lb)   18 52.6 kg (116 lb)   18 52.6 kg (116 lb)              Today, you had the following     No orders found for display       Primary Care Provider Office Phone # Fax #    Amaury Guevara [phone]       [address] [facility] [address]        Equal Access to Services     [facility] Center[facility] AH: Hadii jennifer gutierrezo Soana luisaali, waaxda luqadaha, qaybta kaalmada adeegyada, inge iverson. So [facility] [phone].    ATENCIÓN: Si habla español, tiene a acosta disposición servicios gratuitos de asistencia lingüística. Llame al [phone].    We comply with applicable federal civil rights laws and [place] laws. We do not discriminate on the basis of race, color, national origin, age, disability, sex, sexual orientation, or gender identity.            Thank you!     Thank you for choosing [facility]  for your care. Our goal is always to provide you with excellent care. Hearing back from our patients is one way we can continue to improve our services. Please take a few minutes to complete the written survey that you may receive in the mail after your visit " with us. Thank you!             Your Updated Medication List - Protect others around you: Learn how to safely use, store and throw away your medicines at www.disposemymeds.org.          This list is accurate as of 3/9/18 10:57 AM.  Always use your most recent med list.                   Brand Name Dispense Instructions for use Diagnosis    acetaminophen-codeine 300-30 MG per tablet    TYLENOL #3    100 tablet    TAKE 1-2 TABLETS BY MOUTH EVERY 6 HOURS AS NEEDED FOR PAIN    Tension-type headache, not intractable, unspecified chronicity pattern       butalbital-acetaminophen-caffeine -40 MG per tablet    FIORICET/ESGIC     Take 1-2 tablets by mouth every 4 hours as needed for migraine Max of 6 doses per day.        calcium carbonate 600 MG tablet   Generic drug:  calcium carbonate      Take by mouth 2 times daily        celecoxib 200 MG capsule    celeBREX     Take 200 mg by mouth daily with food        Cranberry 500 MG Caps      Take by mouth daily        D 2000 2000 UNITS tablet   Generic drug:  cholecalciferol      Take 2,000 Units by mouth daily        ibuprofen 200 MG tablet    ADVIL/MOTRIN     Take 600-800 mg by mouth as needed        polyethylene glycol Packet    MIRALAX/GLYCOLAX     Take by mouth daily as needed Take 1 cap by mouth daily if needed.        zolpidem 5 MG tablet    AMBIEN     Take 2.5-5 mg by mouth nightly as needed for sleep

## 2018-03-09 NOTE — PROGRESS NOTES
"PROGRESS NOTE    SUBJECTIVE:  Corinne G Hultman is here for evaluation in regards to her left humeral-proximal humerus fracture.  She is now 10 weeks out from that injury.  She is doing well and has been working with her therapy at home.    OBJECTIVE:  /72 (BP Location: Right arm, Patient Position: Sitting, Cuff Size: Adult Regular)  Pulse 88  Ht 1.568 m (5' 1.75\")  Wt 51.7 kg (114 lb)  BMI 21.02 kg/m2 Body mass index is 21.02 kg/(m^2).    General Appearance: Pleasant 68 year old female in good appearance, mood and affect.  Alert and orientated times three ( time, date and location).    Skin: Intact.    Shoulder:  Motion: Motion today does show her to have 0-165 degrees of passive forward flexion soft endpoint to her capsule, actively she goes 160 .  Strength: Expected weakness.    Elbow:  Flexion: Normal  Extension: Normal    Hand:  Sensation: Normal  Radial and ulnar blood flow:  Normal    Eyes: Pupils are round.    Ears: Hearing: Intact    Heart: She has good capillary refill and her hands pulses are regular.    Lungs: Clear.     Radiographic images from 12/29/17, 1/26, 2/9, 3/9 where independently reviewed and discussed with the patient.      Xray:     There is been interval healing of the fracture since her last visit with satisfactory alignment.  It is a proximal 3 part proximal humerus fracture.    XR HUMERUS LT G/E 2 VW    HISTORY: 68 yearsFemale ; Closed fracture of proximal end of left  humerus with routine healing, unspecified fracture morphology,  subsequent encounter    TECHNIQUE: 2 views left humerus    COMPARISON: 2/9/2018    FINDINGS: A mildly comminuted left proximal humeral fracture is again  seen. There is a transverse component through the surgical neck and a  nondisplaced component of the greater tuberosity. Lucent fracture  lines are less distinct. Alignment is unchanged.    IMPRESSION: Healing proximal left humeral fracture. Alignment is  unchanged.    GIOVANA CARLIN, " MD    PROCEDURE: XR HUMERUS MIN 2 VIEWS LEFT  HISTORY: Other closed nondisplaced fracture of proximal end of left humerus with routine healing, subsequent encounter.  COMPARISON: 12/29/2017  TECHNIQUE: 2 views of the left humerus were obtained.  FINDINGS: Comminuted fracture of the proximal humerus with vertical component through the greater tuberosity and horizontal component through the humeral neck is unchanged in position. There has been some interval healing at the fracture site, but the fracture line is still visible. No new fracture seen.  IMPRESSION: Healing proximal humerus fracture.  Electronically Signed By: Cece Cox M.D. on 1/26/2018 10:34 AM    Exam: XR SHOULDER 2 VIEWS LEFT  History: ARM INJURY;  Technique: 2 views.  Findings: There is an acute fracture through the proximal humerus with transverse neck fracture and additional fracture through the greater tuberosity.  There is no dislocation and no other acute abnormality is seen.    Impression: Acute proximal humerus fracture.  Electronically Signed By: Holley Jensen M.D. on 12/29/2017 2:17 PM    IMPRESSION:  Left comminuted proximal humerus fracture (DOI 12/29/17).    PLAN:  Risks, benefits, conservative, surgical and alternatives to treatment where discussed and the patient would like to proceed with conservative measures.  She was instructed on how to perform her wall walking exercises a handout was given in regards to this and I had her perform the exercises once again she is doing a good job she was given some new pointers.  She is to try to do most things with her upper extremities as long as she is facing the activity  Questions and concerns answered.  Follow-up in 6 weeks with x-ray first to view only AP and lateral proximal humerus.      Rashawn Alexandra D.O.  Orthopaedic Surgeon    [facility]  [address] [place] [address]  Phone [phone] (KNEE)  Fax [phone]    Disclaimer:  This  note consists of words and symbols derived from keyboarding, dictation, or using voice recognition software. As a result, there may be errors in the script that have gone undetected. Please consider this when interpreting information found in this note.    10:52 AM 3/9/2018

## 2018-03-09 NOTE — NURSING NOTE
Patient is here for her follow up on her left humerus.  DOI: 12/29/17  Alesha Lo LPN .......3/9/2018 10:06 AM

## 2018-03-27 DIAGNOSIS — G44.209 TENSION HEADACHE: Primary | ICD-10-CM

## 2018-04-02 NOTE — TELEPHONE ENCOUNTER
This is a Refill request from: mahesh  Name of Medication: butalbital-acetaminophen-caffeine (FIORICET/ESGIC) -40 MG per tablet  TAKE 1 TO 2 TABLETS BY MOUTH EVERY 4 TO 6 HOURS AS NEEDED FOR HEADACHE. MAX OF 6 DOSES PER DAY.  Quantity requested: 90  Last fill date: 2/13/18  Due for refill: yes  PCP:  Amaury Guevara 11/30/17  Controlled Substance Agreement:  na   Diagnosis r/t this medication request: headache     Unable to completeprescription refill per RN Medication Refill Policy.................... Enid Ling.................  4/2/2018   10:14 AM

## 2018-04-03 RX ORDER — BUTALBITAL, ACETAMINOPHEN AND CAFFEINE 50; 325; 40 MG/1; MG/1; MG/1
TABLET ORAL
Qty: 90 TABLET | Refills: 3 | Status: SHIPPED | OUTPATIENT
Start: 2018-04-03 | End: 2018-10-16

## 2018-04-16 DIAGNOSIS — S42.202D CLOSED FRACTURE OF PROXIMAL END OF LEFT HUMERUS WITH ROUTINE HEALING, UNSPECIFIED FRACTURE MORPHOLOGY, SUBSEQUENT ENCOUNTER: Primary | ICD-10-CM

## 2018-04-17 DIAGNOSIS — G47.00 INSOMNIA: ICD-10-CM

## 2018-04-17 DIAGNOSIS — F51.01 PRIMARY INSOMNIA: Primary | ICD-10-CM

## 2018-04-20 RX ORDER — ZOLPIDEM TARTRATE 5 MG/1
TABLET ORAL
Qty: 30 TABLET | Refills: 5 | Status: SHIPPED | OUTPATIENT
Start: 2018-04-20 | End: 2018-10-16

## 2018-04-20 NOTE — TELEPHONE ENCOUNTER
This is a Refill request from: mahesh  Name of Medication: zolpidem (AMBIEN) 5 MG tablet  TAKE 1/2-1 TABLET BY MOUTH EVERY DAY AT BEDTIME  Quantity requested: 30  Last fill date: 3/19/18  Due for refill: yes  PCP:  Amaury Guevara 11/30/18  Controlled Substance Agreement:     Diagnosis r/t this medication request: insomnia     Unable to completeprescription refill per RN Medication Refill Policy.................... Enid Aguirre ....................  4/20/2018   12:15 PM

## 2018-04-27 ENCOUNTER — HOSPITAL ENCOUNTER (OUTPATIENT)
Dept: GENERAL RADIOLOGY | Facility: OTHER | Age: 68
Discharge: HOME | End: 2018-04-27
Attending: ORTHOPAEDIC SURGERY | Admitting: ORTHOPAEDIC SURGERY
Payer: MEDICARE

## 2018-04-27 ENCOUNTER — OFFICE VISIT (OUTPATIENT)
Dept: ORTHOPEDICS | Facility: OTHER | Age: 68
End: 2018-04-27
Attending: ORTHOPAEDIC SURGERY
Payer: MEDICARE

## 2018-04-27 ENCOUNTER — OFFICE VISIT (OUTPATIENT)
Dept: SURGERY | Facility: OTHER | Age: 68
End: 2018-04-27
Attending: SURGERY
Payer: MEDICARE

## 2018-04-27 VITALS
BODY MASS INDEX: 21.02 KG/M2 | DIASTOLIC BLOOD PRESSURE: 72 MMHG | WEIGHT: 114 LBS | SYSTOLIC BLOOD PRESSURE: 110 MMHG | HEART RATE: 72 BPM

## 2018-04-27 VITALS
WEIGHT: 116.2 LBS | HEIGHT: 62 IN | SYSTOLIC BLOOD PRESSURE: 114 MMHG | BODY MASS INDEX: 21.38 KG/M2 | DIASTOLIC BLOOD PRESSURE: 62 MMHG

## 2018-04-27 DIAGNOSIS — S42.202D CLOSED FRACTURE OF PROXIMAL END OF LEFT HUMERUS WITH ROUTINE HEALING, UNSPECIFIED FRACTURE MORPHOLOGY, SUBSEQUENT ENCOUNTER: ICD-10-CM

## 2018-04-27 DIAGNOSIS — S42.202D CLOSED FRACTURE OF PROXIMAL END OF LEFT HUMERUS WITH ROUTINE HEALING, UNSPECIFIED FRACTURE MORPHOLOGY, SUBSEQUENT ENCOUNTER: Primary | ICD-10-CM

## 2018-04-27 DIAGNOSIS — L98.9 SKIN LESION OF SCALP: Primary | ICD-10-CM

## 2018-04-27 DIAGNOSIS — L98.9 SKIN LESION OF CHEST WALL: ICD-10-CM

## 2018-04-27 PROCEDURE — 88305 TISSUE EXAM BY PATHOLOGIST: CPT

## 2018-04-27 PROCEDURE — 17000 DESTRUCT PREMALG LESION: CPT | Performed by: SURGERY

## 2018-04-27 PROCEDURE — G0463 HOSPITAL OUTPT CLINIC VISIT: HCPCS | Mod: 25

## 2018-04-27 PROCEDURE — 73060 X-RAY EXAM OF HUMERUS: CPT | Mod: LT

## 2018-04-27 PROCEDURE — 11622 EXC S/N/H/F/G MAL+MRG 1.1-2: CPT | Mod: XU | Performed by: SURGERY

## 2018-04-27 PROCEDURE — 99213 OFFICE O/P EST LOW 20 MIN: CPT | Mod: 25 | Performed by: ORTHOPAEDIC SURGERY

## 2018-04-27 PROCEDURE — G0463 HOSPITAL OUTPT CLINIC VISIT: HCPCS

## 2018-04-27 ASSESSMENT — PAIN SCALES - GENERAL
PAINLEVEL: NO PAIN (0)
PAINLEVEL: NO PAIN (0)

## 2018-04-27 NOTE — PATIENT INSTRUCTIONS
Your incision was closed with stitches that will disolve. It is ok to remove the dressing and get the incision wet in the shower on the day after your procedure.  Don't soak in a tub, pool or lakefor 5 days. If you have concerns, please call.   What to Expect Following Cryosurgery (Liquid Nitrogen)   What isCryosurgery?   Cryosurgery is a technique for removing skin lesions that primarily involve the surface of the skin, such as warts, seborrheic keratosis, or actinic keratosis. It is a quick method of removing the lesion with minimal scarring.   The liquid nitrogen needs karen applied long enough to freeze the affected skin. By freezing the skin, a blister is created underneath the lesion. Ideally, as the new skin forms underneath the blister, the abnormal skin on the roof of the blister peelsoff. Occasionally, if the lesion is very thick (such as a large wart), only the surface is blistered off. The base or residual lesion may need to be frozen at another visit.   It takes about one to two weeks for the scabto fall off, which is when the new layer of skin has formed under the blister. Areas of thinner skin, such as the face, may heal a little faster.      What to Expect Over the Next Few Weeks     During Treatment - Area being treated will sting, burn, and then possibly itch.     Immediately After Treatment - Area will be red, sore, and swollen.     Next Day - Blister or blood blister has formed, tendernessstarts to subside. Apply a Band-Aid if necessary.     7 Days - Surface is dark red/brown and scab-like. You can apply an antibacterial ointment, such as Polysporin , but you don't have to.     2 to 4 Weeks - The surface starts to peel off. This may be encouraged gently during bathing, when the scab is softened.     No makeup should be applied until area is fully healed.      How to Take Care of the Skin after Cryosurgery   A Band-Aid can be used for larger blisters or blisters in areas that are more likely to  betraumatized -such as fingers and toes. If the area becomes dry or crusted, an ointment (Vaseline , Bacitracin , or Polysporin ) can also be applied.   Cleanse area with a mild cleanser and cool water.   Patthe area dry with a lint-free cloth.   Avoid glycolic acids, Vitamin C, scrubs, Tretinoin (Retin-A), and Retinol creams for 7 to 10 days.  The area may get wet while bathing, but swimming or hot tub use should beavoided for one week following a treatment or while the skin is open.   Within 24 hours, you can expect the area to be swollen and/or blistered. The blister may not be visible to the naked eye.   Within one week, theswelling goes down. The top becomes dark red and scab-like. The scab will loosen over the next weeks, and should fall off within one month.      Adverse Effects   The most common adverse effects are pain,swelling/blistering, potential for infection, and discoloration of the skin after it heals.     Blisters  Anytime a blister surfaces, whether from ill-fitting shoes, an oven burn, or liquid nitrogencryosurgery, it will be a bit painful. For most patients, the pain is a temporary sting with some discomfort periodically over the next day as the blister forms.   The goal is to achieve a blister. This means, mostcommonly, patients will have a blister form following treatment. Sometimes, the blister is so thin that it can't be seen and may have minimal swelling. Occasionally, a blood blister forms that can be quite dramatic but isharmless.   Rarely, the blister may become infected. When this happens, the blister becomes unusually tender, the fluid becomes cloudy, and the redness around it becomes more extensive (and may even form streaks). If this happens, contact our office.   Some lesions, especially those on the face, may leave a slight palediscoloration.   True scarring, involving deeper layers of the skin is unlikely.     At your visit today, we discussed your risk for falls and preventive  options.    Fall Prevention  Falls often occur due to slipping, tripping or losing your balance. Millions of people fall every year and injure themselves. Here are ways to reduce your risk of falling again.    Think about your fall, was there anything that caused your fall that can be fixed, removed, or replaced?    Make your home safe by keeping walkways clear of objects you may trip over.    Use non-slip pads under rugs. Do not use area rugs or small throw rugs.    Use non-slip mats in bathtubs and showers.    Install handrails and lights on staircases.    Do not walk in poorly lit areas.    Do not stand on chairs or wobbly ladders.    Use caution when reaching overhead or looking upward. This position can cause a loss of balance.    Be sure your shoes fit properly, have non-slip bottoms and are in good condition.     Wear shoes both inside and out. Avoid going barefoot or wearing slippers.    Be cautious when going up and down stairs, curbs, and when walking on uneven sidewalks.    If your balance is poor, consider using a cane or walker.    If your fall was related to alcohol use, stop or limit alcohol intake.     If your fall was related to use of sleeping medicines, talk to your doctor about this. You may need to reduce your dosage at bedtime if you awaken during the night to go to the bathroom.      To reduce the need for nighttime bathroom trips:  ? Avoid drinking fluids for several hours before going to bed  ? Empty your bladder before going to bed  ? Men can keep a urinal at the bedside    Stay as active as you can. Balance, flexibility, strength, and endurance all come from exercise. They all play a role in preventing falls. Ask your healthcare provider which types of activity are right for you.    Get your vision checked on a regular basis.    If you have pets, know where they are before you stand up or walk so you don't trip over them.    Use night lights.  Date Last Reviewed: 11/5/2015 2000-2017 [employer]  [employer]. [address] [place] [address], [place] [address]. All rights reserved. This information is not intended as a substitute for professional medical care. Always follow your healthcare professional's instructions.

## 2018-04-27 NOTE — PROGRESS NOTES
SUBJECTIVE:   68 year old female presents for lesion removal from scalp. This lesion has been present for years but is growing. There is also a lesion on her chest wall that is pink and rough. It has peeled off but won't heal..   OBJECTIVE:   Chest wall: 2 mm pink, rough lesion consistent with AK  Scalp: 1.5 x 0.4 cm area of keratosis and horn formation left occipital scalp     ASSESSMENT:   Lesion size: see above   Defect size: scalp 1.8 x 0.6 x 0.3 cm lesion and margin     PROCEDURE:   The pathophysiology of skin lesions and skin cancer was discussed with the patient. The risks, benefits and alternatives to excision of the lesion scalp were discussed with the patient, including the risks of infection, scarring, bruising, bleeding and the possible need for further procedures.   We discussed cryotherapy of the lesion on the chest wall. We specifically discussed the risks of infection, discoloration and the possible need for further treatments. The patient expressed understanding and the patient wishes to proceed. Informed consent paperwork was completed.   Procedure:   The area of the skin lesion on the chest wall was treated with liquid nitrogen for 2 freeze thaw cycles. The patient tolerated the procedure with no immediately apparent complications.   Chloraprep was used to cleanse the skin in the area of the lesion on the scalp. 1% Lidocaine with epinephrine was infiltrated in the skin and subcutaneous tissue in the area of the lesion. When appropriate anesthesia had been achieved, the lesion was sharply excised with a margin of grossly normal tissue. The skin edges were reapproximated using 4-0 Vicryl. Sterile dressing was applied. The specimen was labelled and sent to pathology for evaluation. The procedure was well tolerated without complications. Patient was given post procedure instructions and denied further questions. We will call the patient with pathology results.   Ashia Sutton MD

## 2018-04-27 NOTE — PROGRESS NOTES
PROGRESS NOTE    SUBJECTIVE:  Corinne G Hultman is here for evaluation in regards to her left shoulder.  She is now 17 weeks out from her fracture about the proximal humerus on the left.  She has been making better gains she states she has not been able to work on her motions as much because she had a long road trip vacation.  But overall she is very limited discomfort she is able to reach behind her back into more of her activities.  Overall she feels like she is making good progress.  Very minimal dull achy pain and she is not using any medications for.    OBJECTIVE:  /72 (BP Location: Right arm, Patient Position: Sitting, Cuff Size: Adult Regular)  Pulse 72  Wt 51.7 kg (114 lb)  BMI 21.02 kg/m2 Body mass index is 21.02 kg/(m^2).    General Appearance: Pleasant 68 year old female in good appearance, mood and affect.  Alert and orientated times three ( time, date and location).    Skin: Intact there is no breakdown noted.  No erythema or ecchymosis.    Shoulder:  Motion: Motion today does show her to have 0- 170   of passive forward flexion soft endpoint to her capsule, actively she goes 165 .  Much improved abduction as well as internal and external rotation.  She can reach to the small of her back.  Strength: Expected weakness- noted improvement since last visit.    Elbow:  Flexion: Normal  Extension: Normal    Hand:  Sensation: Normal  Radial and ulnar blood flow:  Normal    Eyes: Pupils are round and she wears glasses.    Ears: Hearing: Intact    Heart: She has good capillary refill and her hands pulses are regular.    Lungs: Clear.     Radiographic images from 12/29/17, 1/26, 2/9, 3/9, 4/27 where independently reviewed and discussed with the patient.      Xray:     There is now been more consolidation of the proximal humeral fracture.  It is healed up nicely with good alignment.  No changes in that alignment.    XR HUMERUS LT G/E 2 VW    HISTORY: 68 yearsFemale ; Closed fracture of proximal end of  left  humerus with routine healing, unspecified fracture morphology,  subsequent encounter    TECHNIQUE: 2 views left humerus    COMPARISON: 2/9/2018    FINDINGS: A mildly comminuted left proximal humeral fracture is again  seen. There is a transverse component through the surgical neck and a  nondisplaced component of the greater tuberosity. Lucent fracture  lines are less distinct. Alignment is unchanged.    IMPRESSION: Healing proximal left humeral fracture. Alignment is  unchanged.    GIOVANA CARLIN MD    PROCEDURE: XR HUMERUS MIN 2 VIEWS LEFT  HISTORY: Other closed nondisplaced fracture of proximal end of left humerus with routine healing, subsequent encounter.  COMPARISON: 12/29/2017  TECHNIQUE: 2 views of the left humerus were obtained.  FINDINGS: Comminuted fracture of the proximal humerus with vertical component through the greater tuberosity and horizontal component through the humeral neck is unchanged in position. There has been some interval healing at the fracture site, but the fracture line is still visible. No new fracture seen.  IMPRESSION: Healing proximal humerus fracture.  Electronically Signed By: Cece Cox M.D. on 1/26/2018 10:34 AM    Exam: XR SHOULDER 2 VIEWS LEFT  History: ARM INJURY;  Technique: 2 views.  Findings: There is an acute fracture through the proximal humerus with transverse neck fracture and additional fracture through the greater tuberosity.  There is no dislocation and no other acute abnormality is seen.    Impression: Acute proximal humerus fracture.  Electronically Signed By: Holley Jensen M.D. on 12/29/2017 2:17 PM    IMPRESSION:  Left comminuted proximal humerus fracture (DOI 12/29/17).  Very mild left adhesive capsulitis improving.    PLAN:  Risks, benefits, conservative, surgical and alternatives to treatment where discussed and the patient would like to proceed with conservative measures.  She understands to continue to work on her motions but she has no  more restrictions.  Questions and concerns answered.  Follow-up as needed.    Rashawn Alexandra D.O.  Orthopaedic Surgeon    [facility]  [address] [place] [address]  Phone [phone] (KNEE)  Fax [phone]    Disclaimer:  This note consists of words and symbols derived from keyboarding, dictation, or using voice recognition software. As a result, there may be errors in the script that have gone undetected. Please consider this when interpreting information found in this note.    8:42 AM 4/27/2018

## 2018-04-27 NOTE — NURSING NOTE
Patient presents to clinic to have a lesion removed from her head.  Jackie Hilliard LPN  4/27/2018  8:42 AM      Consent form signed.    TIMEOUT  Universal Protocol    A. Pre-procedure verification complete yes  1-relevant information / documentation available, reviewed and properly matched to the patient; 2-consent accurate and complete, 3-equipment and supplies available    B. Site marking complete na  Site marked if not in continuous attendance with patient    C. TIME OUT completed yes  Time Out was conducted just prior to starting procedure to verify the eight required elements: 1-patient identity, 2-consent accurate and complete, 3-position, 4-correct side/site marked (if applicable), 5-procedure, 6-relevant images / results properly labeled and displayed (if applicable), 7-antibiotics / irrigation fluids (if applicable), 8-safety precautions.

## 2018-04-27 NOTE — MR AVS SNAPSHOT
"              After Visit Summary   4/27/2018    Corinne G Hultman    MRN: [id number]           Patient Information     Date Of Birth          1950        Visit Information        Provider Department      4/27/2018 8:15 AM Rashawn Alexandra,  [facility]        Today's Diagnoses     Closed fracture of proximal end of left humerus with routine healing, unspecified fracture morphology, subsequent encounter    -  1       Follow-ups after your visit        Follow-up notes from your care team     Return if symptoms worsen or fail to improve.      Your next 10 appointments already scheduled     Apr 27, 2018  8:50 AM CDT   PROCEDURE with Ashia Dill MD,   SURGERY   [facility] ([facility])    [address]  [place] [address] [phone]              Who to contact     If you have questions or need follow up information about today's clinic visit or your schedule please contact [facility] directly at [phone].  Normal or non-critical lab and imaging results will be communicated to you by DermApprovedhart, letter or phone within 4 business days after the clinic has received the results. If you do not hear from us within 7 days, please contact the clinic through [email or web address] or phone. If you have a critical or abnormal lab result, we will notify you by phone as soon as possible.  Submit refill requests through Violet Grey or call your pharmacy and they will forward the refill request to us. Please allow 3 business days for your refill to be completed.          Additional Information About Your Visit        DermApprovedharRoundrate Information     Violet Grey lets you send messages to your doctor, view your test results, renew your prescriptions, schedule appointments and more. To sign up, go to [email or web address] . Click on \"Log in\" on the left side of the screen, which will take you to the Welcome page. Then click on \"Sign up Now\" on the " right side of the page.     You will be asked to enter the access code listed below, as well as some personal information. Please follow the directions to create your username and password.     Your access code is: [email or web address]  Expires: 2018 11:57 AM     Your access code will  in 90 days. If you need help or a new code, please call your [place] clinic or [phone].        Care EveryWhere ID     This is your Care EveryWhere ID. This could be used by other organizations to access your [place] medical records  [id number]        Your Vitals Were     Pulse BMI (Body Mass Index)                72 21.02 kg/m2           Blood Pressure from Last 3 Encounters:   18 110/72   18 112/72   18 128/78    Weight from Last 3 Encounters:   18 51.7 kg (114 lb)   18 51.7 kg (114 lb)   18 52.6 kg (116 lb)              Today, you had the following     No orders found for display       Primary Care Provider Office Phone # Fax #    Amaury Guevara [phone]       [address] [facility] [address]        Equal Access to Services     PALLAVI MÁRQUEZ : Hadii jennifer yun hadbrento Soana luisaali, waaxda luqadaha, qaybta kaalmada adeegyada, inge iverson. So [facility] [phone].    ATENCIÓN: Si habla español, tiene a acosta disposición servicios gratuitos de asistencia lingüística. Llame al [phone].    We comply with applicable federal civil rights laws and [place] laws. We do not discriminate on the basis of race, color, national origin, age, disability, sex, sexual orientation, or gender identity.            Thank you!     Thank you for choosing [facility]  for your care. Our goal is always to provide you with excellent care. Hearing back from our patients is one way we can continue to improve our services. Please take a few minutes to complete the written survey that you may receive in the mail after your visit with us.  Thank you!             Your Updated Medication List - Protect others around you: Learn how to safely use, store and throw away your medicines at www.disposemymeds.org.          This list is accurate as of 4/27/18  8:47 AM.  Always use your most recent med list.                   Brand Name Dispense Instructions for use Diagnosis    acetaminophen-codeine 300-30 MG per tablet    TYLENOL #3    100 tablet    TAKE 1-2 TABLETS BY MOUTH EVERY 6 HOURS AS NEEDED FOR PAIN    Tension-type headache, not intractable, unspecified chronicity pattern       butalbital-acetaminophen-caffeine -40 MG per tablet    FIORICET/ESGIC    90 tablet    TAKE 1 TO 2 TABLETS BY MOUTH EVERY 4 TO 6 HOURS AS NEEDED FOR HEADACHE. MAX OF 6 DOSES PER DAY.    Tension headache       calcium carbonate 600 MG tablet   Generic drug:  calcium carbonate      Take by mouth 2 times daily        celecoxib 200 MG capsule    celeBREX     Take 200 mg by mouth daily with food        Cranberry 500 MG Caps      Take by mouth daily        D 2000 2000 units tablet   Generic drug:  cholecalciferol      Take 2,000 Units by mouth daily        ibuprofen 200 MG tablet    ADVIL/MOTRIN     Take 600-800 mg by mouth as needed        polyethylene glycol Packet    MIRALAX/GLYCOLAX     Take by mouth daily as needed Take 1 cap by mouth daily if needed.        zolpidem 5 MG tablet    AMBIEN    30 tablet    TAKE 1/2-1 TABLET BY MOUTH EVERY DAY AT BEDTIME    Primary insomnia

## 2018-06-12 ENCOUNTER — OFFICE VISIT (OUTPATIENT)
Dept: SURGERY | Facility: OTHER | Age: 68
End: 2018-06-12
Attending: SURGERY
Payer: MEDICARE

## 2018-06-12 VITALS
BODY MASS INDEX: 21.6 KG/M2 | DIASTOLIC BLOOD PRESSURE: 74 MMHG | HEIGHT: 62 IN | SYSTOLIC BLOOD PRESSURE: 102 MMHG | WEIGHT: 117.4 LBS

## 2018-06-12 DIAGNOSIS — L98.9 SKIN LESION OF SCALP: ICD-10-CM

## 2018-06-12 DIAGNOSIS — Z86.007 HISTORY OF SQUAMOUS CELL CARCINOMA IN SITU: Primary | ICD-10-CM

## 2018-06-12 PROCEDURE — G0463 HOSPITAL OUTPT CLINIC VISIT: HCPCS | Performed by: SURGERY

## 2018-06-12 PROCEDURE — 99212 OFFICE O/P EST SF 10 MIN: CPT | Performed by: SURGERY

## 2018-06-12 ASSESSMENT — PAIN SCALES - GENERAL: PAINLEVEL: NO PAIN (0)

## 2018-06-12 NOTE — PROGRESS NOTES
Primary Care Physician: ADELINE MOTA MD      HPI:   Patient is here for follow up. The patient is 68 year old female with previous excision of SSC in site from the left side of her scalp in April. The margins were +. She has done well since then. No pain at the site. She doesn't feel a lump or bump. No itching or bleeding.      ASSESSMENT AND PLAN/RECOMMENDATIONS:   History of SSC insitu of the scalp. Continue to monitor-if recurrent lesion develops could treat with cryotherapy as there was no invasive component noted on pathology of previous lesion. Patient will follow up if she notes any new skin lesions. She denies questions at this time.     Past Medical History:   Diagnosis Date     Actinic keratosis     2/22/2013     Age-related osteoporosis without current pathological fracture     on Fosamax for 17 years--stopped 2011     Asymptomatic menopausal state     age 50     Closed displaced fracture of lateral malleolus of fibula     5/17/2010     Disorder of cartilage     No Comments Provided     Displaced fracture of lateral malleolus of unspecified fibula, initial encounter for open fracture type I or II     No Comments Provided     Dizziness and giddiness     No Comments Provided     Fracture of upper end of left humerus with routine healing     1/4/2018     Other cervical disc degeneration, unspecified cervical region     No Comments Provided     Other specified types of non-hodgkin lymphoma, extranodal and solid organ sites (H)     2011     Personal history of other malignant neoplasm of skin (CODE)     3/28/2017     Rheumatic fever without heart involvement     10+ years ago     Tension-type headache, not intractable     No Comments Provided      Past Surgical History:   Procedure Laterality Date     BIOPSY BREAST      mid 1990s,benign     COLONOSCOPY      age 55,normal     COLONOSCOPY      12/10/2015,lnormal--repeat 10 years     FUSION CERVICAL ANTERIOR ONE LEVEL      12/2012,C6-7 on left with  discectomy     LAMINECTOMY LUMBAR ONE LEVEL      ,L4-5     LAPAROSCOPIC TUBAL LIGATION      FOR ECTOPIC PREGNANCY     OTHER SURGICAL HISTORY      -,[id number],SKIN BIOPSY,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     OTHER SURGICAL HISTORY      ,75122.0,NM [place] ECTOP PREG BY LAPAROSCOPE,Left,left tube and ovary removed     TONSILLECTOMY           Family History   Problem Relation Age of Onset     HEART DISEASE Mother      Heart Disease, after cardiac bypass, age 71     HEART DISEASE Father      Heart Disease,congestive heart failure     Other - See Comments Other      headaches, otherwise A&W     Breast Cancer Other      Cancer-breast     Social History     Social History     Marital status:      Spouse name: N/A     Number of children: N/A     Years of education: N/A     Social History Main Topics     Smoking status: Never Smoker     Smokeless tobacco: Never Used     Alcohol use 0.6 oz/week      Comment: Alcoholic Drinks/day: once weekly she will have a glass of beer or wine with dinner.     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      No Known Allergies  REVIEW OF SYSTEMS  GENERAL: No fevers or chills. Denies fatigue, recent weight loss.  HEENT: No sinus  "drainage. No changes with vision or hearing. No difficulty swallowing.   LYMPHATICS:  No swollen nodes in axilla, neck or groin.  CARDIOVASCULAR: Denies chest pain, palpitations and dyspnea on exertion.  PULMONARY: No shortness of breath or cough. No increase in sputum production.  GI: Denies melena, bright red blood in stools. No hematemesis. No constipation or diarrhea.  : No dysuria or hematuria.  SKIN: No recent rashes or ulcers.   HEMATOLOGY:  No history of easy bruising or bleeding.  ENDOCRINE:  No history of diabetes or thyroid problems.  NEUROLOGY:  Nohistory of seizures or headaches. No motor or sensory changes.    PHYSICAL EXAM  Vitals: /74 (BP Location: Right arm, Patient Position: Sitting, Cuff Size: Adult Regular)  Ht 5' 1.75\" (1.568 m)  Wt 117 lb 6.4 oz (53.3 kg)  BMI 21.65 kg/m2  GENERAL: Healthy appearing patient in no acute distress. Pleasant and cooperative with exam and interview.   HEENT:Head-normocephalic. Eyes-no scleral icterus, pupils equal, round, and reactive to light. Nose-no nasal drainage. No lesions. Mouth-oral mucosa pink and moist, no lesions.  NECK: Supple. No thyroid nodules. Tracheamidline.  LYMPHATICS:  No cervical, axillary or supraclavicular adenopathy.  SKIN: Pink, warm and dry. No jaundice. No rash. Scalp with single exposed vicryl suture at previous excision site. No visual residual lesion. No ulceration.  NEURO:  Cranial nerves II-XII grossly intact. Alert and oriented.  PSYCH: Appropriate mood and affect.    IMAGING/LAB  I personally reviewed patient's pathology report-squamous cell carcinoma in situ-+ margins    "

## 2018-06-12 NOTE — MR AVS SNAPSHOT
"              After Visit Summary   2018    Corinne G Hultman    MRN: [id number]           Patient Information     Date Of Birth          1950        Visit Information        Provider Department      2018 8:50 AM Ashia Dill MD; [facility] 536 SURGERY [facility]         Follow-ups after your visit        Who to contact     If you have questions or need follow up information about today's clinic visit or your schedule please contact [facility] directly at [phone].  Normal or non-critical lab and imaging results will be communicated to you by Diet4Lifehart, letter or phone within 4 business days after the clinic has received the results. If you do not hear from us within 7 days, please contact the clinic through Bl[employer]omin[employer] or phone. If you have a critical or abnormal lab result, we will notify you by phone as soon as possible.  Submit refill requests through [employer] or call your pharmacy and they will forward the refill request to us. Please allow 3 business days for your refill to be completed.          Additional Information About Your Visit        MyChart Information     INCIDE lets you send messages to your doctor, view your test results, renew your prescriptions, schedule appointments and more. To sign up, go to [email or web address] . Click on \"Log in\" on the left side of the screen, which will take you to the Welcome page. Then click on \"Sign up Now\" on the right side of the page.     You will be asked to enter the access code listed below, as well as some personal information. Please follow the directions to create your username and password.     Your access code is: [email or web address]  Expires: 9/10/2018  2:47 PM     Your access code will  in 90 days. If you need help or a new code, please call your [facility] or [phone].        Care EveryWhere ID     This is your Care EveryWhere ID. This could be used by other organizations to access your " "[place] medical records  [id number]        Your Vitals Were     Height BMI (Body Mass Index)                5' 1.75\" (1.568 m) 21.65 kg/m2           Blood Pressure from Last 3 Encounters:   06/12/18 102/74   04/27/18 114/62   04/27/18 110/72    Weight from Last 3 Encounters:   06/12/18 117 lb 6.4 oz (53.3 kg)   04/27/18 116 lb 3.2 oz (52.7 kg)   04/27/18 114 lb (51.7 kg)              Today, you had the following     No orders found for display       Primary Care Provider Office Phone # Fax #    Amaury Guevara [phone]       [address] [facility] [address]        Equal Access to Services     BOY MÁRQUEZ : Hadii jennifer gutierrezo Soluis alberto, waaxda luqadaha, qaybta kaalmada adeegyada, inge lynch . So [facility] [phone].    ATENCIÓN: Si habla español, tiene a acosta disposición servicios gratuitos de asistencia lingüística. Llame al [phone].    We comply with applicable federal civil rights laws and [place] laws. We do not discriminate on the basis of race, color, national origin, age, disability, sex, sexual orientation, or gender identity.            Thank you!     Thank you for choosing [facility] AND [facility]  for your care. Our goal is always to provide you with excellent care. Hearing back from our patients is one way we can continue to improve our services. Please take a few minutes to complete the written survey that you may receive in the mail after your visit with us. Thank you!             Your Updated Medication List - Protect others around you: Learn how to safely use, store and throw away your medicines at www.disposemymeds.org.          This list is accurate as of 6/12/18  2:47 PM.  Always use your most recent med list.                   Brand Name Dispense Instructions for use Diagnosis    acetaminophen-codeine 300-30 MG per tablet    TYLENOL #3    100 tablet    TAKE 1-2 TABLETS BY MOUTH EVERY 6 HOURS AS NEEDED FOR PAIN    " Tension-type headache, not intractable, unspecified chronicity pattern       butalbital-acetaminophen-caffeine -40 MG per tablet    FIORICET/ESGIC    90 tablet    TAKE 1 TO 2 TABLETS BY MOUTH EVERY 4 TO 6 HOURS AS NEEDED FOR HEADACHE. MAX OF 6 DOSES PER DAY.    Tension headache       calcium carbonate 600 MG tablet   Generic drug:  calcium carbonate      Take by mouth 2 times daily        celecoxib 200 MG capsule    celeBREX     Take 200 mg by mouth daily with food        Cranberry 500 MG Caps      Take by mouth daily        D 2000 2000 units tablet   Generic drug:  cholecalciferol      Take 2,000 Units by mouth daily        ibuprofen 200 MG tablet    ADVIL/MOTRIN     Take 600-800 mg by mouth as needed        polyethylene glycol Packet    MIRALAX/GLYCOLAX     Take by mouth daily as needed Take 1 cap by mouth daily if needed.        zolpidem 5 MG tablet    AMBIEN    30 tablet    TAKE 1/2-1 TABLET BY MOUTH EVERY DAY AT BEDTIME    Primary insomnia

## 2018-06-12 NOTE — NURSING NOTE
Here today for possible re-excision of a scalp lesion.  Radha Lott LPN..........6/12/2018  9:16 AM

## 2018-06-14 PROBLEM — L98.9 SKIN LESION OF SCALP: Status: RESOLVED | Noted: 2018-04-27 | Resolved: 2018-06-14

## 2018-07-11 ENCOUNTER — OFFICE VISIT (OUTPATIENT)
Dept: FAMILY MEDICINE | Facility: OTHER | Age: 68
End: 2018-07-11
Attending: FAMILY MEDICINE
Payer: MEDICARE

## 2018-07-11 VITALS
HEART RATE: 80 BPM | WEIGHT: 116.8 LBS | BODY MASS INDEX: 21.54 KG/M2 | SYSTOLIC BLOOD PRESSURE: 156 MMHG | DIASTOLIC BLOOD PRESSURE: 85 MMHG

## 2018-07-11 DIAGNOSIS — Z51.81 ENCOUNTER FOR MONITORING NSAID THERAPY: ICD-10-CM

## 2018-07-11 DIAGNOSIS — M54.12 CERVICAL RADICULOPATHY: Primary | ICD-10-CM

## 2018-07-11 DIAGNOSIS — Z79.1 ENCOUNTER FOR MONITORING NSAID THERAPY: ICD-10-CM

## 2018-07-11 LAB
ALBUMIN SERPL-MCNC: 4.8 G/DL (ref 3.5–5.7)
ALP SERPL-CCNC: 45 U/L (ref 34–104)
ALT SERPL W P-5'-P-CCNC: 22 U/L (ref 7–52)
ANION GAP SERPL CALCULATED.3IONS-SCNC: 10 MMOL/L (ref 3–14)
AST SERPL W P-5'-P-CCNC: 29 U/L (ref 13–39)
BILIRUB SERPL-MCNC: 0.3 MG/DL (ref 0.3–1)
BUN SERPL-MCNC: 20 MG/DL (ref 7–25)
CALCIUM SERPL-MCNC: 10 MG/DL (ref 8.6–10.3)
CHLORIDE SERPL-SCNC: 101 MMOL/L (ref 98–107)
CO2 SERPL-SCNC: 25 MMOL/L (ref 21–31)
CREAT SERPL-MCNC: 0.72 MG/DL (ref 0.6–1.2)
ERYTHROCYTE [DISTWIDTH] IN BLOOD BY AUTOMATED COUNT: 13.2 % (ref 10–15)
GFR SERPL CREATININE-BSD FRML MDRD: 81 ML/MIN/1.7M2
GLUCOSE SERPL-MCNC: 90 MG/DL (ref 70–105)
HCT VFR BLD AUTO: 35.7 % (ref 35–47)
HGB BLD-MCNC: 11.9 G/DL (ref 11.7–15.7)
MCH RBC QN AUTO: 32 PG (ref 26.5–33)
MCHC RBC AUTO-ENTMCNC: 33.3 G/DL (ref 31.5–36.5)
MCV RBC AUTO: 96 FL (ref 78–100)
PLATELET # BLD AUTO: 232 10E9/L (ref 150–450)
POTASSIUM SERPL-SCNC: 4.1 MMOL/L (ref 3.5–5.1)
PROT SERPL-MCNC: 7.4 G/DL (ref 6.4–8.9)
RBC # BLD AUTO: 3.72 10E12/L (ref 3.8–5.2)
SODIUM SERPL-SCNC: 136 MMOL/L (ref 134–144)
WBC # BLD AUTO: 5.2 10E9/L (ref 4–11)

## 2018-07-11 PROCEDURE — 85027 COMPLETE CBC AUTOMATED: CPT | Performed by: FAMILY MEDICINE

## 2018-07-11 PROCEDURE — 36415 COLL VENOUS BLD VENIPUNCTURE: CPT | Performed by: FAMILY MEDICINE

## 2018-07-11 PROCEDURE — 99214 OFFICE O/P EST MOD 30 MIN: CPT | Performed by: FAMILY MEDICINE

## 2018-07-11 PROCEDURE — G0463 HOSPITAL OUTPT CLINIC VISIT: HCPCS

## 2018-07-11 PROCEDURE — 80053 COMPREHEN METABOLIC PANEL: CPT | Performed by: FAMILY MEDICINE

## 2018-07-11 RX ORDER — GABAPENTIN 100 MG/1
100 CAPSULE ORAL 3 TIMES DAILY
Qty: 90 CAPSULE | Refills: 1 | Status: SHIPPED | OUTPATIENT
Start: 2018-07-11 | End: 2018-10-16

## 2018-07-11 ASSESSMENT — PAIN SCALES - GENERAL: PAINLEVEL: SEVERE PAIN (6)

## 2018-07-11 NOTE — NURSING NOTE
Pt presents to clinic today for neck pain that travels into her right shoulder down her arm x 2.5 wk. Ibuprofen 800mg  was last taken at 0800 today.  Vianney Garcia

## 2018-07-11 NOTE — PATIENT INSTRUCTIONS
OK to use Tylenol #3 more frequently at 2 to 3 times daily  Try gabapentin to help with the shoulder and arm pain as this is a better choice for nerve pain. Start at 100 mg each night for a couple nights. Increase to 100 mg twice daily and if tolerating, then increase to 100 mg up to three times per day, but OK to take 200 mg at once in the evening if that works better.     No ibuprofen, aspirin, naproxen. If labs are fine, then OK to stay on Celebrex.    Referral to neurosurgery  Ordered epidural injection of the cervical spine. If the injection helps a fair amount, then see physical therapy

## 2018-07-11 NOTE — PROGRESS NOTES
Nursing Notes:   Vianney Garcia, LPN  7/11/2018  8:35 AM  Signed  Pt presents to clinic today for neck pain that travels into her right shoulder down her arm x 2.5 wk. Ibuprofen 800mg  was last taken at 0800 today.  Vianney Garcia     SUBJECTIVE:  68 year old female presents for neck pain for 2 1/2 weeks. Just woke up with the pain. No known injury. Pain in right scapula radiates down arm. Has paresthesias down arm. Having trouble with fine motor skills in the right arm.  Notes weakness in the right arm.    Hx of radiation to neck for lymphoma in 2011. Had L arm radicular symptoms and had a fusion at C5-6 in 2012.  She did not try PT or epidural injections prior to surgery in 2012.    Taking ibuprofen 800 mg multiple times daily.  She also is taking Excedrin to help with the pain.  Celebrex is on her list and she is using that as well. Using T#3 two pills daily.  Is managing the pain, but wishes for better relief.    REVIEW OF SYSTEMS:    Constitutional: negative  Respiratory: negative  Cardiovascular: negative    Current Outpatient Prescriptions   Medication Sig Dispense Refill     acetaminophen-codeine (TYLENOL #3) 300-30 MG per tablet TAKE 1-2 TABLETS BY MOUTH EVERY 6 HOURS AS NEEDED FOR PAIN 100 tablet 3     butalbital-acetaminophen-caffeine (FIORICET/ESGIC) -40 MG per tablet TAKE 1 TO 2 TABLETS BY MOUTH EVERY 4 TO 6 HOURS AS NEEDED FOR HEADACHE. MAX OF 6 DOSES PER DAY. 90 tablet 3     calcium carbonate (CALCIUM CARBONATE) 600 MG tablet Take by mouth 2 times daily       celecoxib (CELEBREX) 200 MG capsule Take 200 mg by mouth daily with food       cholecalciferol (D 2000) 2000 UNITS tablet Take 2,000 Units by mouth daily       Cranberry 500 MG CAPS Take by mouth daily       ibuprofen (ADVIL/MOTRIN) 200 MG tablet Take 600-800 mg by mouth as needed       polyethylene glycol (MIRALAX/GLYCOLAX) Packet Take by mouth daily as needed Take 1 cap by mouth daily if needed.       ranitidine (ZANTAC) 150 MG capsule  Take 150 mg by mouth 2 times daily       zolpidem (AMBIEN) 5 MG tablet TAKE 1/2-1 TABLET BY MOUTH EVERY DAY AT BEDTIME 30 tablet 5     No Known Allergies    OBJECTIVE:  /85 (BP Location: Right arm, Patient Position: Chair, Cuff Size: Adult Regular)  Pulse 80  Wt 116 lb 12.8 oz (53 kg)  BMI 21.54 kg/m2    EXAM:  General Appearance: Alert. No acute distress  Neck: Tender in inferior cervical spine over vertebrae as well as muscles.  Tender near right scapula.  Negative Spurling sign.  Neurological: Right upper extremity weakness noted in shoulder abduction, elbow flexion and extension, wrist extension, finger abduction. Strength on left 5/5 upper extremity  Psychiatric: Normal affect and mentation    Results for orders placed or performed in visit on 07/11/18   Comprehensive metabolic panel   Result Value Ref Range    Sodium 136 134 - 144 mmol/L    Potassium 4.1 3.5 - 5.1 mmol/L    Chloride 101 98 - 107 mmol/L    Carbon Dioxide 25 21 - 31 mmol/L    Anion Gap 10 3 - 14 mmol/L    Glucose 90 70 - 105 mg/dL    Urea Nitrogen 20 7 - 25 mg/dL    Creatinine 0.72 0.60 - 1.20 mg/dL    GFR Estimate 81 >60 mL/min/1.7m2    GFR Estimate If Black >90 >60 mL/min/1.7m2    Calcium 10.0 8.6 - 10.3 mg/dL    Bilirubin Total 0.3 0.3 - 1.0 mg/dL    Albumin 4.8 3.5 - 5.7 g/dL    Protein Total 7.4 6.4 - 8.9 g/dL    Alkaline Phosphatase 45 34 - 104 U/L    ALT 22 7 - 52 U/L    AST 29 13 - 39 U/L   CBC W PLT No Diff   Result Value Ref Range    WBC 5.2 4.0 - 11.0 10e9/L    RBC Count 3.72 (L) 3.8 - 5.2 10e12/L    Hemoglobin 11.9 11.7 - 15.7 g/dL    Hematocrit 35.7 35.0 - 47.0 %    MCV 96 78 - 100 fl    MCH 32.0 26.5 - 33.0 pg    MCHC 33.3 31.5 - 36.5 g/dL    RDW 13.2 10.0 - 15.0 %    Platelet Count 232 150 - 450 10e9/L        ASSESSMENT/PLAN:    ICD-10-CM    1. Cervical radiculopathy M54.12 MR Cervical Spine w/o Contrast     NEUROSURGERY REFERRAL     XR Cervical/Thoracic Epidural Inj Incl Imaging     gabapentin (NEURONTIN) 100 MG capsule      acetaminophen-codeine (TYLENOL #3) 300-30 MG per tablet   2. Encounter for monitoring NSAID therapy Z51.81 Comprehensive metabolic panel    Z79.1 CBC W PLT No Diff     She is a new right cervical radiculopathy with previous left radiculopathy that resolved with surgery in 2012.  Weakness is noted, so this issue needs to be addressed with some urgency.  Recommend updated MRI.  She may have good benefit from a cervical epidural injection.  If she gets enough relief, see 50-75% from the injection, then recommend working with physical therapy to resolve symptoms.  However, if the injection is not beneficial, then she should see neurosurgery to discuss another fusion.  Currently, her weakness seems to involve more than 1 cervical level.  If this remains the case, she may require EMG before surgery.    Ordered MRI, injection, surgery referral.  She would like to see [facility] neurosurgery.    Is currently taking 3 NSAIDs.  This appears to be causing hypertension.  Recommend labs to assess for liver or kidney problems because of overuse.  Labs returned showing a slightly elevated creatinine and BUN compared to previous readings.  It is not a lot higher than her previous baseline however.  She should stop ibuprofen and aspirin, but may continue with Celebrex.    Recommend starting gabapentin to try to help with some of the radicular pain using 100 mg at night working up to 100 mg 3 times daily.  Refilled Tylenol 3, she is currently taking 2 daily, but may take 2 pills a few times daily to make up for stopping the NSAIDs.    F/U pending results of imaging    Jose Duffy MD    This document was prepared using a combination of typing and voice generated software.  While every attempt was made for accuracy, spelling and grammatical errors may exist.

## 2018-07-11 NOTE — MR AVS SNAPSHOT
After Visit Summary   7/11/2018    Corinne G Hultman    MRN: [id number]           Patient Information     Date Of Birth          1950        Visit Information        Provider Department      7/11/2018 8:30 AM Jose Duffy MD [facility]        Today's Diagnoses     Cervical radiculopathy    -  1    Encounter for monitoring NSAID therapy          Care Instructions    OK to use Tylenol #3 more frequently at 2 to 3 times daily  Try gabapentin to help with the shoulder and arm pain as this is a better choice for nerve pain. Start at 100 mg each night for a couple nights. Increase to 100 mg twice daily and if tolerating, then increase to 100 mg up to three times per day, but OK to take 200 mg at once in the evening if that works better.     No ibuprofen, aspirin, naproxen. If labs are fine, then OK to stay on Celebrex.    Referral to neurosurgery  Ordered epidural injection of the cervical spine. If the injection helps a fair amount, then see physical therapy          Follow-ups after your visit        Additional Services     NEUROSURGERY REFERRAL       Your provider has referred you to: Tavares Khoury    Please be aware that coverage of these services is subject to the terms and limitations of your health insurance plan.  Call member services at your health plan with any benefit or coverage questions.      Please bring the following with you to your appointment:    (1) Any X-Rays, CTs or MRIs which have been performed.  Contact the facility where they were done to arrange for  prior to your scheduled appointment.   (2) List of current medications  (3) This referral request   (4) Any documents/labs given to you for this referral                  Future tests that were ordered for you today     Open Future Orders        Priority Expected Expires Ordered    XR Cervical/Thoracic Epidural Inj Incl Imaging Routine 7/11/2018 7/11/2019 7/11/2018    MR Cervical Spine w/o Contrast  "Routine  2019            Who to contact     If you have questions or need follow up information about today's clinic visit or your schedule please contact [facility] directly at [phone].  Normal or non-critical lab and imaging results will be communicated to you by MyChart, letter or phone within 4 business days after the clinic has received the results. If you do not hear from us within 7 days, please contact the clinic through MyChart or phone. If you have a critical or abnormal lab result, we will notify you by phone as soon as possible.  Submit refill requests through Bluefly or call your pharmacy and they will forward the refill request to us. Please allow 3 business days for your refill to be completed.          Additional Information About Your Visit        EpiVaxhart Information     Bluefly lets you send messages to your doctor, view your test results, renew your prescriptions, schedule appointments and more. To sign up, go to [email or web address] . Click on \"Log in\" on the left side of the screen, which will take you to the Welcome page. Then click on \"Sign up Now\" on the right side of the page.     You will be asked to enter the access code listed below, as well as some personal information. Please follow the directions to create your username and password.     Your access code is: [email or web address]  Expires: 9/10/2018  2:47 PM     Your access code will  in 90 days. If you need help or a new code, please call your [place] clinic or [phone].        Care EveryWhere ID     This is your Care EveryWhere ID. This could be used by other organizations to access your [place] medical records  [id number]        Your Vitals Were     Pulse BMI (Body Mass Index)                80 21.54 kg/m2           Blood Pressure from Last 3 Encounters:   18 156/85   18 102/74   18 114/62    Weight from Last 3 Encounters:   18 116 lb 12.8 oz (53 kg)   18 117 lb " 6.4 oz (53.3 kg)   04/27/18 116 lb 3.2 oz (52.7 kg)              We Performed the Following     CBC W PLT No Diff     Comprehensive metabolic panel     NEUROSURGERY REFERRAL          Today's Medication Changes          These changes are accurate as of 7/11/18  9:07 AM.  If you have any questions, ask your nurse or doctor.               Start taking these medicines.        Dose/Directions    gabapentin 100 MG capsule   Commonly known as:  NEURONTIN   Used for:  Cervical radiculopathy   Started by:  Jose Duffy MD        Dose:  100 mg   Take 1 capsule (100 mg) by mouth 3 times daily   Quantity:  90 capsule   Refills:  1         These medicines have changed or have updated prescriptions.        Dose/Directions    acetaminophen-codeine 300-30 MG per tablet   Commonly known as:  TYLENOL #3   This may have changed:  See the new instructions.   Used for:  Cervical radiculopathy   Changed by:  Jose Duffy MD        Dose:  1-2 tablet   Take 1-2 tablets by mouth every 6 hours as needed for severe pain   Quantity:  120 tablet   Refills:  3            Where to get your medicines      These medications were sent to [email or web address] [address] [place], [place] - [address]  [address], [facility] [address]     Phone:  [phone]     gabapentin 100 MG capsule         Some of these will need a paper prescription and others can be bought over the counter.  Ask your nurse if you have questions.     Bring a paper prescription for each of these medications     acetaminophen-codeine 300-30 MG per tablet               Information about OPIOIDS     PRESCRIPTION OPIOIDS: WHAT YOU NEED TO KNOW   We gave you an opioid (narcotic) pain medicine. It is important to manage your pain, but opioids are not always the best choice. You should first try all the other options your care team gave you. Take this medicine for as short a time (and as few doses) as possible.     These medicines have  risks:    DO NOT drive when on new or higher doses of pain medicine. These medicines can affect your alertness and reaction times, and you could be arrested for driving under the influence (DUI). If you need to use opioids long-term, talk to your care team about driving.    DO NOT operate heave machinery    DO NOT do any other dangerous activities while taking these medicines.     DO NOT drink any alcohol while taking these medicines.      If the opioid prescribed includes acetaminophen, DO NOT take with any other medicines that contain acetaminophen. Read all labels carefully. Look for the word  acetaminophen  or  Tylenol.  Ask your pharmacist if you have questions or are unsure.    You can get addicted to pain medicines, especially if you have a history of addiction (chemical, alcohol or substance dependence). Talk to your care team about ways to reduce this risk.    Store your pills in a secure place, locked if possible. We will not replace any lost or stolen medicine. If you don t finish your medicine, please throw away (dispose) as directed by your pharmacist. The [place] Pollution Control Agency has more information about safe disposal: https://www.pca.[facility].[place].us/living-green/managing-unwanted-medications.     All opioids tend to cause constipation. Drink plenty of water and eat foods that have a lot of fiber, such as fruits, vegetables, prune juice, apple juice and high-fiber cereal. Take a laxative (Miralax, milk of magnesia, Colace, Senna) if you don t move your bowels at least every other day.          Primary Care Provider Office Phone # Fax #    Amaury Guevara -[phone] [address] [facility] [address]        Equal Access to Services     [facility]: Hadii jennifer feldman Soluis alberto, waaxda luqadaha, qaybta kaalinge joiner . [facility] [phone].    ATENCIÓN: Si habla español, tiene a acosta disposición servicios  amish de asistencia lingüística. Brennen garcia [phone].    We comply with applicable federal civil rights laws and [place] laws. We do not discriminate on the basis of race, color, national origin, age, disability, sex, sexual orientation, or gender identity.            Thank you!     Thank you for choosing [facility]  for your care. Our goal is always to provide you with excellent care. Hearing back from our patients is one way we can continue to improve our services. Please take a few minutes to complete the written survey that you may receive in the mail after your visit with us. Thank you!             Your Updated Medication List - Protect others around you: Learn how to safely use, store and throw away your medicines at www.disposemymeds.org.          This list is accurate as of 7/11/18  9:07 AM.  Always use your most recent med list.                   Brand Name Dispense Instructions for use Diagnosis    acetaminophen-codeine 300-30 MG per tablet    TYLENOL #3    120 tablet    Take 1-2 tablets by mouth every 6 hours as needed for severe pain    Cervical radiculopathy       butalbital-acetaminophen-caffeine -40 MG per tablet    FIORICET/ESGIC    90 tablet    TAKE 1 TO 2 TABLETS BY MOUTH EVERY 4 TO 6 HOURS AS NEEDED FOR HEADACHE. MAX OF 6 DOSES PER DAY.    Tension headache       calcium carbonate 600 MG tablet   Generic drug:  calcium carbonate      Take by mouth 2 times daily        celecoxib 200 MG capsule    celeBREX     Take 200 mg by mouth daily with food        Cranberry 500 MG Caps      Take by mouth daily        D 2000 2000 units tablet   Generic drug:  cholecalciferol      Take 2,000 Units by mouth daily        gabapentin 100 MG capsule    NEURONTIN    90 capsule    Take 1 capsule (100 mg) by mouth 3 times daily    Cervical radiculopathy       ibuprofen 200 MG tablet    ADVIL/MOTRIN     Take 600-800 mg by mouth as needed        polyethylene glycol Packet    MIRALAX/GLYCOLAX      Take by mouth daily as needed Take 1 cap by mouth daily if needed.        ranitidine 150 MG capsule    ZANTAC     Take 150 mg by mouth 2 times daily        zolpidem 5 MG tablet    AMBIEN    30 tablet    TAKE 1/2-1 TABLET BY MOUTH EVERY DAY AT BEDTIME    Primary insomnia

## 2018-07-12 ENCOUNTER — HOSPITAL ENCOUNTER (OUTPATIENT)
Dept: MRI IMAGING | Facility: OTHER | Age: 68
Discharge: HOME | End: 2018-07-12
Attending: FAMILY MEDICINE | Admitting: FAMILY MEDICINE
Payer: MEDICARE

## 2018-07-12 DIAGNOSIS — M54.12 CERVICAL RADICULOPATHY: ICD-10-CM

## 2018-07-12 PROCEDURE — 72141 MRI NECK SPINE W/O DYE: CPT

## 2018-07-17 ENCOUNTER — MYC MEDICAL ADVICE (OUTPATIENT)
Dept: FAMILY MEDICINE | Facility: OTHER | Age: 68
End: 2018-07-17

## 2018-07-17 NOTE — TELEPHONE ENCOUNTER
[facility] was called and made aware of the order. They will call patient and get her scheduled.   I called the patient and let her know that [facility] will be calling her to schedule. 11:15 07/17/2018/clarissa DALE

## 2018-07-23 ENCOUNTER — HOSPITAL ENCOUNTER (OUTPATIENT)
Dept: GENERAL RADIOLOGY | Facility: OTHER | Age: 68
Discharge: HOME | End: 2018-07-23
Attending: FAMILY MEDICINE | Admitting: FAMILY MEDICINE
Payer: MEDICARE

## 2018-07-23 DIAGNOSIS — M54.12 CERVICAL RADICULOPATHY: ICD-10-CM

## 2018-07-23 PROCEDURE — 25000128 H RX IP 250 OP 636: Performed by: RADIOLOGY

## 2018-07-23 PROCEDURE — 62321 NJX INTERLAMINAR CRV/THRC: CPT

## 2018-07-23 PROCEDURE — 25500064 ZZH RX 255 OP 636: Performed by: RADIOLOGY

## 2018-07-23 PROCEDURE — 25000125 ZZHC RX 250: Performed by: RADIOLOGY

## 2018-07-23 RX ORDER — BETAMETHASONE SODIUM PHOSPHATE AND BETAMETHASONE ACETATE 3; 3 MG/ML; MG/ML
12 INJECTION, SUSPENSION INTRA-ARTICULAR; INTRALESIONAL; INTRAMUSCULAR; SOFT TISSUE ONCE
Status: COMPLETED | OUTPATIENT
Start: 2018-07-23 | End: 2018-07-23

## 2018-07-23 RX ADMIN — IOHEXOL 5 ML: 240 INJECTION, SOLUTION INTRATHECAL; INTRAVASCULAR; INTRAVENOUS; ORAL at 09:26

## 2018-07-23 RX ADMIN — BETAMETHASONE SODIUM PHOSPHATE AND BETAMETHASONE ACETATE 12 MG: 3; 3 INJECTION, SUSPENSION INTRA-ARTICULAR; INTRALESIONAL; INTRAMUSCULAR at 09:11

## 2018-07-23 RX ADMIN — LIDOCAINE HYDROCHLORIDE 4 ML: 10 INJECTION, SOLUTION INFILTRATION; PERINEURAL at 09:26

## 2018-07-24 NOTE — PROGRESS NOTES
Patient Information     Patient Name  Hultman, Corinne G MRN  [id number] Sex  Female   1950      Letter by Bryant Garcia MD at      Author:  Bryant Garcia MD Service:  (none) Author Type:  (none)    Filed:   Date of Service:   Status:  (Other)       [facility]  [address] [facility] [address]  [phone]       Corinne G Hultman   [address] [facility] [address]    2017  Date of Breast Imaging: 10/11/2017  3:32 PM    Dear Ms. Avila:    We are pleased to inform you that the result of your recent breast imaging examination is normal/benign (not cancer).    A report of your results was sent to your health care provider(s).  Your mammogram shows that your breast tissue is dense.  Dense breast tissue is relatively common and is found in more than 40 percent of women.  However, dense breast tissue may make it harder to identify cancer through a mammogram.  It may also be related to an increased risk of breast cancer.    The results of your mammogram are given to you and your primary care provider.  This information will raise your own awareness and help you talk with your primary care provider about your screening options.  Together you can decide which options are right for you based on your mammogram results, risk factors or physical exam.      Your images will become part of your medical file here at [facility] and will be available for your continuing care. You are responsible for informing any new health care provider or breast imaging facility of the date and location of this examination.    Although mammography is the most accurate method for early detection, not all cancers are found through mammography. If you notice any new changes in your breast(s) please inform your health care provider without delay.    Thank you for choosing [facility] to participate in your healthcare needs.       [facility] Recommendations for Early Breast Cancer Detection   in Women without Symptoms  When to start having mammograms to screen for breast cancer, and how often to have them, is a personal decision. It should be based on your preferences, your values and your risk for developing breast cancer. [facility] recommends that you and your health care provider together determine when mammograms are right for you.    [facility] recommends the following guidelines for women who have an average risk for breast cancer, based on American Cancer Society guidelines:    Age 40 to 44: Mammograms are optional.     Age 45 to 54: Have a mammogram every year.           Age 55 and older: Have a mammogram every year, or transition to having one every 2 years. Continue to have mammograms as long as your health is good.    If you have a higher than average risk for breast cancer, your health care provider may recommend a different schedule.

## 2018-07-31 ENCOUNTER — MYC MEDICAL ADVICE (OUTPATIENT)
Dept: FAMILY MEDICINE | Facility: OTHER | Age: 68
End: 2018-07-31

## 2018-08-01 ENCOUNTER — MYC MEDICAL ADVICE (OUTPATIENT)
Dept: FAMILY MEDICINE | Facility: OTHER | Age: 68
End: 2018-08-01

## 2018-10-12 ENCOUNTER — HOSPITAL ENCOUNTER (OUTPATIENT)
Dept: MAMMOGRAPHY | Facility: OTHER | Age: 68
Discharge: HOME | End: 2018-10-12
Attending: FAMILY MEDICINE | Admitting: FAMILY MEDICINE
Payer: MEDICARE

## 2018-10-12 DIAGNOSIS — Z12.31 VISIT FOR SCREENING MAMMOGRAM: ICD-10-CM

## 2018-10-12 PROCEDURE — 77067 SCR MAMMO BI INCL CAD: CPT

## 2018-10-16 ENCOUNTER — OFFICE VISIT (OUTPATIENT)
Dept: FAMILY MEDICINE | Facility: OTHER | Age: 68
End: 2018-10-16
Attending: FAMILY MEDICINE
Payer: MEDICARE

## 2018-10-16 VITALS
HEART RATE: 88 BPM | HEIGHT: 62 IN | BODY MASS INDEX: 21.12 KG/M2 | DIASTOLIC BLOOD PRESSURE: 84 MMHG | RESPIRATION RATE: 16 BRPM | SYSTOLIC BLOOD PRESSURE: 133 MMHG | WEIGHT: 114.8 LBS

## 2018-10-16 DIAGNOSIS — F51.01 PRIMARY INSOMNIA: ICD-10-CM

## 2018-10-16 DIAGNOSIS — Z11.59 NEED FOR HEPATITIS C SCREENING TEST: ICD-10-CM

## 2018-10-16 DIAGNOSIS — Z00.00 ANNUAL PHYSICAL EXAM: Primary | ICD-10-CM

## 2018-10-16 DIAGNOSIS — Z23 NEED FOR PNEUMOCOCCAL VACCINATION: ICD-10-CM

## 2018-10-16 DIAGNOSIS — M54.12 CERVICAL RADICULOPATHY: ICD-10-CM

## 2018-10-16 DIAGNOSIS — Z85.72 HISTORY OF FOLLICULAR LYMPHOMA: ICD-10-CM

## 2018-10-16 DIAGNOSIS — Z51.81 ENCOUNTER FOR MONITORING NSAID THERAPY: ICD-10-CM

## 2018-10-16 DIAGNOSIS — M54.12 RIGHT CERVICAL RADICULOPATHY: ICD-10-CM

## 2018-10-16 DIAGNOSIS — Z79.1 ENCOUNTER FOR MONITORING NSAID THERAPY: ICD-10-CM

## 2018-10-16 DIAGNOSIS — G44.209 TENSION HEADACHE: ICD-10-CM

## 2018-10-16 DIAGNOSIS — R51.9 CHRONIC DAILY HEADACHE: ICD-10-CM

## 2018-10-16 LAB
ALBUMIN SERPL-MCNC: 4.9 G/DL (ref 3.5–5.7)
ALP SERPL-CCNC: 47 U/L (ref 34–104)
ALT SERPL W P-5'-P-CCNC: 23 U/L (ref 7–52)
ANION GAP SERPL CALCULATED.3IONS-SCNC: 6 MMOL/L (ref 3–14)
AST SERPL W P-5'-P-CCNC: 25 U/L (ref 13–39)
BILIRUB SERPL-MCNC: 0.6 MG/DL (ref 0.3–1)
BUN SERPL-MCNC: 13 MG/DL (ref 7–25)
CALCIUM SERPL-MCNC: 10 MG/DL (ref 8.6–10.3)
CHLORIDE SERPL-SCNC: 99 MMOL/L (ref 98–107)
CO2 SERPL-SCNC: 30 MMOL/L (ref 21–31)
CREAT SERPL-MCNC: 0.64 MG/DL (ref 0.6–1.2)
GFR SERPL CREATININE-BSD FRML MDRD: >90 ML/MIN/1.7M2
GLUCOSE SERPL-MCNC: 97 MG/DL (ref 70–105)
POTASSIUM SERPL-SCNC: 4.2 MMOL/L (ref 3.5–5.1)
PROT SERPL-MCNC: 7.2 G/DL (ref 6.4–8.9)
SODIUM SERPL-SCNC: 135 MMOL/L (ref 134–144)

## 2018-10-16 PROCEDURE — 80053 COMPREHEN METABOLIC PANEL: CPT | Performed by: FAMILY MEDICINE

## 2018-10-16 PROCEDURE — 99213 OFFICE O/P EST LOW 20 MIN: CPT | Mod: 25 | Performed by: FAMILY MEDICINE

## 2018-10-16 PROCEDURE — G0463 HOSPITAL OUTPT CLINIC VISIT: HCPCS

## 2018-10-16 PROCEDURE — 99397 PER PM REEVAL EST PAT 65+ YR: CPT | Performed by: FAMILY MEDICINE

## 2018-10-16 PROCEDURE — 86803 HEPATITIS C AB TEST: CPT | Performed by: FAMILY MEDICINE

## 2018-10-16 PROCEDURE — 90732 PPSV23 VACC 2 YRS+ SUBQ/IM: CPT | Performed by: FAMILY MEDICINE

## 2018-10-16 PROCEDURE — G0009 ADMIN PNEUMOCOCCAL VACCINE: HCPCS

## 2018-10-16 PROCEDURE — 36415 COLL VENOUS BLD VENIPUNCTURE: CPT | Performed by: FAMILY MEDICINE

## 2018-10-16 PROCEDURE — G0472 HEP C SCREEN HIGH RISK/OTHER: HCPCS | Performed by: FAMILY MEDICINE

## 2018-10-16 PROCEDURE — G0463 HOSPITAL OUTPT CLINIC VISIT: HCPCS | Mod: 25

## 2018-10-16 RX ORDER — BUTALBITAL, ACETAMINOPHEN AND CAFFEINE 50; 325; 40 MG/1; MG/1; MG/1
1 TABLET ORAL DAILY PRN
Qty: 90 TABLET | Refills: 3 | Status: SHIPPED | OUTPATIENT
Start: 2018-10-16 | End: 2019-05-20

## 2018-10-16 RX ORDER — CELECOXIB 200 MG/1
200 CAPSULE ORAL
Qty: 90 CAPSULE | Refills: 3 | Status: SHIPPED | OUTPATIENT
Start: 2018-10-16 | End: 2019-05-20

## 2018-10-16 RX ORDER — ZOLPIDEM TARTRATE 5 MG/1
TABLET ORAL
Qty: 30 TABLET | Refills: 5 | Status: SHIPPED | OUTPATIENT
Start: 2018-10-16 | End: 2018-11-27

## 2018-10-16 ASSESSMENT — PAIN SCALES - GENERAL: PAINLEVEL: NO PAIN (0)

## 2018-10-16 NOTE — MR AVS SNAPSHOT
After Visit Summary   10/16/2018    Corinne G Hultman    MRN: [id number]           Patient Information     Date Of Birth          1950        Visit Information        Provider Department      10/16/2018 8:30 AM [facility]        Today's Diagnoses     Need for pneumococcal vaccination    -  1    Annual physical exam        Chronic daily headache        Tension headache        Right cervical radiculopathy        History of follicular lymphoma        Primary insomnia        Need for hepatitis C screening test        Encounter for monitoring NSAID therapy          Care Instructions    Labs to MyChart  Referral to physical therapy  Ordered brain MRI  Work to reduce use of Ambien by slowly weaning.  If PT helps neck, then use less Tylenol #3 and Fioricet  Given Pneumovax for pneumonia  Follow-up by 6 months before refill on Tylenol #3          Follow-ups after your visit        Additional Services     PHYSICAL THERAPY REFERRAL       If you have not heard from the scheduling office within 2 business days, please call [phone] for all locations, with the exception of [place], please call [phone] and [facility] [place], please call [phone].    Please be aware that coverage of these services is subject to the terms and limitations of your health insurance plan.  Call member services at your health plan with any benefit or coverage questions.                  Future tests that were ordered for you today     Open Future Orders        Priority Expected Expires Ordered    Hepatitis C Screen Reflex to HCV RNA Quant and Genotype Routine  10/16/2019 10/16/2018    MR Brain w/o & w Contrast Routine  10/16/2019 10/16/2018    Comprehensive Metabolic Panel Routine  10/16/2019 10/16/2018    PHYSICAL THERAPY REFERRAL Routine  10/16/2019 10/16/2018            Who to contact     If you have questions or need follow up information about today's clinic visit or your schedule please  "contact [facility] directly at [phone].  Normal or non-critical lab and imaging results will be communicated to you by MyChart, letter or phone within 4 business days after the clinic has received the results. If you do not hear from us within 7 days, please contact the clinic through [employer] Cor[employer] or phone. If you have a critical or abnormal lab result, we will notify you by phone as soon as possible.  Submit refill requests through MONOCO or call your pharmacy and they will forward the refill request to us. Please allow 3 business days for your refill to be completed.          Additional Information About Your Visit        [employer] Information     MONOCO gives you secure access to your electronic health record. If you see a primary care provider, you can also send messages to your care team and make appointments. If you have questions, please call your primary care clinic.  If you do not have a primary care provider, please call [phone] and they will assist you.        Care EveryWhere ID     This is your Care EveryWhere ID. This could be used by other organizations to access your [place] medical records  [id number]        Your Vitals Were     Pulse Respirations Height BMI (Body Mass Index)          88 16 5' 1.5\" (1.562 m) 21.34 kg/m2         Blood Pressure from Last 3 Encounters:   10/16/18 133/84   07/11/18 156/85   06/12/18 102/74    Weight from Last 3 Encounters:   10/16/18 114 lb 12.8 oz (52.1 kg)   07/11/18 116 lb 12.8 oz (53 kg)   06/12/18 117 lb 6.4 oz (53.3 kg)              We Performed the Following     GH IMM-  PNEUMOCOCCAL VACCINE,ADULT,SQ OR IM          Today's Medication Changes          These changes are accurate as of 10/16/18  9:22 AM.  If you have any questions, ask your nurse or doctor.               These medicines have changed or have updated prescriptions.        Dose/Directions    butalbital-acetaminophen-caffeine -40 MG per tablet   Commonly known as:  " FIORICET/ESGIC   This may have changed:  See the new instructions.   Used for:  Tension headache   Changed by:  Jose Duffy MD        Dose:  1 tablet   Take 1 tablet by mouth daily as needed for headaches   Quantity:  90 tablet   Refills:  3         Stop taking these medicines if you haven't already. Please contact your care team if you have questions.     gabapentin 100 MG capsule   Commonly known as:  NEURONTIN   Stopped by:  Jose Duffy MD           ibuprofen 200 MG tablet   Commonly known as:  ADVIL/MOTRIN   Stopped by:  Jose Duffy MD           ranitidine 150 MG capsule   Commonly known as:  ZANTAC   Stopped by:  Jose Duffy MD                Where to get your medicines      These medications were sent to [facility] - [place], [place] - [address] AT [address], [facility] [address]     Phone:  [phone]     celecoxib 200 MG capsule         Some of these will need a paper prescription and others can be bought over the counter.  Ask your nurse if you have questions.     Bring a paper prescription for each of these medications     butalbital-acetaminophen-caffeine -40 MG per tablet    zolpidem 5 MG tablet                Primary Care Provider Office Phone # Fax #    Amaury Guevara -[phone]       [address] [facility] [address]        Equal Access to Services     [facility]: Hadii jennifer yun hadasho Soluis alberto, waaxda luqadaha, qaybta kaalmada adeyadirada, inge iverson. So [facility] [phone].    ATENCIÓN: Si habla español, tiene a acosta disposición servicios gratuitos de asistencia lingüística. Brennen al [phone].    We comply with applicable federal civil rights laws and [place] laws. We do not discriminate on the basis of race, color, national origin, age, disability, sex, sexual orientation, or gender identity.            Thank you!     Thank you for choosing  [facility]  for your care. Our goal is always to provide you with excellent care. Hearing back from our patients is one way we can continue to improve our services. Please take a few minutes to complete the written survey that you may receive in the mail after your visit with us. Thank you!             Your Updated Medication List - Protect others around you: Learn how to safely use, store and throw away your medicines at www.disposemymeds.org.          This list is accurate as of 10/16/18  9:22 AM.  Always use your most recent med list.                   Brand Name Dispense Instructions for use Diagnosis    acetaminophen-codeine 300-30 MG per tablet    TYLENOL #3    120 tablet    Take 1-2 tablets by mouth every 6 hours as needed for severe pain    Cervical radiculopathy       butalbital-acetaminophen-caffeine -40 MG per tablet    FIORICET/ESGIC    90 tablet    Take 1 tablet by mouth daily as needed for headaches    Tension headache       calcium carbonate 600 MG tablet   Generic drug:  calcium carbonate      Take by mouth 2 times daily        celecoxib 200 MG capsule    celeBREX    90 capsule    Take 1 capsule (200 mg) by mouth daily with food    Right cervical radiculopathy       Cranberry 500 MG Caps      Take by mouth daily        D 2000 2000 units tablet   Generic drug:  cholecalciferol      Take 2,000 Units by mouth daily        polyethylene glycol Packet    MIRALAX/GLYCOLAX     Take by mouth daily as needed Take 1 cap by mouth daily if needed.        zolpidem 5 MG tablet    AMBIEN    30 tablet    TAKE 1/2-1 TABLET BY MOUTH EVERY DAY AT BEDTIME    Primary insomnia

## 2018-10-16 NOTE — NURSING NOTE
"Chief Complaint   Patient presents with     Physical       Initial /84 (BP Location: Right arm, Patient Position: Chair, Cuff Size: Adult Regular)  Pulse 88  Resp 16  Ht 5' 1.5\" (1.562 m)  Wt 114 lb 12.8 oz (52.1 kg)  BMI 21.34 kg/m2 Estimated body mass index is 21.34 kg/(m^2) as calculated from the following:    Height as of this encounter: 5' 1.5\" (1.562 m).    Weight as of this encounter: 114 lb 12.8 oz (52.1 kg).  Medication Reconciliation: complete    Vianney Garcia LPN  "

## 2018-10-16 NOTE — PROGRESS NOTES
SUBJECTIVE:   CC: Corinne G Hultman is an 68 year old woman who presents for preventive health visit.       Today's PHQ-2 Score:   PHQ-2 ( 1999 [employer]) 10/16/2018 7/11/2018   Q1: Little interest or pleasure in doing things 0 0   Q2: Feeling down, depressed or hopeless 0 0   PHQ-2 Score 0 0       Other problems:   She had cervical radiculopathy with right arm weakness in July. Neck pain improved after steroid epidural July 23. She did not see neurosurgery. Cancelled appointment as she was better. Strength has improved, but fine motor may be slightly affected still on the right. Tried gabapentin and felt dizzy.     Using T#3 daily, usually 2 pills every day. Has taken this for years. Taking Celebrex daily. Sometimes uses ibuprofen. We discussed avoiding concurrent NSAIDS previously and she knows that this can cause adverse effects.    Hx of radiation to neck for lymphoma in 2011. Has follow up with oncology soon. No recurrent symptoms.    Taking Fioricet daily for headaches. Tried Inderal for prophylaxis in the past, but caused fatigue. Headache is posterior and radiates to parietal region bilateral. Has nausea, photophobia, phonophobia with headache. Her mother had a brain aneurysm and had similar daily headaches. No numbness, tingling in face or extremities.    Takes Ambien 2.5 mg each night. She takes every night. Wakes up in the night if she does not take it. Was able to stop for a while this year and used Benadryl, but it did not work as well.    Past Medical History:   Diagnosis Date     Actinic keratosis     2/22/2013     Age-related osteoporosis without current pathological fracture     on Fosamax for 17 years--stopped 2011     Asymptomatic menopausal state     age 50     Closed displaced fracture of lateral malleolus of fibula     5/17/2010     Disorder of cartilage     No Comments Provided     Displaced fracture of lateral malleolus of unspecified fibula, initial encounter for open fracture type I or II      No Comments Provided     Dizziness and giddiness     No Comments Provided     Fracture of upper end of left humerus with routine healing     1/4/2018     Other cervical disc degeneration, unspecified cervical region     No Comments Provided     Other specified types of non-hodgkin lymphoma, extranodal and solid organ sites (H)     2011     Personal history of other malignant neoplasm of skin (CODE)     3/28/2017     Rheumatic fever without heart involvement     10+ years ago     Tension-type headache, not intractable     No Comments Provided        Past Surgical History:   Procedure Laterality Date     BIOPSY BREAST      mid 1990s,benign     COLONOSCOPY      age 55,normal     COLONOSCOPY      12/10/2015,lnormal--repeat 10 years     FUSION CERVICAL ANTERIOR ONE LEVEL  12/2012    C5-6 on left with discectomy     LAMINECTOMY LUMBAR ONE LEVEL      1984,L4-5     LAPAROSCOPIC TUBAL LIGATION      FOR ECTOPIC PREGNANCY     OTHER SURGICAL HISTORY      ,[id number],SKIN BIOPSY,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     OTHER SURGICAL HISTORY      1974,93282.0,[place] ECTOP PREG BY LAPAROSCOPE,Left,left tube and ovary removed     TONSILLECTOMY      1959          Current Outpatient Prescriptions   Medication Sig Dispense Refill     acetaminophen-codeine (TYLENOL #3) 300-30 MG per tablet Take 1-2 tablets by mouth every 6 hours as needed for severe pain 120 tablet 3     butalbital-acetaminophen-caffeine (FIORICET/ESGIC) -40 MG per tablet TAKE 1 TO 2 TABLETS BY MOUTH EVERY 4 TO 6 HOURS AS NEEDED FOR HEADACHE. MAX OF 6 DOSES PER DAY. 90 tablet 3     calcium carbonate (CALCIUM CARBONATE) 600 MG tablet Take by mouth 2 times daily       celecoxib (CELEBREX) 200 MG capsule Take 200 mg by mouth daily with food       cholecalciferol (D 2000) 2000 UNITS tablet Take 2,000 Units by mouth daily       Cranberry 500 MG CAPS Take by mouth daily       polyethylene glycol (MIRALAX/GLYCOLAX) Packet Take by mouth daily as needed Take  "1 cap by mouth daily if needed.       zolpidem (AMBIEN) 5 MG tablet TAKE 1/2-1 TABLET BY MOUTH EVERY DAY AT BEDTIME 30 tablet 5     gabapentin (NEURONTIN) 100 MG capsule Take 1 capsule (100 mg) by mouth 3 times daily (Patient not taking: Reported on 10/16/2018) 90 capsule 1     No Known Allergies    Social History   Substance Use Topics     Smoking status: Never Smoker     Smokeless tobacco: Never Used     Alcohol use 0.6 oz/week      Comment: Alcoholic Drinks/day: once weekly she will have a glass of beer or wine with dinner.        Reviewed and updated as needed this visit by clinical staff  Tobacco  Allergies  Meds  Med Hx  Surg Hx  Fam Hx  Soc Hx        Reviewed and updated as needed this visit by Provider          ROS:  General: Denies general constitutional problems  Eyes: Denies problems  Ears/Nose/Throat: Denies problems  Cardiovascular: Denies problems  Respiratory: Denies problems  Gastrointestinal: Denies problems  Genitourinary: Denies problems  Skin: Denies problems  Psychiatric: insomnia       OBJECTIVE:   /84 (BP Location: Right arm, Patient Position: Chair, Cuff Size: Adult Regular)  Pulse 88  Resp 16  Ht 5' 1.5\" (1.562 m)  Wt 114 lb 12.8 oz (52.1 kg)  BMI 21.34 kg/m2     EXAM:  General Appearance: Pleasant, alert, appropriate appearance for age. No acute distress  Eye Exam:  Normal external eye, conjunctiva, lids, cornea. FAITH.  Ear Exam: Both ears with cerumen.  OroPharynx Exam:  Dental hygiene adequate. Normal buccal mucose. Normal pharynx.  Neck Exam:  Supple, no masses or nodes.  Thyroid Exam: No nodules or enlargement.  Chest/Respiratory Exam: Normal chest wall and respirations. Clear to auscultation.  Cardiovascular Exam: Regular rate and rhythm. S1, S2, no murmur, click, gallop, or rubs.  Gastrointestinal Exam: Soft, non-tender, no masses or organomegaly.  Musculoskeletal Exam: Tender over posterior neck. Head is forward with lack of proper neck curvature.   Foot Exam: Left " and right foot: good pedal pulses.  Skin: no rash or abnormalities  Neurologic Exam: Nonfocal, symmetric DTRs, normal tone, coordination and no tremor. Strength may just slightly be reduced in right elbow flexion.  Psychiatric Exam: Alert and oriented - appropriate affect.    Results for orders placed or performed in visit on 10/16/18   Hepatitis C Screen Reflex to HCV RNA Quant and Genotype   Result Value Ref Range    Hepatitis C Antibody Nonreactive NR^Nonreactive   Comprehensive Metabolic Panel   Result Value Ref Range    Sodium 135 134 - 144 mmol/L    Potassium 4.2 3.5 - 5.1 mmol/L    Chloride 99 98 - 107 mmol/L    Carbon Dioxide 30 21 - 31 mmol/L    Anion Gap 6 3 - 14 mmol/L    Glucose 97 70 - 105 mg/dL    Urea Nitrogen 13 7 - 25 mg/dL    Creatinine 0.64 0.60 - 1.20 mg/dL    GFR Estimate >90 >60 mL/min/1.7m2    GFR Estimate If Black >90 >60 mL/min/1.7m2    Calcium 10.0 8.6 - 10.3 mg/dL    Bilirubin Total 0.6 0.3 - 1.0 mg/dL    Albumin 4.9 3.5 - 5.7 g/dL    Protein Total 7.2 6.4 - 8.9 g/dL    Alkaline Phosphatase 47 34 - 104 U/L    ALT 23 7 - 52 U/L    AST 25 13 - 39 U/L          ASSESSMENT/PLAN:       ICD-10-CM    1. Annual physical exam Z00.00    2. Chronic daily headache R51 MR Brain w/o & w Contrast     PHYSICAL THERAPY REFERRAL   3. Tension headache G44.209 butalbital-acetaminophen-caffeine (FIORICET/ESGIC) -40 MG per tablet     MR Brain w/o & w Contrast     PHYSICAL THERAPY REFERRAL   4. Right cervical radiculopathy M54.12 celecoxib (CELEBREX) 200 MG capsule     PHYSICAL THERAPY REFERRAL   5. History of follicular lymphoma Z85.79 PHYSICAL THERAPY REFERRAL   6. Primary insomnia F51.01 zolpidem (AMBIEN) 5 MG tablet   7. Need for pneumococcal vaccination Z23 GH IMM-  PNEUMOCOCCAL VACCINE,ADULT,SQ OR IM   8. Need for hepatitis C screening test Z11.59 Hepatitis C Screen Reflex to HCV RNA Quant and Genotype     Hepatitis C Screen Reflex to HCV RNA Quant and Genotype   9. Encounter for monitoring NSAID  "therapy Z51.81 Comprehensive Metabolic Panel    Z79.1 Comprehensive Metabolic Panel     Agree to refill on Fioricet, but we discussed that with T#3 and Ambien she has 3 controlled substances, which is not ideal and that reducing or eliminating those medications is ideal.     Ordered brain MRI given FHx of brain bleed, hx of lymphoma and ongoing non-controlled headaches.     Referral to PT to work on neck pain. This may reduce headache frequency and prevent return of the radicular symptoms.    Refilled Celebrex. Obtained normal CMP for monitoring of NSAIDS    Refilled Ambien, takes 1/2 pill. Can try and wean off by taking every other night and then weaning further. Refilled T#3, using about 2 per day. No concerns for aberrant use.     COUNSELING:  Reviewed preventive health counseling, as reflected in patient instructions       Regular exercise       Healthy diet/nutrition       Vision screening       Immunizations    Vaccinated for: Pneumococcal      Had Zostavax, will wait on Shingrix         Aspirin Prophylaxsis - not indicated       Osteoporosis Prevention/Bone Health - DEXA in 2015, plan to repeat in 2020       Colon cancer screening - normal in 2015       Consider Hep C screening for patients born between 1945 and 1965    Patient over age 50, mutual decision to screen reflected in health maintenance. Just had mammogram and elects for every 1 to 2 years.  Pertinent mammograms are reviewed under the imaging tab.    History of abnormal Pap smear: NO - age 65 - no further testing       BP Readings from Last 1 Encounters:   10/16/18 133/84     Estimated body mass index is 21.34 kg/(m^2) as calculated from the following:    Height as of this encounter: 5' 1.5\" (1.562 m).    Weight as of this encounter: 114 lb 12.8 oz (52.1 kg).    BP Screening:   Last 3 BP Readings:    BP Readings from Last 3 Encounters:   10/16/18 133/84   07/11/18 156/85   06/12/18 102/74       The following was recommended to the " patient:  Re-screen BP within a year and recommended lifestyle modifications       reports that she has never smoked. She has never used smokeless tobacco.        Jose Duffy MD  [facility]

## 2018-10-16 NOTE — PATIENT INSTRUCTIONS
Labs to MyChart  Referral to physical therapy  Ordered brain MRI  Work to reduce use of Ambien by slowly weaning.  If PT helps neck, then use less Tylenol #3 and Fioricet  Given Pneumovax for pneumonia  Follow-up by 6 months before refill on Tylenol #3

## 2018-10-17 LAB — HCV AB SERPL QL IA: NONREACTIVE

## 2018-10-25 ENCOUNTER — HOSPITAL ENCOUNTER (OUTPATIENT)
Dept: MRI IMAGING | Facility: OTHER | Age: 68
Discharge: HOME | End: 2018-10-25
Attending: FAMILY MEDICINE | Admitting: FAMILY MEDICINE
Payer: MEDICARE

## 2018-10-25 DIAGNOSIS — G44.209 TENSION HEADACHE: ICD-10-CM

## 2018-10-25 DIAGNOSIS — R51.9 CHRONIC DAILY HEADACHE: ICD-10-CM

## 2018-10-25 PROCEDURE — A9575 INJ GADOTERATE MEGLUMI 0.1ML: HCPCS | Performed by: FAMILY MEDICINE

## 2018-10-25 PROCEDURE — 70553 MRI BRAIN STEM W/O & W/DYE: CPT

## 2018-10-25 PROCEDURE — 25500064 ZZH RX 255 OP 636: Performed by: FAMILY MEDICINE

## 2018-10-25 RX ORDER — GADOTERATE MEGLUMINE 376.9 MG/ML
15 INJECTION INTRAVENOUS ONCE
Status: COMPLETED | OUTPATIENT
Start: 2018-10-25 | End: 2018-10-25

## 2018-10-25 RX ADMIN — GADOTERATE MEGLUMINE 10 ML: 376.9 INJECTION INTRAVENOUS at 10:17

## 2018-10-29 DIAGNOSIS — F51.01 PRIMARY INSOMNIA: ICD-10-CM

## 2018-10-31 RX ORDER — ZOLPIDEM TARTRATE 5 MG/1
TABLET ORAL
Qty: 30 TABLET | Refills: 0 | OUTPATIENT
Start: 2018-10-31

## 2018-10-31 NOTE — TELEPHONE ENCOUNTER
Redundant refill request refused: Too soon:    zolpidem (AMBIEN) 5 MG tablet 30 tablet 5 10/16/2018  No   Sig: TAKE 1/2-1 TABLET BY MOUTH EVERY DAY AT BEDTIME   Class: Local Print   Order: [id number]     [id number] - [place], [place] - [address]     Unable to complete prescription refill per RN Medication Refill Policy. Cece Johansen RN .............. 10/31/2018  9:57 AM

## 2018-11-02 ENCOUNTER — HOSPITAL ENCOUNTER (OUTPATIENT)
Dept: PHYSICAL THERAPY | Facility: OTHER | Age: 68
Setting detail: THERAPIES SERIES
End: 2018-11-02
Attending: FAMILY MEDICINE
Payer: MEDICARE

## 2018-11-02 DIAGNOSIS — Z85.72 HISTORY OF FOLLICULAR LYMPHOMA: ICD-10-CM

## 2018-11-02 DIAGNOSIS — R51.9 CHRONIC DAILY HEADACHE: ICD-10-CM

## 2018-11-02 DIAGNOSIS — M54.12 RIGHT CERVICAL RADICULOPATHY: ICD-10-CM

## 2018-11-02 DIAGNOSIS — G44.209 TENSION HEADACHE: ICD-10-CM

## 2018-11-02 PROCEDURE — 40000185 ZZHC STATISTIC PT OUTPT VISIT

## 2018-11-02 PROCEDURE — G8982 BODY POS GOAL STATUS: HCPCS | Mod: GP,CI

## 2018-11-02 PROCEDURE — 97161 PT EVAL LOW COMPLEX 20 MIN: CPT | Mod: GP

## 2018-11-02 PROCEDURE — G8981 BODY POS CURRENT STATUS: HCPCS | Mod: GP,CK

## 2018-11-02 PROCEDURE — 97140 MANUAL THERAPY 1/> REGIONS: CPT | Mod: GP

## 2018-11-02 PROCEDURE — 97110 THERAPEUTIC EXERCISES: CPT | Mod: GP

## 2018-11-02 NOTE — PROGRESS NOTES
" 11/02/18 0900   General Information   Type of Visit Initial OP Ortho PT Evaluation   Start of Care Date 11/02/18   Referring Physician Dr. Duffy   Patient/Family Goals Statement decrease neck pain and headaches   Orders Evaluate and Treat   Insurance Type Medicare;MA   Medical Diagnosis chronic daily headache, tension headache, right cervical radiculopathy, history of follicular lymphoma   Surgical/Medical history reviewed Yes   Precautions/Limitations other (see comments)  (left humerus fracture)   Body Part(s)   Body Part(s) Cervical Spine   Presentation and Etiology   Pertinent history of current problem (include personal factors and/or comorbidities that impact the POC) Hx of radiation to the right side of the neck in 2011 and the cervical fusion November 2012; C5-6 were fused anteriorly. Chronic neck pain and headaches. Takes celebrex and tylenol 3's daily. Describes a loss of range of motion, stiffness, achy and difficulty holding her head up. Headache is behind the ears and posterior head and parietal \"tight cap\" feeling. Back in July had radicular symptoms down the right arm with atrophy; did an injection which helped. Hx of dizziness that occurs about 3 days only about 2 times a year, no issues after surgery until recently but it's been maybe since spring.    Impairments A. Pain;D. Decreased ROM;E. Decreased flexibility;F. Decreased strength and endurance;N. Headaches;Q. Dizziness   Functional Limitations perform activities of daily living   Symptom Location right neck, back of the head   How/Where did it occur Other  (following radiation)   Chronicity Chronic   Pain rating (0-10 point scale) Best (/10);Worst (/10)   Best (/10) 2/10   Worst (/10) 8/10   Pain quality B. Dull;C. Aching   Frequency of pain/symptoms D. Other   Pain frequency comment frequent   Pain/symptoms are: Worse in the morning   Pain/symptoms exacerbated by G. Certain positions   Pain/symptoms eased by C. Rest;G. Heat;I. OTC " medication(s);H. Cold   Current / Previous Interventions   Diagnostic Tests: MRI   Current Level of Function   Patient role/employment history F. Retired   Fall Risk Screen   Fall screen completed by PT   Have you fallen 2 or more times in the past year? No   Have you fallen and had an injury in the past year? Yes   Fall screen comments tripped and broke humerus   Abuse Risk Screen   QUESTION TO PATIENT:  Has a member of your family or a partner(now or in the past) intimidated, hurt, manipulated, or controlled you in any way? no   QUESTION TO PATIENT: Do you feel safe going back to the place where you are living? yes   OBSERVATION: Is there reason to believe there has been maltreatment of a vulnerable adult (ie. Physical/Sexual/Emotional abuse, self neglect, lack of adequate food, shelter, medical care, or financial exploitation)? no   Cervical Spine   Posture forward head and shoulders   Cervical Flexion ROM 74   Cervical Extension ROM 32   Cervical Right Side Bending ROM 40   Cervical Left Side Bending ROM 24   Cervical Right Rotation ROM 62   Cervical Left Rotation ROM 55   Upper Trapezius Flexibility impaired   Levator Scapula Flexibility impaired   Scalene Flexibility impaired   Spurling Test negative   Neck Flexor Endurance Test (normal 39 sec) 5 seconds   Cervical Rotation/Lateral Flexion Test negative   Segmental Mobility-Cervical C2 ERS L; 1st rib R   Segmental Mobility-Thoracic T3 FRS R   Palpation moderate tenderness R>L throughout neck and medial scapular mms   UE Neural Tension (negative B)   Planned Therapy Interventions   Planned Therapy Interventions joint mobilization;manual therapy;neuromuscular re-education;ROM;strengthening;stretching   Planned Modality Interventions   Planned Modality Interventions Biofeedback;Cryotherapy;Electrical stimulation;Hot packs;TENS;Traction;Ultrasound   Clinical Impression   Criteria for Skilled Therapeutic Interventions Met yes, treatment indicated   PT Diagnosis  impaired spinal and soft tissue mobility, posture dysfunction, impaired neck and scapular strength   Influenced by the following impairments as above   Functional limitations due to impairments turning head, tipping forward to read   Clinical Presentation Stable/Uncomplicated   Clinical Decision Making (Complexity) Low complexity   Therapy Frequency 2 times/Week   Predicted Duration of Therapy Intervention (days/wks) 12 weeks   Risk & Benefits of therapy have been explained Yes   Patient, Family & other staff in agreement with plan of care Yes   Clinical Impression Comments Pt demonstrates impaired spinal and soft tissue mobility as well as neck and postural strength following radiation and cervical fusion years ago. Chronic headaches present as well.   Education Assessment   Preferred Learning Style Listening;Demonstration   Barriers to Learning No barriers   ORTHO GOALS   PT Ortho Eval Goals 1;2;3   Ortho Goal 1   Goal Identifier strength   Goal Description Pt will have improved neck strength to at least 15 seconds on the neck flexor endurance test to allow bed mobility and lifting her head independently.   Target Date 01/11/19   Ortho Goal 2   Goal Identifier ROM   Goal Description Pt will have improved rotation and side bend to within 5 degrees of the contralateral side for improved mobility with turning her head while driving.   Target Date 12/28/18   Ortho Goal 3   Goal Identifier HEP   Goal Description Pt will be independent and consistent with performance of a customized home exercise program for self management of symptoms.   Target Date 01/25/19   Total Evaluation Time   Total Evaluation Time 30

## 2018-11-02 NOTE — PROGRESS NOTES
[facility]          OUTPATIENT PHYSICAL THERAPY ORTHOPEDIC EVALUATION  PLAN OF TREATMENT FOR OUTPATIENT REHABILITATION  (COMPLETE FOR INITIAL CLAIMS ONLY)  Patient's Last Name, First Name, M.I.  YOB: 1950  Hultman,Corinne G    Provider s Name:  [facility]   Medical Record No.  [id number]   Start of Care Date:  11/02/18   Onset Date:      Type:     _X__PT   ___OT   ___SLP Medical Diagnosis:   Chronic daily headache, tension headache, right cervical radiculopathy, history of follicular lymphoma     PT Diagnosis:  impaired spinal and soft tissue mobility, posture dysfunction, impaired neck and scapular strength   Visits from SOC:  1      _________________________________________________________________________________  Plan of Treatment/Functional Goals:  joint mobilization, manual therapy, neuromuscular re-education, ROM, strengthening, stretching     Biofeedback, Cryotherapy, Electrical stimulation, Hot packs, TENS, Traction, Ultrasound     Goals  Goal Identifier: strength  Goal Description: Pt will have improved neck strength to at least 15 seconds on the neck flexor endurance test to allow bed mobility and lifting her head independently.  Target Date: 01/11/19    Goal Identifier: ROM  Goal Description: Pt will have improved rotation and side bend to within 5 degrees of the contralateral side for improved mobility with turning her head while driving.  Target Date: 12/28/18    Goal Identifier: HEP  Goal Description: Pt will be independent and consistent with performance of a customized home exercise program for self management of symptoms.  Target Date: 01/25/19            Therapy Frequency:  2 times/Week  Predicted Duration of Therapy Intervention:  12 weeks    Lucy Tello PT                 I CERTIFY THE NEED FOR THESE SERVICES FURNISHED UNDER        THIS PLAN OF TREATMENT AND WHILE UNDER MY CARE     (Physician co-signature of this document indicates  review and certification of the therapy plan).                       Certification Date From:    11/2/2018   Certification Date To:   1/25/2019    Referring Provider:  Dr. Duffy    Initial Assessment        See Epic Evaluation Start of Care Date: 11/02/18

## 2018-11-05 ENCOUNTER — HOSPITAL ENCOUNTER (OUTPATIENT)
Dept: PHYSICAL THERAPY | Facility: OTHER | Age: 68
Setting detail: THERAPIES SERIES
End: 2018-11-05
Attending: FAMILY MEDICINE
Payer: MEDICARE

## 2018-11-05 PROCEDURE — 97110 THERAPEUTIC EXERCISES: CPT | Mod: GP

## 2018-11-05 PROCEDURE — 97140 MANUAL THERAPY 1/> REGIONS: CPT | Mod: GP

## 2018-11-05 PROCEDURE — 40000185 ZZHC STATISTIC PT OUTPT VISIT

## 2018-11-08 ENCOUNTER — HOSPITAL ENCOUNTER (OUTPATIENT)
Dept: PHYSICAL THERAPY | Facility: OTHER | Age: 68
Setting detail: THERAPIES SERIES
End: 2018-11-08
Attending: FAMILY MEDICINE
Payer: MEDICARE

## 2018-11-08 PROCEDURE — 97110 THERAPEUTIC EXERCISES: CPT | Mod: GP

## 2018-11-08 PROCEDURE — 97140 MANUAL THERAPY 1/> REGIONS: CPT | Mod: GP

## 2018-11-08 PROCEDURE — 40000185 ZZHC STATISTIC PT OUTPT VISIT

## 2018-11-12 ENCOUNTER — HOSPITAL ENCOUNTER (OUTPATIENT)
Dept: PHYSICAL THERAPY | Facility: OTHER | Age: 68
Setting detail: THERAPIES SERIES
End: 2018-11-12
Attending: FAMILY MEDICINE
Payer: MEDICARE

## 2018-11-12 PROCEDURE — 97110 THERAPEUTIC EXERCISES: CPT | Mod: GP

## 2018-11-12 PROCEDURE — 40000185 ZZHC STATISTIC PT OUTPT VISIT

## 2018-11-12 PROCEDURE — 97140 MANUAL THERAPY 1/> REGIONS: CPT | Mod: GP

## 2018-11-15 ENCOUNTER — HOSPITAL ENCOUNTER (OUTPATIENT)
Dept: PHYSICAL THERAPY | Facility: OTHER | Age: 68
Setting detail: THERAPIES SERIES
End: 2018-11-15
Attending: FAMILY MEDICINE
Payer: MEDICARE

## 2018-11-15 PROCEDURE — 97012 MECHANICAL TRACTION THERAPY: CPT | Mod: GP

## 2018-11-15 PROCEDURE — 40000185 ZZHC STATISTIC PT OUTPT VISIT

## 2018-11-15 PROCEDURE — 97140 MANUAL THERAPY 1/> REGIONS: CPT | Mod: GP

## 2018-11-19 ENCOUNTER — HOSPITAL ENCOUNTER (OUTPATIENT)
Dept: PHYSICAL THERAPY | Facility: OTHER | Age: 68
Setting detail: THERAPIES SERIES
End: 2018-11-19
Attending: FAMILY MEDICINE
Payer: MEDICARE

## 2018-11-19 PROCEDURE — 97012 MECHANICAL TRACTION THERAPY: CPT | Mod: GP

## 2018-11-19 PROCEDURE — 97140 MANUAL THERAPY 1/> REGIONS: CPT | Mod: GP,XU

## 2018-11-19 PROCEDURE — 40000185 ZZHC STATISTIC PT OUTPT VISIT

## 2018-11-26 ENCOUNTER — HOSPITAL ENCOUNTER (OUTPATIENT)
Dept: PHYSICAL THERAPY | Facility: OTHER | Age: 68
Setting detail: THERAPIES SERIES
End: 2018-11-26
Attending: FAMILY MEDICINE
Payer: MEDICARE

## 2018-11-26 PROCEDURE — 97140 MANUAL THERAPY 1/> REGIONS: CPT | Mod: GP

## 2018-11-26 PROCEDURE — 97012 MECHANICAL TRACTION THERAPY: CPT | Mod: GP

## 2018-11-26 PROCEDURE — 40000185 ZZHC STATISTIC PT OUTPT VISIT

## 2018-11-29 ENCOUNTER — HOSPITAL ENCOUNTER (OUTPATIENT)
Dept: PHYSICAL THERAPY | Facility: OTHER | Age: 68
Setting detail: THERAPIES SERIES
End: 2018-11-29
Attending: FAMILY MEDICINE
Payer: MEDICARE

## 2018-11-29 PROCEDURE — 40000185 ZZHC STATISTIC PT OUTPT VISIT

## 2018-11-29 PROCEDURE — 97140 MANUAL THERAPY 1/> REGIONS: CPT | Mod: GP

## 2018-11-29 PROCEDURE — 97012 MECHANICAL TRACTION THERAPY: CPT | Mod: GP

## 2018-11-29 PROCEDURE — 97110 THERAPEUTIC EXERCISES: CPT | Mod: GP

## 2018-12-03 ENCOUNTER — HOSPITAL ENCOUNTER (OUTPATIENT)
Dept: PHYSICAL THERAPY | Facility: OTHER | Age: 68
Setting detail: THERAPIES SERIES
End: 2018-12-03
Attending: FAMILY MEDICINE
Payer: MEDICARE

## 2018-12-03 PROCEDURE — 97110 THERAPEUTIC EXERCISES: CPT | Mod: GP

## 2018-12-03 PROCEDURE — 97140 MANUAL THERAPY 1/> REGIONS: CPT | Mod: GP,XU

## 2018-12-03 PROCEDURE — 40000185 ZZHC STATISTIC PT OUTPT VISIT

## 2018-12-03 PROCEDURE — 97012 MECHANICAL TRACTION THERAPY: CPT | Mod: GP

## 2018-12-06 ENCOUNTER — HOSPITAL ENCOUNTER (OUTPATIENT)
Dept: PHYSICAL THERAPY | Facility: OTHER | Age: 68
Setting detail: THERAPIES SERIES
End: 2018-12-06
Attending: FAMILY MEDICINE
Payer: MEDICARE

## 2018-12-06 PROCEDURE — G8982 BODY POS GOAL STATUS: HCPCS | Mod: GP,CI

## 2018-12-06 PROCEDURE — 97110 THERAPEUTIC EXERCISES: CPT | Mod: GP

## 2018-12-06 PROCEDURE — 97140 MANUAL THERAPY 1/> REGIONS: CPT | Mod: GP

## 2018-12-06 PROCEDURE — G8981 BODY POS CURRENT STATUS: HCPCS | Mod: GP,CJ

## 2018-12-06 PROCEDURE — 97012 MECHANICAL TRACTION THERAPY: CPT | Mod: GP

## 2018-12-06 PROCEDURE — 40000185 ZZHC STATISTIC PT OUTPT VISIT

## 2018-12-06 NOTE — PROGRESS NOTES
Outpatient Physical Therapy Progress Note     Patient: Corinne G Hultman  : 1950    Beginning/End Dates of Reporting Period:  18 to 2018    Referring Provider: Jose Leon Diagnosis: impaired spinal and soft tissue mobility, posture dysfunction, impaired neck and scapular strength     Client Self Report: Pt denies pain today, made the bed without a problems today. One headache for a couple hours yesterday, but likely stress related. Posture is still tough to keep herself  upright and neck does get tired by the end of the day. Overall 60% better and motion is much improved.     Objective Measurements:  Objective Measure: segmental mobility  Details: good, generally stiffer and tender at TPs w/ rotation to the right  Objective Measure: neck flexor endurance  Details: 17 seconds  Objective Measure: cervical rotation  Details: L: 54 R: 50  Objective Measure: side bending  Details: L: 38 R: 36            Outcome Measures (most recent score):  Neck flexor endurance: 17 seconds (39 seconds is norm)    Goals:  Goal Identifier strength   Goal Description Pt will have improved neck strength to at least 15 seconds on the neck flexor endurance test to allow bed mobility and lifting her head independently.   Target Date 19   Date Met   (60% met. Time goal met, still difficult to lift head supine)   Progress:     Goal Identifier ROM   Goal Description Pt will have improved rotation and side bend to within 5 degrees of the contralateral side for improved mobility with turning her head while driving.   Target Date 18   Date Met  18   Progress:     Goal Identifier HEP   Goal Description Pt will be independent and consistent with performance of a customized home exercise program for self management of symptoms.   Target Date 19   Date Met   (75% met)   Progress:       Progress Toward Goals:   Progress this reporting period: Improved spinal and soft tissue mobility, persisting  strength impairments and postural endurance.          Plan:  Continue therapy per current plan of care.    Discharge:  No

## 2018-12-13 ENCOUNTER — HOSPITAL ENCOUNTER (OUTPATIENT)
Dept: PHYSICAL THERAPY | Facility: OTHER | Age: 68
Setting detail: THERAPIES SERIES
End: 2018-12-13
Attending: FAMILY MEDICINE
Payer: MEDICARE

## 2018-12-13 PROCEDURE — 97012 MECHANICAL TRACTION THERAPY: CPT | Mod: GP

## 2018-12-13 PROCEDURE — 97110 THERAPEUTIC EXERCISES: CPT | Mod: GP

## 2018-12-13 PROCEDURE — 97140 MANUAL THERAPY 1/> REGIONS: CPT | Mod: GP

## 2018-12-18 ENCOUNTER — HOSPITAL ENCOUNTER (OUTPATIENT)
Dept: PHYSICAL THERAPY | Facility: OTHER | Age: 68
Setting detail: THERAPIES SERIES
End: 2018-12-18
Attending: FAMILY MEDICINE
Payer: MEDICARE

## 2018-12-18 PROCEDURE — 97110 THERAPEUTIC EXERCISES: CPT | Mod: GP

## 2018-12-18 PROCEDURE — 97140 MANUAL THERAPY 1/> REGIONS: CPT | Mod: GP

## 2018-12-20 ENCOUNTER — HOSPITAL ENCOUNTER (OUTPATIENT)
Dept: PHYSICAL THERAPY | Facility: OTHER | Age: 68
Setting detail: THERAPIES SERIES
End: 2018-12-20
Attending: FAMILY MEDICINE
Payer: MEDICARE

## 2018-12-20 PROCEDURE — 97140 MANUAL THERAPY 1/> REGIONS: CPT | Mod: GP

## 2018-12-20 PROCEDURE — 97110 THERAPEUTIC EXERCISES: CPT | Mod: GP

## 2019-01-08 ENCOUNTER — HOSPITAL ENCOUNTER (OUTPATIENT)
Dept: PHYSICAL THERAPY | Facility: OTHER | Age: 69
Setting detail: THERAPIES SERIES
End: 2019-01-08
Attending: FAMILY MEDICINE
Payer: MEDICARE

## 2019-01-08 PROCEDURE — 97140 MANUAL THERAPY 1/> REGIONS: CPT | Mod: GP

## 2019-01-08 PROCEDURE — 97110 THERAPEUTIC EXERCISES: CPT | Mod: GP

## 2019-01-09 NOTE — PROGRESS NOTES
Outpatient Physical Therapy Discharge Note     Patient: Corinne G Hultman  : 1950    Beginning/End Dates of Reporting Period:  18 to 2019    Referring Provider: Dr. Duffy    Therapy Diagnosis: impaired spinal and soft tissue mobility, posture dysfunction, impaired neck and scapular strength       Client Self Report: Corinne reports things has been going well; no increase in headaches. Lifting her head from laying down has improved as well, she is amazed. Denies neck pain or headache currently.     Objective Measurements:  Objective Measure: segmental mobility  Details: rotation C3 restriction, right 1st rib  Objective Measure: cervical rotation  Details: 55 degrees bilaterally  Objective Measure: neck flexor endurance test  Details: 20 seconds                        Outcome Measures (most recent score):  Neck flexor endurance 20 seconds (39 is norm)    Goals:  Goal Identifier strength   Goal Description Pt will have improved neck strength to at least 15 seconds on the neck flexor endurance test to allow bed mobility and lifting her head independently.   Target Date 19   Date Met  19   Progress:     Goal Identifier ROM   Goal Description Pt will have improved rotation and side bend to within 5 degrees of the contralateral side for improved mobility with turning her head while driving.   Target Date 18   Date Met  18   Progress:     Goal Identifier HEP   Goal Description Pt will be independent and consistent with performance of a customized home exercise program for self management of symptoms.   Target Date 19   Date Met  19   Progress:       Progress Toward Goals:   Progress this reporting period: Pt demonstrates improved range of motion to be equal bilaterally, increased neck flexor strength for lifting her head, as well as improved neck mobility and posture. She reports decreased headaches and ability to self manage with stretching at home.            Plan:  Discharge from therapy.    Discharge:    Reason for Discharge: Patient has met all goals.    Equipment Issued: n/a    Discharge Plan: Patient to continue home program.

## 2019-05-02 ENCOUNTER — MYC MEDICAL ADVICE (OUTPATIENT)
Dept: FAMILY MEDICINE | Facility: OTHER | Age: 69
End: 2019-05-02

## 2019-05-20 ENCOUNTER — OFFICE VISIT (OUTPATIENT)
Dept: FAMILY MEDICINE | Facility: OTHER | Age: 69
End: 2019-05-20
Attending: FAMILY MEDICINE
Payer: COMMERCIAL

## 2019-05-20 VITALS
SYSTOLIC BLOOD PRESSURE: 124 MMHG | RESPIRATION RATE: 16 BRPM | DIASTOLIC BLOOD PRESSURE: 80 MMHG | TEMPERATURE: 97.2 F | BODY MASS INDEX: 22.42 KG/M2 | WEIGHT: 120.6 LBS | HEART RATE: 78 BPM

## 2019-05-20 DIAGNOSIS — M54.12 CERVICAL RADICULOPATHY: ICD-10-CM

## 2019-05-20 DIAGNOSIS — M54.12 RIGHT CERVICAL RADICULOPATHY: ICD-10-CM

## 2019-05-20 DIAGNOSIS — G44.209 TENSION HEADACHE: ICD-10-CM

## 2019-05-20 PROCEDURE — 99214 OFFICE O/P EST MOD 30 MIN: CPT | Performed by: FAMILY MEDICINE

## 2019-05-20 PROCEDURE — G0463 HOSPITAL OUTPT CLINIC VISIT: HCPCS

## 2019-05-20 RX ORDER — CELECOXIB 200 MG/1
200 CAPSULE ORAL
Qty: 90 CAPSULE | Refills: 3 | Status: SHIPPED | OUTPATIENT
Start: 2019-05-20 | End: 2019-11-08

## 2019-05-20 RX ORDER — BUTALBITAL, ACETAMINOPHEN AND CAFFEINE 50; 325; 40 MG/1; MG/1; MG/1
1 TABLET ORAL DAILY PRN
Qty: 90 TABLET | Refills: 1 | Status: SHIPPED | OUTPATIENT
Start: 2019-05-20 | End: 2019-11-08

## 2019-05-20 ASSESSMENT — PAIN SCALES - GENERAL: PAINLEVEL: MILD PAIN (2)

## 2019-05-20 NOTE — PROGRESS NOTES
Nursing Notes:   Vianney Garcia LPN  5/20/2019  9:05 AM  Sign at exiting of workspace  Pt presents to clinic today for medication management.      Medication Reconciliation: complete  Vianney Garcia LPN      SUBJECTIVE:  69 year old female presents to follow up on cervical radiculopathy, tension headaches, and insomnia.    She had cervical radiculopathy with right arm weakness in July 2018. Neck pain improved after LISA. She went to PT and had great improvement in symptoms.  Stopped Celebrex, but then had more knee and ankle pain. Resumed Celebrex and pain improved. Still takes ibuprofen once in a while and we again discussed harms of excess NSAID use.  Using Tylenol #3 a couple per day on average  Gabapentin caused dizziness.     Still on Fioricet a few days per week. Not taking daily. Using some a couple times daily and so average is about daily. She took Inderal in the past, but it caused fatigue. However, her headaches sound more like tension headache.    Able to stop Ambien. Sleep is okay. Once in a while with bad insomnia, but then able to sleep the next night.    Had follow up with Dr Cuenca recently in Feb 2019 with normal labs.     REVIEW OF SYSTEMS:    Pertinent items are noted in HPI.    Current Outpatient Medications   Medication Sig Dispense Refill     acetaminophen-codeine (TYLENOL #3) 300-30 MG tablet Take 1-2 tablets by mouth daily as needed for severe pain 120 tablet 2     butalbital-acetaminophen-caffeine (FIORICET/ESGIC) -40 MG per tablet Take 1 tablet by mouth daily as needed for headaches 90 tablet 3     calcium carbonate (CALCIUM CARBONATE) 600 MG tablet Take by mouth 2 times daily       celecoxib (CELEBREX) 200 MG capsule Take 1 capsule (200 mg) by mouth daily with food 90 capsule 3     cholecalciferol (D 2000) 2000 UNITS tablet Take 2,000 Units by mouth daily       Cranberry 500 MG CAPS Take by mouth daily       polyethylene glycol (MIRALAX/GLYCOLAX) Packet Take by mouth daily as  needed Take 1 cap by mouth daily if needed.       zolpidem (AMBIEN) 5 MG tablet TAKE 1/2-1 TABLET BY MOUTH EVERY DAY AT BEDTIME (Patient not taking: Reported on 5/20/2019) 30 tablet 5     No Known Allergies    OBJECTIVE:  /80   Pulse 78   Temp 97.2  F (36.2  C) (Tympanic)   Resp 16   Wt 54.7 kg (120 lb 9.6 oz)   BMI 22.42 kg/m      EXAM:  General Appearance: Alert. No acute distress  Musculoskeletal: Tender in posterior neck  Neurological: Strength symmetric upper extremities   Extremities: No lower extremity edema.  Psychiatric: Normal affect and mentation        ASSESSMENT/PLAN:    ICD-10-CM    1. Cervical radiculopathy M54.12 acetaminophen-codeine (TYLENOL #3) 300-30 MG tablet   2. Tension headache G44.209 butalbital-acetaminophen-caffeine (FIORICET/ESGIC) -40 MG tablet   3. Right cervical radiculopathy M54.12 celecoxib (CELEBREX) 200 MG capsule       Discussed multiple controlled substances. She has stopped Ambien. Ideally would reduce Fioricet use. T#3 can be constipating, other opioids could be better. Cymbalta or Effexor could help her pain and headaches, but she is reluctant to change. Will think about that option.    Refilled T#3 for up to 2 per day. Refilled Fioricet for up to daily use. Reviewed .     Refilled Celebrex. On highest dose for age. Recent labs WNL. She needs to avoid other NSAIDs with Celebrex.    F/U 6 months    Greater than 50% of this 29 minute appointment spent on counseling     Jose Duffy MD    This document was prepared using a combination of typing and voice generated software.  While every attempt was made for accuracy, spelling and grammatical errors may exist.

## 2019-05-20 NOTE — PATIENT INSTRUCTIONS
Refilled Tylenol #3 and Fioricet  Follow-up in 6 months    Consider using Effexor or Cymbalta to help with headache and neck pain

## 2019-05-20 NOTE — NURSING NOTE
Pt presents to clinic today for medication management.      Medication Reconciliation: complete  Vianney Garcia LPN

## 2019-05-28 ENCOUNTER — MYC REFILL (OUTPATIENT)
Dept: FAMILY MEDICINE | Facility: OTHER | Age: 69
End: 2019-05-28

## 2019-05-28 DIAGNOSIS — M54.12 CERVICAL RADICULOPATHY: ICD-10-CM

## 2019-05-29 NOTE — TELEPHONE ENCOUNTER
Redundant Refill Request for Tylenol #3 refused;    Outpatient Medication Detail      Disp Refills Start End ROSA   acetaminophen-codeine (TYLENOL #3) 300-30 MG tablet 120 tablet 2 5/20/2019  No   Sig - Route: Take 1-2 tablets by mouth daily as needed for severe pain - Oral   Class: Local Print   Order: [id number]   Outpatient Morphine Equivalent Daily Dose (MEDD)     5/20/19 and after   4.5-9 mg MEDD   Order Name Dose Route Frequency Maximum MEDD    acetaminophen-codeine (TYLENOL #3) 300-30 MG tablet 1-2 tablet Oral DAILY PRN 4.5-9 mg MEDD   Total Potential Daily Morphine Equivalence 4.5-9 mg MEDD   Calculation Information                Printout Tracking     External Result Report   Pharmacy     [facility] - [place], [place] -  [address]     See [employer] message to patient. Writer will refuse Rx request in this encounter as well.    Unable to complete prescription refill per RN Medication Refill Policy. Jose Arvizu 5/29/2019 3:52 PM

## 2019-07-25 DIAGNOSIS — M54.12 CERVICAL RADICULOPATHY: Primary | ICD-10-CM

## 2019-07-29 NOTE — TELEPHONE ENCOUNTER
[facility] sent Rx request for the following:      ACETAMINOPHEN/COD #3 (300/30MG) TAB  Sig: Take 1-2 tablets by mouth daily as needed for severe pain  Last Prescription Date:   5/20/19  Last Fill Qty/Refills:         120, R-2    Last Office Visit:              5/20/19  Future Office visit:           None.  Routing refill request to provider for review/approval because:  Drug not on the [facility] or [facility] refill protocol or controlled substance    Per LOV Note:  Refilled T#3 for up to 2 per day. F/U 6 months.    On July 1, 2019, [place] Legislature made change to narcotic pain medication prescriptions, where they are only valid for 30 days from the date written. It will be ineligible to fill after 30 days. Pt must now be seen face to face every 30 days for refills of this medication.    Attempt made to reach Pt. Left message on machine to call back. Cece Johansen RN .............. 7/30/2019  8:56 AM

## 2019-07-30 NOTE — TELEPHONE ENCOUNTER
Refilled. Will need to be seen before next refill in order to work out a plan for opioids. Please have her make appointment.

## 2019-07-30 NOTE — TELEPHONE ENCOUNTER
Patient returned call and her last name and  were verified. Pt notified that new law requires refills of this medication be filled within 30 days from the last fill or they will be invalid.     Pt is needing new prescription, per 19 fill.     Will route to PCP for consideration.     Ok to leave detailed message with questions. Cece Johansen RN .............. 2019  9:55 AM

## 2019-08-02 NOTE — TELEPHONE ENCOUNTER
After verifying pts name and date of birth with pt, pt notified of Dr. Duffy message below.  Vianney Garcia

## 2019-11-08 ENCOUNTER — OFFICE VISIT (OUTPATIENT)
Dept: FAMILY MEDICINE | Facility: OTHER | Age: 69
End: 2019-11-08
Attending: FAMILY MEDICINE
Payer: COMMERCIAL

## 2019-11-08 VITALS
TEMPERATURE: 97.2 F | BODY MASS INDEX: 22.96 KG/M2 | SYSTOLIC BLOOD PRESSURE: 134 MMHG | RESPIRATION RATE: 18 BRPM | WEIGHT: 121.6 LBS | OXYGEN SATURATION: 98 % | DIASTOLIC BLOOD PRESSURE: 78 MMHG | HEIGHT: 61 IN | HEART RATE: 82 BPM

## 2019-11-08 DIAGNOSIS — Z00.00 ENCOUNTER FOR MEDICARE ANNUAL WELLNESS EXAM: Primary | ICD-10-CM

## 2019-11-08 DIAGNOSIS — L98.9 SKIN LESION OF RIGHT LEG: ICD-10-CM

## 2019-11-08 DIAGNOSIS — Z91.89 FRAMINGHAM CARDIAC RISK 10-20% IN NEXT 10 YEARS: ICD-10-CM

## 2019-11-08 DIAGNOSIS — Z23 NEED FOR PROPHYLACTIC VACCINATION AND INOCULATION AGAINST INFLUENZA: ICD-10-CM

## 2019-11-08 DIAGNOSIS — M54.12 RIGHT CERVICAL RADICULOPATHY: ICD-10-CM

## 2019-11-08 DIAGNOSIS — Z82.49 FAMILY HISTORY OF ISCHEMIC HEART DISEASE: ICD-10-CM

## 2019-11-08 PROCEDURE — 90471 IMMUNIZATION ADMIN: CPT

## 2019-11-08 PROCEDURE — 99212 OFFICE O/P EST SF 10 MIN: CPT | Mod: 25 | Performed by: FAMILY MEDICINE

## 2019-11-08 PROCEDURE — G0439 PPPS, SUBSEQ VISIT: HCPCS | Performed by: FAMILY MEDICINE

## 2019-11-08 PROCEDURE — G0463 HOSPITAL OUTPT CLINIC VISIT: HCPCS

## 2019-11-08 PROCEDURE — 90662 IIV NO PRSV INCREASED AG IM: CPT

## 2019-11-08 PROCEDURE — G0008 ADMIN INFLUENZA VIRUS VAC: HCPCS

## 2019-11-08 PROCEDURE — G0463 HOSPITAL OUTPT CLINIC VISIT: HCPCS | Mod: 25

## 2019-11-08 RX ORDER — CELECOXIB 200 MG/1
200 CAPSULE ORAL
Qty: 90 CAPSULE | Refills: 3 | Status: SHIPPED | OUTPATIENT
Start: 2019-11-08 | End: 2020-11-09

## 2019-11-08 ASSESSMENT — PAIN SCALES - GENERAL: PAINLEVEL: MILD PAIN (2)

## 2019-11-08 ASSESSMENT — MIFFLIN-ST. JEOR: SCORE: 1017.91

## 2019-11-08 NOTE — PATIENT INSTRUCTIONS
Referral to general surgery to biopsy skin lesion    Ordered CT scan of the heart for calcium buildup. If elevated, then take a statin    Shingrix for shingles maybe next year at a pharmacy      Patient Education   Personalized Prevention Plan  You are due for the preventive services outlined below.  Your care team is available to assist you in scheduling these services.  If you have already completed any of these items, please share that information with your care team to update in your medical record.  Health Maintenance Due   Topic Date Due     Zoster (Shingles) Vaccine (2 of 3) 09/17/2012     Flu Vaccine (1) 09/01/2019     Annual Wellness Visit  10/16/2019

## 2019-11-08 NOTE — PROGRESS NOTES
"SUBJECTIVE:   Corinne G Hultman is a 69 year old female who presents for Preventive Visit.    Are you in the first 12 months of your Medicare Part B coverage?  No    Physical Health:    In general, how would you rate your overall physical health? good    Outside of work, how many days during the week do you exercise? 6-7 days/week    Outside of work, approximately how many minutes a day do you exercise?45-60 minutes    If you drink alcohol do you typically have >3 drinks per day or >7 drinks per week? No    Do you usually eat at least 4 servings of fruit and vegetables a day, include whole grains & fiber and avoid regularly eating high fat or \"junk\" foods? NO    Do you have any problems taking medications regularly?  No    Do you have any side effects from medications? none    Needs assistance for the following daily activities: no assistance needed    Which of the following safety concerns are present in your home?  none identified     Hearing impairment: No    In the past 6 months, have you been bothered by leaking of urine? no    Mental Health:    In general, how would you rate your overall mental or emotional health? excellent  PHQ-2 Score: 0    Do you feel safe in your environment? Yes    Have you ever done Advance Care Planning? (For example, a Health Directive, POLST, or a discussion with a medical provider or your loved ones about your wishes): Yes, patient states has an Advance Care Planning document and will bring a copy to the clinic.    Additional concerns to address?  No    Fall risk:  Fallen 2 or more times in the past year?: No  Any fall with injury in the past year?: No    Cognitive Screenin) Repeat 3 items (Leader, Season, Table)    2) Clock draw: NORMAL  3) 3 item recall: Recalls 3 objects  Results: 3 items recalled: COGNITIVE IMPAIRMENT LESS LIKELY    Mini-CogTM Copyright S Kaelyn. Licensed by the author for use in [facility]; reprinted with permission (corrine@.[facility]). All rights " reserved.      Do you have sleep apnea, excessive snoring or daytime drowsiness?: no      PROBLEMS TO ADD ON...     She had cervical radiculopathy with right arm weakness in July 2018. Neck pain improved after LISA. She went to PT and had great improvement in symptoms.  Tried to stop Celebrex, but had more knee and ankle pain.  Has reduced Tylenol #3 to a couple times per week. Reluctant to use Cymbalta or Effexor as gabapentin as caused dizziness. Stopped Fioricet.    Concerned about a skin lesion on right lower leg.  She had this biopsied by Dr. Olson in 2014 and it showed some stasis changes. Has grown since that time. Still has a nonhealing central ulceration.      Reviewed and updated as needed this visit by clinical staff  Tobacco  Allergies  Meds  Med Hx  Surg Hx  Fam Hx  Soc Hx        Reviewed and updated as needed this visit by Provider  Tobacco  Allergies  Meds  Problems  Med Hx  Surg Hx  Fam Hx        Social History     Tobacco Use     Smoking status: Never Smoker     Smokeless tobacco: Never Used   Substance Use Topics     Alcohol use: Yes     Alcohol/week: 1.0 standard drinks     Comment: Alcoholic Drinks/day: once weekly she will have a glass of beer or wine with dinner.                           Current providers sharing in care for this patient include:   Patient Care Team:  Jose Duffy MD as PCP - General (Family Practice)  Jose Duffy MD as Assigned PCP    The following health maintenance items are reviewed in Epic and correct as of today:  Health Maintenance   Topic Date Due     ZOSTER IMMUNIZATION (2 of 3) 09/17/2012     INFLUENZA VACCINE (1) 09/01/2019     MEDICARE ANNUAL WELLNESS VISIT  10/16/2019     FALL RISK ASSESSMENT  05/20/2020     LIPID  09/17/2020     MAMMO SCREENING  10/12/2020     DTAP/TDAP/TD IMMUNIZATION (2 - Td) 07/18/2021     ADVANCE CARE PLANNING  04/27/2023     COLONOSCOPY  12/10/2025     DEXA  Completed     HEPATITIS C SCREENING  Completed     PHQ-2   Completed     PNEUMOCOCCAL IMMUNIZATION 65+ HIGH/HIGHEST RISK  Completed     IPV IMMUNIZATION  Aged Out     MENINGITIS IMMUNIZATION  Aged Out     Patient Active Problem List   Diagnosis     Closed fracture of proximal end of left humerus with routine healing     History of follicular lymphoma     Right cervical radiculopathy     Tension headache     Hyperlipidemia     Disorder of bone and cartilage     Skin lesion of chest wall     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)     Past Surgical History:   Procedure Laterality Date     BIOPSY BREAST      mid ,benign     COLONOSCOPY      age 55,normal 2005     COLONOSCOPY  12/10/2015    12/10/2015,lnormal--repeat 10 years, 12/10/2025     FUSION CERVICAL ANTERIOR ONE LEVEL  2012    C5-6 on left with discectomy     LAMINECTOMY LUMBAR ONE LEVEL      ,L4-5     LAPAROSCOPIC TUBAL LIGATION      FOR ECTOPIC PREGNANCY     OTHER SURGICAL HISTORY      ,OAN642,SKIN BIOPSY,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     OTHER SURGICAL HISTORY      ,31994.0,OR TX ECTOP PREG BY LAPAROSCOPE,Left,left tube and ovary removed     TONSILLECTOMY             Social History     Tobacco Use     Smoking status: Never Smoker     Smokeless tobacco: Never Used   Substance Use Topics     Alcohol use: Yes     Alcohol/week: 1.0 standard drinks     Comment: Alcoholic Drinks/day: once weekly she will have a glass of beer or wine with dinner.     Family History   Problem Relation Age of Onset     Heart Disease Child      Heart Disease Mother         Heart Disease, after cardiac bypass, age 71     Heart Disease Father         Heart Disease,congestive heart failure     Breast Cancer Sister      Hypertension Brother      Diabetes Brother      Breast Cancer Niece      Hypertension Sister      Hypertension Sister      Hypertension Sister      Emphysema Sister      No Known Problems Brother      Heart Disease Brother      Colon Cancer Paternal Half-Brother      "     Current Outpatient Medications   Medication Sig Dispense Refill     acetaminophen-codeine (TYLENOL #3) 300-30 MG tablet Take 1-2 tablets by mouth daily as needed for severe pain 150 tablet 0     calcium carbonate (CALCIUM CARBONATE) 600 MG tablet Take by mouth 2 times daily       celecoxib (CELEBREX) 200 MG capsule Take 1 capsule (200 mg) by mouth daily with food 90 capsule 3     cholecalciferol (D 2000) 2000 UNITS tablet Take 2,000 Units by mouth daily       No Known Allergies    ROS:  General: Denies general constitutional problems  Eyes: Denies problems  Ears/Nose/Throat: Denies problems  Cardiovascular: Denies problems  Respiratory: Denies problems  Gastrointestinal: Denies problems  Genitourinary: Denies problems  Musculoskeletal: see above  Skin: see above  Neurologic: see above  Psychiatric: Denies problems      OBJECTIVE:   /78   Pulse 82   Temp 97.2  F (36.2  C) (Temporal)   Resp 18   Ht 1.556 m (5' 1.25\")   Wt 55.2 kg (121 lb 9.6 oz)   SpO2 98%   BMI 22.79 kg/m   Estimated body mass index is 22.79 kg/m  as calculated from the following:    Height as of this encounter: 1.556 m (5' 1.25\").    Weight as of this encounter: 55.2 kg (121 lb 9.6 oz).  EXAM:   General Appearance: Pleasant, alert, appropriate appearance for age. No acute distress  Eye Exam:  Normal external eye, conjunctiva, lids, cornea. FAITH.  Ear Exam: Both ears with cerumen.  OroPharynx Exam:  Dental hygiene adequate. Normal buccal mucose. Normal pharynx.  Neck Exam:  Supple, no masses or nodes.  Thyroid Exam: No nodules or enlargement.  Chest/Respiratory Exam: Normal chest wall and respirations. Clear to auscultation.  Cardiovascular Exam: Regular rate and rhythm. S1, S2, no murmur, click, gallop, or rubs.  Gastrointestinal Exam: Soft, non-tender, no masses or organomegaly.  Musculoskeletal Exam: Stiff neck, limited ROM. Head is forward with lack of proper neck curvature.   Foot Exam: Left and right foot: good pedal " pulses.  Skin: over 1 cm area of erythema on right medial lower leg with central ulceration  Neurologic Exam: Nonfocal, symmetric DTRs, normal tone, coordination and no tremor. Strength may just slightly be reduced in right elbow flexion.  Psychiatric Exam: Alert and oriented - appropriate affect.      ASSESSMENT / PLAN:       ICD-10-CM    1. Encounter for Medicare annual wellness exam Z00.00    2. Right cervical radiculopathy M54.12 celecoxib (CELEBREX) 200 MG capsule     acetaminophen-codeine (TYLENOL #3) 300-30 MG tablet   3. Skin lesion of right leg L98.9 GENERAL SURG ADULT REFERRAL   4. Morton cardiac risk 10-20% in next 10 years Z91.89 CT Coronary Calcium Scan   5. Family history of ischemic heart disease Z82.49 CT Coronary Calcium Scan   6. Need for prophylactic vaccination and inoculation against influenza Z23 HC FLU VACCINE, INCREASED ANTIGEN, PRESV FREE [36143]     Radiculopathy has improved with PT.  However, she has stopped doing exercises this summer and now has reduced range of motion.  Recommend re-implementing exercise plan.  Congratulated on weaning down on opioids.  Refilled Tylenol 3 450 tablets.  Using a couple times a week her supply should last potentially 6 months before need for follow-up.  She is not interested in trying other medications such as Effexor or Cymbalta at this time for the neck.    Concerning skin lesion on the right lower leg for squamous cell cancer.  Nonhealing small ulcer present.  It has enlarged since previous biopsy in 2014. Recommend biopsy and potential excision with general surgery.  Referral placed and appointment arranged with Dr. Dill.         Family history of CAD, normal lipids. Ordered CT calcium score to grade risk and help determine need for a statin.    COUNSELING:  Reviewed preventive health counseling, as reflected in patient instructions       Regular exercise       Healthy diet/nutrition       Dental care       Immunizations    Vaccinated for:  "Influenza           Osteoporosis Prevention/Bone Health - DEXA with osteopenia in 2015, consider repeat in 2020       Colon cancer screening - repeat 2025       Hepatitis C screening - complete 2018             Estimated body mass index is 22.79 kg/m  as calculated from the following:    Height as of this encounter: 1.556 m (5' 1.25\").    Weight as of this encounter: 55.2 kg (121 lb 9.6 oz).    Weight management plan noted, stable and monitoring     reports that she has never smoked. She has never used smokeless tobacco.      Appropriate preventive services were discussed with this patient, including applicable screening as appropriate for cardiovascular disease, diabetes, osteopenia/osteoporosis, and glaucoma.  As appropriate for age/gender, discussed screening for colorectal cancer, prostate cancer, breast cancer, and cervical cancer. Checklist reviewing preventive services available has been given to the patient.    Reviewed patients plan of care and provided an AVS. The Basic Care Plan (routine screening as documented in Health Maintenance) for Corinne meets the Care Plan requirement. This Care Plan has been established and reviewed with the Patient.    Counseling Resources:  ATP IV Guidelines  Pooled Cohorts Equation Calculator  Breast Cancer Risk Calculator  FRAX Risk Assessment  ICSI Preventive Guidelines  Dietary Guidelines for Americans, 2010  USDA's MyPlate  ASA Prophylaxis  Lung CA Screening    Jose Duffy MD  [facility]  "

## 2019-11-08 NOTE — NURSING NOTE
Pt presents to clinic today for annual physical.      Medication Reconciliation: complete  Vianney Garcia LPN

## 2019-11-12 ENCOUNTER — HOSPITAL ENCOUNTER (OUTPATIENT)
Dept: CT IMAGING | Facility: OTHER | Age: 69
Discharge: HOME | End: 2019-11-12
Attending: FAMILY MEDICINE | Admitting: FAMILY MEDICINE
Payer: MEDICARE

## 2019-11-12 DIAGNOSIS — Z82.49 FAMILY HISTORY OF ISCHEMIC HEART DISEASE: ICD-10-CM

## 2019-11-12 DIAGNOSIS — Z91.89 FRAMINGHAM CARDIAC RISK 10-20% IN NEXT 10 YEARS: ICD-10-CM

## 2019-11-12 PROCEDURE — 75571 CT HRT W/O DYE W/CA TEST: CPT

## 2019-11-19 ENCOUNTER — OFFICE VISIT (OUTPATIENT)
Dept: SURGERY | Facility: OTHER | Age: 69
End: 2019-11-19
Attending: SURGERY
Payer: MEDICARE

## 2019-11-19 VITALS
HEART RATE: 76 BPM | TEMPERATURE: 97.4 F | DIASTOLIC BLOOD PRESSURE: 82 MMHG | HEIGHT: 61 IN | WEIGHT: 123 LBS | SYSTOLIC BLOOD PRESSURE: 138 MMHG | RESPIRATION RATE: 16 BRPM | BODY MASS INDEX: 23.22 KG/M2

## 2019-11-19 DIAGNOSIS — L98.9 SKIN LESION OF RIGHT LEG: Primary | ICD-10-CM

## 2019-11-19 DIAGNOSIS — D04.4 SQUAMOUS CELL CARCINOMA IN SITU (SCCIS) OF SKIN OF CROWN: ICD-10-CM

## 2019-11-19 PROCEDURE — G0463 HOSPITAL OUTPT CLINIC VISIT: HCPCS | Mod: 25

## 2019-11-19 PROCEDURE — 11104 PUNCH BX SKIN SINGLE LESION: CPT | Mod: XS | Performed by: SURGERY

## 2019-11-19 PROCEDURE — 17270 DSTR MAL LES S/N/H/F/G .5 /<: CPT | Performed by: SURGERY

## 2019-11-19 PROCEDURE — 88305 TISSUE EXAM BY PATHOLOGIST: CPT

## 2019-11-19 ASSESSMENT — PAIN SCALES - GENERAL: PAINLEVEL: NO PAIN (0)

## 2019-11-19 ASSESSMENT — MIFFLIN-ST. JEOR: SCORE: 1024.26

## 2019-11-19 NOTE — NURSING NOTE
Prior to the start of the procedure and with procedural staff participation, I verbally confirmed the patient s identity using two indicators, relevant allergies, that the procedure was appropriate and matched the consent or emergent situation, and that the correct equipment/implants were available. Immediately prior to starting the procedure I conducted the Time Out with the procedural staff and re-confirmed the patient s name, procedure, and site/side. (The Joint Commission universal protocol was followed.)  Yes    Sedation (Moderate or Deep): None  Alesha Lo LPN .......11/19/2019 9:16 AM

## 2019-11-19 NOTE — NURSING NOTE
"Chief Complaint   Patient presents with     Procedure     right leg lesion       Initial /82 (BP Location: Left arm, Patient Position: Sitting, Cuff Size: Adult Regular)   Pulse 76   Temp 97.4  F (36.3  C) (Tympanic)   Resp 16   Ht 1.556 m (5' 1.25\")   Wt 55.8 kg (123 lb)   BMI 23.05 kg/m   Estimated body mass index is 23.05 kg/m  as calculated from the following:    Height as of this encounter: 1.556 m (5' 1.25\").    Weight as of this encounter: 55.8 kg (123 lb).  Medication Reconciliation: complete    Alesha Lo LPN    "

## 2019-11-19 NOTE — PROGRESS NOTES
"SUBJECTIVE:  69 year old female presents for lesion evaluation. This lesion has been present for years but is getting bigger-it was biopsied many years ago and was a \"vasculitis\" but is different now. It is red and flaky. She also has 3 small rough, scaly spots on her scalp. She has a history of SSCIS of the scalp and these are similar. Not painful but do get irritated from her hair brush at times.    OBJECTIVE:  /82 (BP Location: Left arm, Patient Position: Sitting, Cuff Size: Adult Regular)   Pulse 76   Temp 97.4  F (36.3  C) (Tympanic)   Resp 16   Ht 1.556 m (5' 1.25\")   Wt 55.8 kg (123 lb)   BMI 23.05 kg/m    Scalp: 3 2 mm scaly pink patches top of head in the midline. No erythema or induration.  Left lower leg: 3.5 x 2.2 cm pink skin macular lesion with slight nodularity and ulceration on the medial border.    ASSESSMENT:size: see above-scalp SSCIS  Lower leg-skin lesion  Defect size: lower leg 0.3 cm  x 2 punch biopsy    PROCEDURE:  The pathophysiology of skin lesions and skin cancers was discussed with the patient. The risks, benefits and alternatives to biopsy of the lesion on the left lower leg were discussed with the patient, including the risks of infection, scarring, bruising, bleeding and the possible need for further procedures. The risk, benefits and alternatives to cryotherapy of the SSCIS on the scalp were discussed including the risk of recurrent and/or persistent lesions. The patient expressed understanding and wishes to proceed.  The area of the skin lesions on the scalp were treated with liquid nitrogen for 2 freeze thaw cycles. The patient tolerated the procedure with no immediately apparent complications.  Betadine was used to cleanse the skin in the area of the lesion on the left lower leg. 1% Lidocaine with epinephrine was infiltrated in the skin and subcutaneous tissue in the area of the lesion. When appropriate anesthesia had been achieved, the lesion was biopsied in 2 areas " with a 3 mm punch. The skin edges were approximated using 4-0 Ethilon. Sterile dressing was applied. The specimen was labelled and sent to pathology for evaluation. The procedure was well tolerated without complications. Patient was given post procedure instructions and denied further questions. Patient will follow up in 2 weeks to discuss pathology findings and plan.

## 2019-11-19 NOTE — PATIENT INSTRUCTIONS
Your incision was closed with stitches that will need to be removed.     It is ok to remove the dressing and get the incision wet in the shower on the day after your procedure.     Don't soak in a tub, pool or lake for 5 days.     If you have concerns, please call.     What to Expect Following Cryosurgery (Liquid Nitrogen)   What isCryosurgery?   Cryosurgery is a technique for removing skin lesions that primarily involve the surface of the skin, such as warts, seborrheic keratosis, or actinic keratosis. It is a quick method of removing the lesion with minimal scarring.   The liquid nitrogen needs karen applied long enough to freeze the affected skin. By freezing the skin, a blister is created underneath the lesion. Ideally, as the new skin forms underneath the blister, the abnormal skin on the roof of the blister peelsoff. Occasionally, if the lesion is very thick (such as a large wart), only the surface is blistered off. The base or residual lesion may need to be frozen at another visit.   It takes about one to two weeks for the scabto fall off, which is when the new layer of skin has formed under the blister. Areas of thinner skin, such as the face, may heal a little faster.      What to Expect Over the Next Few Weeks     During Treatment - Area being treated will sting, burn, and then possibly itch.     Immediately After Treatment - Area will be red, sore, and swollen.     Next Day - Blister or blood blister has formed, tendernessstarts to subside. Apply a Band-Aid if necessary.     7 Days - Surface is dark red/brown and scab-like. You can apply an antibacterial ointment, such as Polysporin , but you don't have to.     2 to 4 Weeks - The surface starts to peel off. This may be encouraged gently during bathing, when the scab is softened.     No makeup should be applied until area is fully healed.      How to Take Care of the Skin after Cryosurgery   A Band-Aid can be used for larger blisters or blisters in areas  that are more likely to betraumatized -such as fingers and toes. If the area becomes dry or crusted, an ointment (Vaseline , Bacitracin , or Polysporin ) can also be applied.   Cleanse area with a mild cleanser and cool water.   Patthe area dry with a lint-free cloth.   Avoid glycolic acids, Vitamin C, scrubs, Tretinoin (Retin-A), and Retinol creams for 7 to 10 days.  The area may get wet while bathing, but swimming or hot tub use should beavoided for one week following a treatment or while the skin is open.   Within 24 hours, you can expect the area to be swollen and/or blistered. The blister may not be visible to the naked eye.   Within one week, theswelling goes down. The top becomes dark red and scab-like. The scab will loosen over the next weeks, and should fall off within one month.      Adverse Effects   The most common adverse effects are pain,swelling/blistering, potential for infection, and discoloration of the skin after it heals.    Blisters  Anytime a blister surfaces, whether from ill-fitting shoes, an oven burn, or liquid nitrogencryosurgery, it will be a bit painful. For most patients, the pain is a temporary sting with some discomfort periodically over the next day as the blister forms.   The goal is to achieve a blister. This means, mostcommonly, patients will have a blister form following treatment. Sometimes, the blister is so thin that it can't be seen and may have minimal swelling. Occasionally, a blood blister forms that can be quite dramatic but isharmless.   Rarely, the blister may become infected. When this happens, the blister becomes unusually tender, the fluid becomes cloudy, and the redness around it becomes more extensive (and may even form streaks). If this happens, contact our office.   Some lesions, especially those on the face, may leave a slight palediscoloration.   True scarring, involving deeper layers of the skin is unlikely.

## 2019-12-10 ENCOUNTER — OFFICE VISIT (OUTPATIENT)
Dept: SURGERY | Facility: OTHER | Age: 69
End: 2019-12-10
Attending: FAMILY MEDICINE
Payer: MEDICARE

## 2019-12-10 VITALS
WEIGHT: 123 LBS | RESPIRATION RATE: 16 BRPM | SYSTOLIC BLOOD PRESSURE: 114 MMHG | TEMPERATURE: 97.7 F | DIASTOLIC BLOOD PRESSURE: 60 MMHG | BODY MASS INDEX: 23.05 KG/M2 | HEART RATE: 72 BPM

## 2019-12-10 DIAGNOSIS — L30.9 DERMATITIS: Primary | ICD-10-CM

## 2019-12-10 DIAGNOSIS — L57.0 ACTINIC KERATOSES: ICD-10-CM

## 2019-12-10 PROCEDURE — 99212 OFFICE O/P EST SF 10 MIN: CPT | Performed by: SURGERY

## 2019-12-10 PROCEDURE — G0463 HOSPITAL OUTPT CLINIC VISIT: HCPCS

## 2019-12-10 RX ORDER — TRIAMCINOLONE ACETONIDE 5 MG/G
1 OINTMENT TOPICAL 2 TIMES DAILY
Qty: 15 G | Refills: 1 | Status: SHIPPED | OUTPATIENT
Start: 2019-12-10 | End: 2020-11-09

## 2019-12-10 ASSESSMENT — PAIN SCALES - GENERAL: PAINLEVEL: NO PAIN (0)

## 2019-12-10 NOTE — NURSING NOTE
"Chief Complaint   Patient presents with     RECHECK     follow up right leg skin lesion       Initial /60 (BP Location: Right arm, Patient Position: Sitting, Cuff Size: Adult Regular)   Pulse 72   Temp 97.7  F (36.5  C) (Tympanic)   Resp 16   Wt 55.8 kg (123 lb)   BMI 23.05 kg/m   Estimated body mass index is 23.05 kg/m  as calculated from the following:    Height as of 11/19/19: 1.556 m (5' 1.25\").    Weight as of this encounter: 55.8 kg (123 lb).  Medication Reconciliation: complete    Alesha Lo LPN    "

## 2019-12-10 NOTE — PATIENT INSTRUCTIONS
Use the cream on the leg lesion. Monitor the bumps on your head. Follow up-will consider removing the lumps if they are still there.

## 2019-12-10 NOTE — PROGRESS NOTES
Patient presents for post procedure visit after biopsy of a right lower leg lesion on 11/19. Patient has done well. No problems with biopsy sites. She also had cryotherapy of some scalp AK-those are smaller but still lumpy.    /60 (BP Location: Right arm, Patient Position: Sitting, Cuff Size: Adult Regular)   Pulse 72   Temp 97.7  F (36.5  C) (Tympanic)   Resp 16   Wt 55.8 kg (123 lb)   BMI 23.05 kg/m    General: NAD, pleasant and cooperative with exam and interview.  Skin: healing biopsy site right lower leg. No sign of infection. No pain with palpation.  Scalp with keratotic lesions-still with some eschar  Psychiatry: awake, alert and oriented. Appropriate affect.  Pathology results: dermatitis of right lower leg  Assessment/Plan: Discussed procedure and pathology results. Patient can return to normal activities. Will start topical steroid for dermatitis. She will continue to monitor the scalp lesions. Follow up in 1 month.  Patient will call with questions or concerns before follow up.

## 2020-01-09 ENCOUNTER — OFFICE VISIT (OUTPATIENT)
Dept: SURGERY | Facility: OTHER | Age: 70
End: 2020-01-09
Attending: SURGERY
Payer: MEDICARE

## 2020-01-09 VITALS
DIASTOLIC BLOOD PRESSURE: 70 MMHG | HEART RATE: 76 BPM | WEIGHT: 125 LBS | TEMPERATURE: 97 F | RESPIRATION RATE: 16 BRPM | SYSTOLIC BLOOD PRESSURE: 130 MMHG | HEIGHT: 61 IN | BODY MASS INDEX: 23.6 KG/M2

## 2020-01-09 DIAGNOSIS — Z87.2: Primary | ICD-10-CM

## 2020-01-09 DIAGNOSIS — L57.0 ACTINIC KERATOSES: ICD-10-CM

## 2020-01-09 PROCEDURE — 99212 OFFICE O/P EST SF 10 MIN: CPT | Performed by: SURGERY

## 2020-01-09 PROCEDURE — G0463 HOSPITAL OUTPT CLINIC VISIT: HCPCS

## 2020-01-09 ASSESSMENT — PAIN SCALES - GENERAL: PAINLEVEL: NO PAIN (0)

## 2020-01-09 ASSESSMENT — MIFFLIN-ST. JEOR: SCORE: 1029.76

## 2020-01-09 NOTE — NURSING NOTE
Chief Complaint   Patient presents with     RECHECK     REcheck Right Leg wound       Medication Reconciliation: completed   Wendi Felix LPN  1/9/2020 8:31 AM

## 2020-01-09 NOTE — PROGRESS NOTES
Primary Care Physician: Jose Duffy MD      HPI:   Patient is here for follow up. The patient is 69 year old female with chronic dermatitis on her right lower leg. It was biopsied. She has been using the steroid cream and it isn't making much difference. The areas of the biopsies area scabbed but not healed completely. The scalp actinic keratoses have resolved.      ASSESSMENT AND PLAN/RECOMMENDATIONS:   Chronic dermatitis right lower leg. Discussed the option of excision but concern about healing after that and since she isn't having significant symptoms, she would like to continue to monitor it.   AK scalp-resolved after cryotherapy. Continue to monitor-call if there is recurrence or new lesions.   Patient denies further questions.     Past Medical History:   Diagnosis Date     Actinic keratosis     2/22/2013     Age-related osteoporosis without current pathological fracture     on Fosamax for 17 years--stopped 2011     Asymptomatic menopausal state     age 50     Closed displaced fracture of lateral malleolus of fibula     5/17/2010     Disorder of cartilage     No Comments Provided     Displaced fracture of lateral malleolus of unspecified fibula, initial encounter for open fracture type I or II     No Comments Provided     Dizziness and giddiness     No Comments Provided     Fracture of upper end of left humerus with routine healing     1/4/2018     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) 8/15/2016     Other cervical disc degeneration, unspecified cervical region     No Comments Provided     Other specified types of non-hodgkin lymphoma, extranodal and solid organ sites (H)     2011     Personal history of other malignant neoplasm of skin (CODE)     3/28/2017     Rheumatic fever without heart involvement     10+ years ago     Tension-type headache, not intractable     No Comments Provided      Past Surgical History:   Procedure Laterality Date     BIOPSY BREAST      mid 1990s,benign      COLONOSCOPY      age 55,normal 2005     COLONOSCOPY  12/10/2015    12/10/2015,lnormal--repeat 10 years, 12/10/2025     FUSION CERVICAL ANTERIOR ONE LEVEL  2012    C5-6 on left with discectomy     LAMINECTOMY LUMBAR ONE LEVEL      ,L4-5     LAPAROSCOPIC TUBAL LIGATION      FOR ECTOPIC PREGNANCY     OTHER SURGICAL HISTORY      ,[id number],SKIN BIOPSY,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     OTHER SURGICAL HISTORY      ,73945.0,[place] ECTOP PREG BY LAPAROSCOPE,Left,left tube and ovary removed     TONSILLECTOMY           Family History   Problem Relation Age of Onset     Heart Disease Child      Heart Disease Mother         Heart Disease, after cardiac bypass, age 71     Heart Disease Father         Heart Disease,congestive heart failure     Breast Cancer Sister      Hypertension Brother      Diabetes Brother      Breast Cancer Niece      Hypertension Sister      Hypertension Sister      Hypertension Sister      Emphysema Sister      No Known Problems Brother      Heart Disease Brother      Colon Cancer Paternal Half-Brother      Social History     Socioeconomic History     Marital status:      Spouse name: None     Number of children: None     Years of education: None     Highest education level: None   Occupational History     None   Social Needs     Financial resource strain: None     Food insecurity:     Worry: None     Inability: None     Transportation needs:     Medical: None     Non-medical: None   Tobacco Use     Smoking status: Never Smoker     Smokeless tobacco: Never Used   Substance and Sexual Activity     Alcohol use: Yes     Alcohol/week: 1.0 standard drinks     Comment: Alcoholic Drinks/day: once weekly she will have a glass of beer or wine with dinner.     Drug use: No     Sexual activity: Yes     Partners: Male   Lifestyle     Physical activity:     Days per week: None     Minutes per session: None     Stress: None   Relationships     Social connections:      "Talks on phone: None     Gets together: None     Attends Islam service: None     Active member of club or organization: None     Attends meetings of clubs or organizations: None     Relationship status: None     Intimate partner violence:     Fear of current or ex partner: None     Emotionally abused: None     Physically abused: None     Forced sexual activity: None   Other Topics Concern     Parent/sibling w/ CABG, MI or angioplasty before 65F 55M? 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      No Known Allergies  REVIEW OF SYSTEMS  GENERAL: No fevers or chills. Denies fatigue, recent weight loss.  HEENT: No sinus drainage. No changes with vision or hearing. No difficulty swallowing.   LYMPHATICS:  No swollen nodes in axilla, neck or groin.  CARDIOVASCULAR: Denies chest pain, palpitations and dyspnea on exertion.  PULMONARY: No shortness of breath or cough. No increase in sputum production.  GI: Denies melena, bright red blood in stools. No hematemesis. No constipation or diarrhea.  : No dysuria or hematuria.  SKIN: No new lesions, rashes or ulcers.   HEMATOLOGY:  No history of easy bruising or bleeding.  ENDOCRINE:  No history of diabetes or thyroid problems.  NEUROLOGY:  No history of seizures or headaches. No motor or sensory changes.    PHYSICAL EXAM  Vitals: /70 (BP Location: Right arm, Patient Position: Sitting, Cuff Size: Adult Regular)   Pulse 76   Temp 97  F (36.1  C) (Tympanic)   Resp 16   Ht 1.55 m (5' 1.02\")   Wt 56.7 kg (125 lb)   Breastfeeding " No   BMI 23.60 kg/m    GENERAL: Healthy appearing patient in no acute distress. Pleasant and cooperative with exam and interview.   HEENT:Head-normocephalic-no scalp lesions noted. Eyes-no scleral icterus. Nose-no nasal drainage. No lesions. Mouth-oral mucosa pink and moist, no lesions.  SKIN: Pink, warm and dry. No jaundice. No rash. Stable patch of dermatitis right lower leg with eschar at 2 punch biopsy sites.  NEURO:  Cranial nerves II-XII grossly intact. Alert and oriented.  PSYCH: Appropriate mood and affect.

## 2020-02-17 ENCOUNTER — OFFICE VISIT (OUTPATIENT)
Dept: FAMILY MEDICINE | Facility: OTHER | Age: 70
End: 2020-02-17
Attending: FAMILY MEDICINE
Payer: MEDICARE

## 2020-02-17 VITALS
WEIGHT: 121 LBS | TEMPERATURE: 97.7 F | HEART RATE: 86 BPM | OXYGEN SATURATION: 98 % | BODY MASS INDEX: 22.84 KG/M2 | DIASTOLIC BLOOD PRESSURE: 82 MMHG | SYSTOLIC BLOOD PRESSURE: 132 MMHG | RESPIRATION RATE: 14 BRPM

## 2020-02-17 DIAGNOSIS — H61.23 BILATERAL IMPACTED CERUMEN: Primary | ICD-10-CM

## 2020-02-17 PROCEDURE — G0463 HOSPITAL OUTPT CLINIC VISIT: HCPCS

## 2020-02-17 PROCEDURE — 99212 OFFICE O/P EST SF 10 MIN: CPT | Mod: 25 | Performed by: FAMILY MEDICINE

## 2020-02-17 PROCEDURE — 69209 REMOVE IMPACTED EAR WAX UNI: CPT | Mod: XU

## 2020-02-17 PROCEDURE — G0463 HOSPITAL OUTPT CLINIC VISIT: HCPCS | Mod: 25 | Performed by: FAMILY MEDICINE

## 2020-02-17 PROCEDURE — 69210 REMOVE IMPACTED EAR WAX UNI: CPT | Performed by: FAMILY MEDICINE

## 2020-02-17 ASSESSMENT — PAIN SCALES - GENERAL: PAINLEVEL: NO PAIN (0)

## 2020-02-17 NOTE — NURSING NOTE
Pt presents to clinic today for muffled hearing x 3- 4 weeks      Medication Reconciliation: complete  Vianney Garcia LPN

## 2020-02-17 NOTE — PROGRESS NOTES
Nursing Notes:   Vianney Garcia LPN  2/17/2020  3:07 PM  Signed  Pt presents to clinic today for muffled hearing x 3- 4 weeks      Medication Reconciliation: complete  iVanney Garcia LPN     SUBJECTIVE:  70 year old female presents for worse hearing for a few weeks. Worse one day when she woke up. Left side is worse than right.  Notes some cracking in ears.       REVIEW OF SYSTEMS:    Pertinent items are noted in HPI.    Current Outpatient Medications   Medication Sig Dispense Refill     acetaminophen-codeine (TYLENOL #3) 300-30 MG tablet Take 1-2 tablets by mouth daily as needed for severe pain 150 tablet 0     calcium carbonate (CALCIUM CARBONATE) 600 MG tablet Take by mouth 2 times daily       celecoxib (CELEBREX) 200 MG capsule Take 1 capsule (200 mg) by mouth daily with food 90 capsule 3     cholecalciferol (D 2000) 2000 UNITS tablet Take 2,000 Units by mouth daily       triamcinolone (KENALOG) 0.5 % external ointment Apply 1 g topically 2 times daily (Patient not taking: Reported on 2/17/2020) 15 g 1     No Known Allergies    OBJECTIVE:  /82   Pulse 86   Temp 97.7  F (36.5  C) (Temporal)   Resp 14   Wt 54.9 kg (121 lb)   SpO2 98%   BMI 22.84 kg/m      EXAM:  General Appearance: Alert. No acute distress  Ears: Bilateral cerumen impaction  Psychiatric: Normal affect and mentation      ASSESSMENT/PLAN:    ICD-10-CM    1. Bilateral impacted cerumen H61.23 REMOVE IMPACTED CERUMEN       Cerumen impaction explains her decreased hearing.  I attempted to remove cerumen bilaterally with a curette.  Significant amounts removed, but some still persisted deep in the canal.  Nurse performed lavage to remove remainder.  Follow-up as needed     Jose Duffy MD    This document was prepared using a combination of typing and voice generated software.  While every attempt was made for accuracy, spelling and grammatical errors may exist.

## 2020-04-27 ENCOUNTER — MYC REFILL (OUTPATIENT)
Dept: FAMILY MEDICINE | Facility: OTHER | Age: 70
End: 2020-04-27

## 2020-04-27 DIAGNOSIS — M54.12 RIGHT CERVICAL RADICULOPATHY: Primary | ICD-10-CM

## 2020-04-28 NOTE — TELEPHONE ENCOUNTER
Patient sent [employer] message, requesting refill of the following, to be sent to [id number] of [place]:     acetaminophen-codeine (TYLENOL #3) 300-30 MG tablet      Sig: Take 1-2 tablets by mouth daily as needed for severe pain    Last Prescription Date:   11/8/19  Last Fill Qty/Refills:         150, R-0    Last Office Visit:              2/17/20  Future Office visit:           None.  Routing refill request to provider for review/approval because:  Drug not on the [facility] or [facility] refill protocol or controlled substance    Unable to complete prescription refill per RN Medication Refill Policy. Cece Johansen RN .............. 4/28/2020  10:54 AM

## 2020-05-18 ENCOUNTER — MYC REFILL (OUTPATIENT)
Dept: FAMILY MEDICINE | Facility: OTHER | Age: 70
End: 2020-05-18

## 2020-05-18 DIAGNOSIS — M54.12 RIGHT CERVICAL RADICULOPATHY: ICD-10-CM

## 2020-05-19 ENCOUNTER — MYC MEDICAL ADVICE (OUTPATIENT)
Dept: FAMILY MEDICINE | Facility: OTHER | Age: 70
End: 2020-05-19

## 2020-05-19 NOTE — TELEPHONE ENCOUNTER
Patient sent [employer] message, requesting refill of the following, to be sent to [id number] of [place]: Patient comment: Using for chronic neck pain with episodes of severe pain in the 8 out of 10 pain severity score.  Also having episodes of nerve pain down my left arm.     acetaminophen-codeine (TYLENOL #3) 300-30 MG tablet  150 tablet  0  4/29/2020   No    Sig - Route: Take 1-2 tablets by mouth daily as needed for severe pain - Oral    Class: E-Prescribe    E-Prescribing Status: Receipt confirmed by pharmacy (4/29/2020  4:11 PM CDT)      [id number] - [place], [place] - [address]      If taking as directed, and at maximum dose, Pt should not be due for refill, until 7/12/20.    The following message, was sent to Pt, via [employer]:    Dear Corinne G Hultman,    We have received your request for medication(s) and/or supplies via [employer].  As this message is not visible to your pharmacy, we ask that you please contact them directly for any refills that you are in need of.  Our records indicate that a prescription was sent to [facility] [place] on 4/29/20 for #150 tablets. If taking as directed, and at the maximum dose, you should not be due for refill until 7/12/20.  If there are any issues with your refill(s), the pharmacy will then contact [facility] to ensure your request is handled in the most timely manner.       If your pain is severe and you are having episodes of nerve pain, it is recommended that you call us at [phone], to schedule an appointment or be triaged by an RN, to determine if this is a more urgent situation, warranting evaluation in the emergency department. You can also walk into to the clinic, Monday through Sunday, from 8 AM until 5 PM.     Sincerely,  [facility]   Refill/Triage RN     Refill request disregarded at this time. Unable to complete prescription refill per RN Medication Refill  Policy. Cece Johansen RN .............. 5/19/2020  10:51 AM

## 2020-08-03 NOTE — MR AVS SNAPSHOT
After Visit Summary   4/27/2018    Corinne G Hultman    MRN: [id number]           Patient Information     Date Of Birth          1950        Visit Information        Provider Department      4/27/2018 8:50 AM Ashia Dill MD;   SURGERY [facility]        Care Instructions    Your incision was closed with stitches that will disolve. It is ok to remove the dressing and get the incision wet in the shower on the day after your procedure.  Don't soak in a tub, pool or lakefor 5 days. If you have concerns, please call.   What to Expect Following Cryosurgery (Liquid Nitrogen)   What isCryosurgery?   Cryosurgery is a technique for removing skin lesions that primarily involve the surface of the skin, such as warts, seborrheic keratosis, or actinic keratosis. It is a quick method of removing the lesion with minimal scarring.   The liquid nitrogen needs karen applied long enough to freeze the affected skin. By freezing the skin, a blister is created underneath the lesion. Ideally, as the new skin forms underneath the blister, the abnormal skin on the roof of the blister peelsoff. Occasionally, if the lesion is very thick (such as a large wart), only the surface is blistered off. The base or residual lesion may need to be frozen at another visit.   It takes about one to two weeks for the scabto fall off, which is when the new layer of skin has formed under the blister. Areas of thinner skin, such as the face, may heal a little faster.      What to Expect Over the Next Few Weeks     During Treatment - Area being treated will sting, burn, and then possibly itch.     Immediately After Treatment - Area will be red, sore, and swollen.     Next Day - Blister or blood blister has formed, tendernessstarts to subside. Apply a Band-Aid if necessary.     7 Days - Surface is dark red/brown and scab-like. You can apply an antibacterial ointment, such as Polysporin , but you don't have to.     2  N[facility] to 4 Weeks - The surface starts to peel off. This may be encouraged gently during bathing, when the scab is softened.     No makeup should be applied until area is fully healed.      How to Take Care of the Skin after Cryosurgery   A Band-Aid can be used for larger blisters or blisters in areas that are more likely to betraumatized -such as fingers and toes. If the area becomes dry or crusted, an ointment (Vaseline , Bacitracin , or Polysporin ) can also be applied.   Cleanse area with a mild cleanser and cool water.   Patthe area dry with a lint-free cloth.   Avoid glycolic acids, Vitamin C, scrubs, Tretinoin (Retin-A), and Retinol creams for 7 to 10 days.  The area may get wet while bathing, but swimming or hot tub use should beavoided for one week following a treatment or while the skin is open.   Within 24 hours, you can expect the area to be swollen and/or blistered. The blister may not be visible to the naked eye.   Within one week, theswelling goes down. The top becomes dark red and scab-like. The scab will loosen over the next weeks, and should fall off within one month.      Adverse Effects   The most common adverse effects are pain,swelling/blistering, potential for infection, and discoloration of the skin after it heals.     Blisters  Anytime a blister surfaces, whether from ill-fitting shoes, an oven burn, or liquid nitrogencryosurgery, it will be a bit painful. For most patients, the pain is a temporary sting with some discomfort periodically over the next day as the blister forms.   The goal is to achieve a blister. This means, mostcommonly, patients will have a blister form following treatment. Sometimes, the blister is so thin that it can't be seen and may have minimal swelling. Occasionally, a blood blister forms that can be quite dramatic but isharmless.   Rarely, the blister may become infected. When this happens, the blister becomes unusually tender, the fluid becomes cloudy, and the redness  around it becomes more extensive (and may even form streaks). If this happens, contact our office.   Some lesions, especially those on the face, may leave a slight palediscoloration.   True scarring, involving deeper layers of the skin is unlikely.     At your visit today, we discussed your risk for falls and preventive options.    Fall Prevention  Falls often occur due to slipping, tripping or losing your balance. Millions of people fall every year and injure themselves. Here are ways to reduce your risk of falling again.    Think about your fall, was there anything that caused your fall that can be fixed, removed, or replaced?    Make your home safe by keeping walkways clear of objects you may trip over.    Use non-slip pads under rugs. Do not use area rugs or small throw rugs.    Use non-slip mats in bathtubs and showers.    Install handrails and lights on staircases.    Do not walk in poorly lit areas.    Do not stand on chairs or wobbly ladders.    Use caution when reaching overhead or looking upward. This position can cause a loss of balance.    Be sure your shoes fit properly, have non-slip bottoms and are in good condition.     Wear shoes both inside and out. Avoid going barefoot or wearing slippers.    Be cautious when going up and down stairs, curbs, and when walking on uneven sidewalks.    If your balance is poor, consider using a cane or walker.    If your fall was related to alcohol use, stop or limit alcohol intake.     If your fall was related to use of sleeping medicines, talk to your doctor about this. You may need to reduce your dosage at bedtime if you awaken during the night to go to the bathroom.      To reduce the need for nighttime bathroom trips:  ? Avoid drinking fluids for several hours before going to bed  ? Empty your bladder before going to bed  ? Men can keep a urinal at the bedside    Stay as active as you can. Balance, flexibility, strength, and endurance all come from exercise. They  "all play a role in preventing falls. Ask your healthcare provider which types of activity are right for you.    Get your vision checked on a regular basis.    If you have pets, know where they are before you stand up or walk so you don't trip over them.    Use night lights.  Date Last Reviewed: 11/5/2015 2000-2017 [employer]. [address] [place] [address]. All rights reserved. This information is not intended as a substitute for professional medical care. Always follow your healthcare professional's instructions.                Follow-ups after your visit        Follow-up notes from your care team     Return if symptoms worsen or fail to improve.      Who to contact     If you have questions or need follow up information about today's clinic visit or your schedule please contact [facility] directly at [phone].  Normal or non-critical lab and imaging results will be communicated to you by [employer], letter or phone within 4 business days after the clinic has received the results. If you do not hear from us within 7 days, please contact the clinic through [employer] or phone. If you have a critical or abnormal lab result, we will notify you by phone as soon as possible.  Submit refill requests through [facility] or call your pharmacy and they will forward the refill request to us. Please allow 3 business days for your refill to be completed.          Additional Information About Your Visit        [facility] Information     [facility] lets you send messages to your doctor, view your test results, renew your prescriptions, schedule appointments and more. To sign up, go to [email or web address] . Click on \"Log in\" on the left side of the screen, which will take you to the Welcome page. Then click on \"Sign up Now\" on the right side of the page.     You will be asked to enter the access code listed below, as well as some personal information. Please follow the directions to " "create your username and password.     Your access code is: [email or web address]  Expires: 2018 11:57 AM     Your access code will  in 90 days. If you need help or a new code, please call your [facility] or [phone].        Care EveryWhere ID     This is your Care EveryWhere ID. This could be used by other organizations to access your [facility] medical records  [id number]        Your Vitals Were     Height BMI (Body Mass Index)                5' 1.75\" (1.568 m) 21.43 kg/m2           Blood Pressure from Last 3 Encounters:   18 114/62   18 110/72   18 112/72    Weight from Last 3 Encounters:   18 116 lb 3.2 oz (52.7 kg)   18 114 lb (51.7 kg)   18 114 lb (51.7 kg)              Today, you had the following     No orders found for display       Primary Care Provider Office Phone # Fax #    Amaury Guevara [phone]       [address] [facility] [address]        Equal Access to Services     [facility]: Hadii jennifer ku hadasho Soana luisaali, waaxda luqadaha, qaybta kaalmada adebozenayada, inge iverson. So [facility] [phone].    ATENCIÓN: Si habla español, tiene a acosta disposición servicios gratuitos de asistencia lingüística. Llame al [phone].    We comply with applicable federal civil rights laws and [place] laws. We do not discriminate on the basis of race, color, national origin, age, disability, sex, sexual orientation, or gender identity.            Thank you!     Thank you for choosing [facility]  for your care. Our goal is always to provide you with excellent care. Hearing back from our patients is one way we can continue to improve our services. Please take a few minutes to complete the written survey that you may receive in the mail after your visit with us. Thank you!             Your Updated Medication List - Protect others around you: Learn how to safely use, store and throw away your " medicines at www.disposemymeds.org.          This list is accurate as of 4/27/18  9:15 AM.  Always use your most recent med list.                   Brand Name Dispense Instructions for use Diagnosis    acetaminophen-codeine 300-30 MG per tablet    TYLENOL #3    100 tablet    TAKE 1-2 TABLETS BY MOUTH EVERY 6 HOURS AS NEEDED FOR PAIN    Tension-type headache, not intractable, unspecified chronicity pattern       butalbital-acetaminophen-caffeine -40 MG per tablet    FIORICET/ESGIC    90 tablet    TAKE 1 TO 2 TABLETS BY MOUTH EVERY 4 TO 6 HOURS AS NEEDED FOR HEADACHE. MAX OF 6 DOSES PER DAY.    Tension headache       calcium carbonate 600 MG tablet   Generic drug:  calcium carbonate      Take by mouth 2 times daily        celecoxib 200 MG capsule    celeBREX     Take 200 mg by mouth daily with food        Cranberry 500 MG Caps      Take by mouth daily        D 2000 2000 units tablet   Generic drug:  cholecalciferol      Take 2,000 Units by mouth daily        ibuprofen 200 MG tablet    ADVIL/MOTRIN     Take 600-800 mg by mouth as needed        polyethylene glycol Packet    MIRALAX/GLYCOLAX     Take by mouth daily as needed Take 1 cap by mouth daily if needed.        zolpidem 5 MG tablet    AMBIEN    30 tablet    TAKE 1/2-1 TABLET BY MOUTH EVERY DAY AT BEDTIME    Primary insomnia

## 2020-11-09 ENCOUNTER — OFFICE VISIT (OUTPATIENT)
Dept: FAMILY MEDICINE | Facility: OTHER | Age: 70
End: 2020-11-09
Attending: FAMILY MEDICINE
Payer: MEDICARE

## 2020-11-09 VITALS
DIASTOLIC BLOOD PRESSURE: 78 MMHG | RESPIRATION RATE: 20 BRPM | TEMPERATURE: 97.8 F | HEART RATE: 92 BPM | WEIGHT: 118.25 LBS | SYSTOLIC BLOOD PRESSURE: 132 MMHG | BODY MASS INDEX: 22.33 KG/M2 | HEIGHT: 61 IN

## 2020-11-09 DIAGNOSIS — M54.12 RIGHT CERVICAL RADICULOPATHY: ICD-10-CM

## 2020-11-09 DIAGNOSIS — Z13.220 LIPID SCREENING: ICD-10-CM

## 2020-11-09 DIAGNOSIS — Z23 NEEDS FLU SHOT: ICD-10-CM
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
## 2020-11-09 DIAGNOSIS — Z79.1 ENCOUNTER FOR MONITORING CHRONIC NSAID THERAPY: ICD-10-CM

## 2020-11-09 DIAGNOSIS — M85.89 OSTEOPENIA OF MULTIPLE SITES: ICD-10-CM

## 2020-11-09 DIAGNOSIS — S50.862A INSECT BITE OF LEFT FOREARM, INITIAL ENCOUNTER: ICD-10-CM

## 2020-11-09 DIAGNOSIS — H61.23 BILATERAL IMPACTED CERUMEN: ICD-10-CM

## 2020-11-09 DIAGNOSIS — Z12.31 ENCOUNTER FOR SCREENING MAMMOGRAM FOR MALIGNANT NEOPLASM OF BREAST: ICD-10-CM

## 2020-11-09 DIAGNOSIS — Z51.81 ENCOUNTER FOR MONITORING CHRONIC NSAID THERAPY: ICD-10-CM

## 2020-11-09 DIAGNOSIS — Z00.00 ENCOUNTER FOR MEDICARE ANNUAL WELLNESS EXAM: Primary | ICD-10-CM

## 2020-11-09 LAB
ALBUMIN SERPL-MCNC: 4.8 G/DL (ref 3.5–5.7)
ALP SERPL-CCNC: 44 U/L (ref 34–104)
ALT SERPL W P-5'-P-CCNC: 16 U/L (ref 7–52)
ANION GAP SERPL CALCULATED.3IONS-SCNC: 8 MMOL/L (ref 3–14)
AST SERPL W P-5'-P-CCNC: 22 U/L (ref 13–39)
BILIRUB SERPL-MCNC: 0.7 MG/DL (ref 0.3–1)
BUN SERPL-MCNC: 14 MG/DL (ref 7–25)
CALCIUM SERPL-MCNC: 10 MG/DL (ref 8.6–10.3)
CHLORIDE SERPL-SCNC: 99 MMOL/L (ref 98–107)
CHOLEST SERPL-MCNC: 204 MG/DL
CO2 SERPL-SCNC: 28 MMOL/L (ref 21–31)
CREAT SERPL-MCNC: 0.73 MG/DL (ref 0.6–1.2)
DEPRECATED CALCIDIOL+CALCIFEROL SERPL-MC: 49.1 NG/ML
GFR SERPL CREATININE-BSD FRML MDRD: 79 ML/MIN/{1.73_M2}
GLUCOSE SERPL-MCNC: 104 MG/DL (ref 70–105)
HDLC SERPL-MCNC: 82 MG/DL (ref 23–92)
LDLC SERPL CALC-MCNC: 105 MG/DL
MAGNESIUM SERPL-MCNC: 2.1 MG/DL (ref 1.9–2.7)
NONHDLC SERPL-MCNC: 122 MG/DL
PHOSPHATE SERPL-MCNC: 4 MG/DL (ref 2.5–5)
POTASSIUM SERPL-SCNC: 4.3 MMOL/L (ref 3.5–5.1)
PROT SERPL-MCNC: 7.6 G/DL (ref 6.4–8.9)
SODIUM SERPL-SCNC: 135 MMOL/L (ref 134–144)
TRIGL SERPL-MCNC: 87 MG/DL

## 2020-11-09 PROCEDURE — G0463 HOSPITAL OUTPT CLINIC VISIT: HCPCS

## 2020-11-09 PROCEDURE — G0463 HOSPITAL OUTPT CLINIC VISIT: HCPCS | Mod: 25

## 2020-11-09 PROCEDURE — 84100 ASSAY OF PHOSPHORUS: CPT | Mod: ZL | Performed by: FAMILY MEDICINE

## 2020-11-09 PROCEDURE — 36415 COLL VENOUS BLD VENIPUNCTURE: CPT | Mod: ZL | Performed by: FAMILY MEDICINE

## 2020-11-09 PROCEDURE — 99212 OFFICE O/P EST SF 10 MIN: CPT | Mod: 25 | Performed by: FAMILY MEDICINE

## 2020-11-09 PROCEDURE — G0439 PPPS, SUBSEQ VISIT: HCPCS | Performed by: FAMILY MEDICINE

## 2020-11-09 PROCEDURE — G0008 ADMIN INFLUENZA VIRUS VAC: HCPCS

## 2020-11-09 PROCEDURE — 90715 TDAP VACCINE 7 YRS/> IM: CPT

## 2020-11-09 PROCEDURE — 69209 REMOVE IMPACTED EAR WAX UNI: CPT | Performed by: FAMILY MEDICINE

## 2020-11-09 PROCEDURE — 82306 VITAMIN D 25 HYDROXY: CPT | Mod: ZL | Performed by: FAMILY MEDICINE

## 2020-11-09 PROCEDURE — 83735 ASSAY OF MAGNESIUM: CPT | Mod: ZL | Performed by: FAMILY MEDICINE

## 2020-11-09 PROCEDURE — 80053 COMPREHEN METABOLIC PANEL: CPT | Mod: ZL | Performed by: FAMILY MEDICINE

## 2020-11-09 PROCEDURE — 80061 LIPID PANEL: CPT | Mod: ZL | Performed by: FAMILY MEDICINE

## 2020-11-09 RX ORDER — CELECOXIB 200 MG/1
200 CAPSULE ORAL
Qty: 90 CAPSULE | Refills: 3 | Status: SHIPPED | OUTPATIENT
Start: 2020-11-09 | End: 2021-11-03

## 2020-11-09 ASSESSMENT — ANXIETY QUESTIONNAIRES
5. BEING SO RESTLESS THAT IT IS HARD TO SIT STILL: NOT AT ALL
7. FEELING AFRAID AS IF SOMETHING AWFUL MIGHT HAPPEN: NOT AT ALL
IF YOU CHECKED OFF ANY PROBLEMS ON THIS QUESTIONNAIRE, HOW DIFFICULT HAVE THESE PROBLEMS MADE IT FOR YOU TO DO YOUR WORK, TAKE CARE OF THINGS AT HOME, OR GET ALONG WITH OTHER PEOPLE: NOT DIFFICULT AT ALL
2. NOT BEING ABLE TO STOP OR CONTROL WORRYING: NOT AT ALL
GAD7 TOTAL SCORE: 0
3. WORRYING TOO MUCH ABOUT DIFFERENT THINGS: NOT AT ALL
1. FEELING NERVOUS, ANXIOUS, OR ON EDGE: NOT AT ALL
6. BECOMING EASILY ANNOYED OR IRRITABLE: NOT AT ALL

## 2020-11-09 ASSESSMENT — PATIENT HEALTH QUESTIONNAIRE - PHQ9
5. POOR APPETITE OR OVEREATING: NOT AT ALL
SUM OF ALL RESPONSES TO PHQ QUESTIONS 1-9: 0

## 2020-11-09 ASSESSMENT — PAIN SCALES - GENERAL: PAINLEVEL: NO PAIN (0)

## 2020-11-09 ASSESSMENT — MIFFLIN-ST. JEOR: SCORE: 997.72

## 2020-11-09 NOTE — NURSING NOTE
The ear canal was irrigated withbody-temperature tap water with the jet of water directed superiorly.  The ear canal was then re-examined and cleared of the impaction.  The patient tolerated the procedure well.      Immunization Documentation    Prior to Immunization administration, verified patients identity using patient's name and date of birth. Please see IMMUNIZATIONS  and order for additional information.  Patient / Parent instructed to remain in clinic for 15 minutes and report any adverse reaction to staff immediately.    Was entire vial of medication used? Yes  Vial/Syringe: Riley Ortiz LPN  11/9/2020   12:00 PM

## 2020-11-09 NOTE — PROGRESS NOTES
"SUBJECTIVE:   Corinne G Hultman is a 70 year old female who presents for Preventive Visit.      Patient has been advised of split billing requirements and indicates understanding: Yes  Are you in the first 12 months of your Medicare Part B coverage?  No    Physical Health:    In general, how would you rate your overall physical health? excellent    Outside of work, how many days during the week do you exercise? 6-7 days/week    Outside of work, approximately how many minutes a day do you exercise?greater than 60 minutes    If you drink alcohol do you typically have >3 drinks per day or >7 drinks per week? No    Do you usually eat at least 4 servings of fruit and vegetables a day, include whole grains & fiber and avoid regularly eating high fat or \"junk\" foods? Yes and YES    Do you have any problems taking medications regularly?  No    Do you have any side effects from medications? none    Needs assistance for the following daily activities: no assistance needed    Which of the following safety concerns are present in your home?  throw rugs in the hallway and lack of grab bars in the bathroom     Hearing impairment: Yes, Find that men's voices are easier to understand than women's.    Concerns about wax build up    In the past 6 months, have you been bothered by leaking of urine? yes    Mental Health:    In general, how would you rate your overall mental or emotional health? excellent  PHQ-2 Score:      Do you feel safe in your environment? Yes    Have you ever done Advance Care Planning? (For example, a Health Directive, POLST, or a discussion with a medical provider or your loved ones about your wishes): No, advance care planning information given to patient to review.  Advanced care planning was discussed at today's visit.    Additional concerns to address?  YES    Fall risk:  Fallen 2 or more times in the past year?: No  Any fall with injury in the past year?: No    Cognitive Screenin) Repeat 3 items " (Leader, Season, Table)    2) Clock draw: NORMAL  3) 3 item recall: Recalls 3 objects  Results: 3 items recalled: COGNITIVE IMPAIRMENT LESS LIKELY    Mini-CogTM Copyright S Kaelyn. Licensed by the author for use in [facility]; reprinted with permission (corrine@.[facility]). All rights reserved.      Do you have sleep apnea, excessive snoring or daytime drowsiness?: no     She had cervical radiculopathy with right arm weakness in July 2018. Neck pain improved after LISA. She went to PT and had great improvement in symptoms.  Tried to stop Celebrex, but had more knee and ankle pain.  Has reduced Tylenol #3 to a couple times per week. Reluctant to use Cymbalta or Effexor as gabapentin as caused dizziness. Stopped Fioricet.  Stable in T#3 use.    Lesion on right cheek present for months. Had AK treated in the past.    Believes she had COVID a month ago. Loss of taste and smell. Had fever, cough, ST, nausea for a few days. Her  was sick as well.  Did not test. Has recovered back to baseline.    L arm bite recently with warm weather. Due for Tdap    Known osteopenia. Took Fosamax for 15 years. Ended around 2015. That was date of last DEXA. Remains on vitamin D.      Reviewed and updated as needed this visit by clinical staff  Tobacco  Allergies  Meds   Med Hx  Surg Hx  Fam Hx  Soc Hx        Reviewed and updated as needed this visit by Provider  Tobacco  Allergies  Meds  Problems  Med Hx  Surg Hx  Fam Hx         Social History     Tobacco Use     Smoking status: Never Smoker     Smokeless tobacco: Never Used   Substance Use Topics     Alcohol use: Yes     Alcohol/week: 1.0 standard drinks     Comment: Alcoholic Drinks/day: once weekly she will have a glass of beer or wine with dinner.                           Current providers sharing in care for this patient include:   Patient Care Team:  Jose Duffy MD as PCP - General (Family Practice)  Jose Duffy MD as Assigned PCP  Fuentes  MD Ashia as Assigned Surgical Provider    The following health maintenance items are reviewed in Epic and correct as of today:  Health Maintenance   Topic Date Due     DTAP/TDAP/TD IMMUNIZATION (1 - Tdap) 02/01/1975     ZOSTER IMMUNIZATION (2 of 3) 09/17/2012     INFLUENZA VACCINE (1) 09/01/2020     LIPID  09/17/2020     MAMMO SCREENING  10/12/2020     MEDICARE ANNUAL WELLNESS VISIT  11/08/2020     FALL RISK ASSESSMENT  02/17/2021     ADVANCE CARE PLANNING  11/08/2024     COLORECTAL CANCER SCREENING  12/10/2025     DEXA  Completed     HEPATITIS C SCREENING  Completed     PHQ-2  Completed     Pneumococcal Vaccine: Pediatrics (0 to 5 Years) and At-Risk Patients (6 to 64 Years)  Completed     Pneumococcal Vaccine: 65+ Years  Completed     IPV IMMUNIZATION  Aged Out     MENINGITIS IMMUNIZATION  Aged Out     HEPATITIS B IMMUNIZATION  Aged Out     Patient Active Problem List   Diagnosis     Closed fracture of proximal end of left humerus with routine healing     History of follicular lymphoma     Right cervical radiculopathy     Tension headache     Hyperlipidemia     Disorder of bone and cartilage     Skin lesion of chest wall     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)     History of chronic dermatitis     Past Surgical History:   Procedure Laterality Date     BIOPSY BREAST      mid 1990s,benign     COLONOSCOPY      age 55,normal 01/01/2005     COLONOSCOPY  12/10/2015    12/10/2015,lnormal--repeat 10 years, 12/10/2025     FUSION CERVICAL ANTERIOR ONE LEVEL  12/2012    C5-6 on left with discectomy     LAMINECTOMY LUMBAR ONE LEVEL      1984,L4-5     LAPAROSCOPIC TUBAL LIGATION      FOR ECTOPIC PREGNANCY     OTHER SURGICAL HISTORY      ,[id number],SKIN BIOPSY,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     OTHER SURGICAL HISTORY      1974,81436.0,ID TX ECTOP PREG BY LAPAROSCOPE,Left,left tube and ovary removed     TONSILLECTOMY      1959       Social History     Tobacco Use     Smoking status: Never  "Smoker     Smokeless tobacco: Never Used   Substance Use Topics     Alcohol use: Yes     Alcohol/week: 1.0 standard drinks     Comment: Alcoholic Drinks/day: once weekly she will have a glass of beer or wine with dinner.     Family History   Problem Relation Age of Onset     Heart Disease Child      Heart Disease Mother         Heart Disease, after cardiac bypass, age 71     Heart Disease Father         Heart Disease,congestive heart failure     Breast Cancer Sister      Hypertension Brother      Diabetes Brother      Breast Cancer Niece      Hypertension Sister      Hypertension Sister      Hypertension Sister      Emphysema Sister      No Known Problems Brother      Heart Disease Brother      Colon Cancer Paternal Half-Brother          Current Outpatient Medications   Medication Sig Dispense Refill     acetaminophen-codeine (TYLENOL #3) 300-30 MG tablet Take 1-2 tablets by mouth daily as needed for severe pain 150 tablet 1     calcium carbonate (CALCIUM CARBONATE) 600 MG tablet Take by mouth 2 times daily       celecoxib (CELEBREX) 200 MG capsule Take 1 capsule (200 mg) by mouth daily with food 90 capsule 3     cholecalciferol (D 2000) 2000 UNITS tablet Take 2,000 Units by mouth daily       No Known Allergies    ROS:  General: Denies general constitutional problems  Eyes: feel plugged with cerumen  Ears/Nose/Throat: Denies problems  Cardiovascular: Denies problems  Respiratory: Denies problems  Gastrointestinal: Denies problems  Genitourinary: Denies problems  Musculoskeletal: as above  Skin: bite as above, skin lesion on face as above  Neurologic: as above  Psychiatric: Denies problems      OBJECTIVE:   There were no vitals taken for this visit. Estimated body mass index is 22.84 kg/m  as calculated from the following:    Height as of 20: 1.55 m (5' 1.02\").    Weight as of 20: 54.9 kg (121 lb).  EXAM:   General Appearance: Pleasant, alert, appropriate appearance for age. No acute distress  Eye Exam:  " Normal external eye, conjunctiva, lids, cornea. FAITH.  Ear Exam: bilateral cerumen impactions, TM not visualized  OroPharynx Exam:  Dental hygiene adequate. Normal buccal mucose. Normal pharynx.  Neck Exam:  Supple, no masses or nodes.  Thyroid Exam: No nodules or enlargement.  Chest/Respiratory Exam: Normal chest wall and respirations. Clear to auscultation.  Breast Exam: Defer  Cardiovascular Exam: Regular rate and rhythm. S1, S2, no murmur, click, gallop, or rubs.  Gastrointestinal Exam: Soft, non-tender, no masses or organomegaly.  Genitourinary Exam Female: no performed  Musculoskeletal Exam: Back is straight and non-tender, full ROM of upper and lower extremities.  Foot Exam: Left and right foot: good pedal pulses.  Skin: small bite namita on left arm  Face with crusted papular lesion, possible AK vs SCC. See photo  Neurologic Exam: Nonfocal, symmetric DTRs, normal gross motor, tone coordination and no tremor.  Psychiatric Exam: Alert and oriented - appropriate affect.      Results for orders placed or performed in visit on 11/09/20   Phosphorus     Status: None   Result Value Ref Range    Phosphorus 4.0 2.5 - 5.0 mg/dL   Comprehensive Metabolic Panel     Status: None   Result Value Ref Range    Sodium 135 134 - 144 mmol/L    Potassium 4.3 3.5 - 5.1 mmol/L    Chloride 99 98 - 107 mmol/L    Carbon Dioxide 28 21 - 31 mmol/L    Anion Gap 8 3 - 14 mmol/L    Glucose 104 70 - 105 mg/dL    Urea Nitrogen 14 7 - 25 mg/dL    Creatinine 0.73 0.60 - 1.20 mg/dL    GFR Estimate 79 >60 mL/min/[1.73_m2]    GFR Estimate If Black >90 >60 mL/min/[1.73_m2]    Calcium 10.0 8.6 - 10.3 mg/dL    Bilirubin Total 0.7 0.3 - 1.0 mg/dL    Albumin 4.8 3.5 - 5.7 g/dL    Protein Total 7.6 6.4 - 8.9 g/dL    Alkaline Phosphatase 44 34 - 104 U/L    ALT 16 7 - 52 U/L    AST 22 13 - 39 U/L   Vitamin D Total     Status: None   Result Value Ref Range    Vitamin D Total 49.1 ng/mL   Magnesium     Status: None   Result Value Ref Range    Magnesium 2.1  1.9 - 2.7 mg/dL   Lipid Panel     Status: Abnormal   Result Value Ref Range    Cholesterol 204 (H) <200 mg/dL    Triglycerides 87 <150 mg/dL    HDL Cholesterol 82 23 - 92 mg/dL    LDL Cholesterol Calculated 105 (H) <100 mg/dL    Non HDL Cholesterol 122 <130 mg/dL          ASSESSMENT / PLAN:       ICD-10-CM    1. Encounter for Medicare annual wellness exam  Z00.00    2. Right cervical radiculopathy  M54.12 acetaminophen-codeine (TYLENOL #3) 300-30 MG tablet     celecoxib (CELEBREX) 200 MG capsule   3. Encounter for monitoring chronic NSAID therapy  Z51.81 Comprehensive Metabolic Panel    Z79.1 Comprehensive Metabolic Panel   4. Osteopenia of multiple sites  M85.89 DX Hip/Pelvis/Spine     Magnesium     Vitamin D Total     Comprehensive Metabolic Panel     Phosphorus     Phosphorus     Comprehensive Metabolic Panel     Vitamin D Total     Magnesium   5. Bilateral impacted cerumen  H61.23 Removal Impacted Cerumen - [facility] ONLY   6. Insect bite of left forearm, initial encounter  S50.862A TDAP VACCINE (Adacel, Boostrix)  [[id number]]    W57.XXXA    7. Lipid screening  Z13.220 Lipid Panel     Lipid Panel   8. Encounter for screening mammogram for malignant neoplasm of breast  Z12.31 MA Screen Bilateral w/Tello   9. Needs flu shot  Z23 GH IMM-  HC FLU VACCINE, INCREASED ANTIGEN, PRESV FREE       Patient has been advised of split billing requirements and indicates understanding: Yes     Chronic cervical radiculopathy. Refilled T#3 for sparing use, last prescription for #150 lasted about 5 months. Given #150 with refill.  PDMP Review       Value Time User    State PDMP site checked  Yes 11/9/2020  9:27 AM Jose Duffy MD         Labs stable for NSAID use. Refilled Celebrex    Osteopenia, due to recheck DEXA. Likely will need endocrinology referral to discuss options given use of Fosamax for 15 years in the past. Will wait on referral until results. Obtained labs.    Nurse removed cerumen via lavage.    COUNSELING:  Reviewed  "preventive health counseling, as reflected in patient instructions       Regular exercise       Healthy diet/nutrition       Vision screening       Hearing screening       Immunizations    Vaccinated for: Influenza and TDAP    Consider Zoster at pharmacy         Colon cancer screening - due 2025       Mammogram ordered       Lipids obtained and look good. ASCVD risk only due to age, no statin needed. CT scan of 0 in Nov 2019.   The 10-year ASCVD risk score (Angel FULLER Jr., et al., 2013) is: 9.4%    Values used to calculate the score:      Age: 70 years      Sex: Female      Is Non- : No      Diabetic: No      Tobacco smoker: No      Systolic Blood Pressure: 132 mmHg      Is BP treated: No      HDL Cholesterol: 82 mg/dL      Total Cholesterol: 204 mg/dL         Estimated body mass index is 22.84 kg/m  as calculated from the following:    Height as of 1/9/20: 1.55 m (5' 1.02\").    Weight as of 2/17/20: 54.9 kg (121 lb).    Weight management plan noted, stable and monitoring    She reports that she has never smoked. She has never used smokeless tobacco.    Appropriate preventive services were discussed with this patient, including applicable screening as appropriate for cardiovascular disease, diabetes, osteopenia/osteoporosis, and glaucoma.  As appropriate for age/gender, discussed screening for colorectal cancer, prostate cancer, breast cancer, and cervical cancer. Checklist reviewing preventive services available has been given to the patient.    Reviewed patients plan of care and provided an AVS. The Basic Care Plan (routine screening as documented in Health Maintenance) for Corinne meets the Care Plan requirement. This Care Plan has been established and reviewed with the Patient.    Counseling Resources:  ATP IV Guidelines  Pooled Cohorts Equation Calculator  Breast Cancer Risk Calculator  BRCA-Related Cancer Risk Assessment: FHS-7 Tool  FRAX Risk Assessment  ICSI Preventive " Guidelines  Dietary Guidelines for Americans, 2010  USDA's MyPlate  ASA Prophylaxis  Lung CA Screening    Jose Duffy MD  [facility] AND [facility]

## 2020-11-09 NOTE — NURSING NOTE
Immunization Documentation    Prior to Immunization administration, verified patients identity using patient's name and date of birth. Please see IMMUNIZATIONS  and order for additional information.  Patient / Parent instructed to remain in clinic for 15 minutes and report any adverse reaction to staff immediately.    Was entire vial of medication used? Yes  Vial/Syringe: Single dose vial    Alesha Ortiz LPN  11/9/2020   9:26 AM

## 2020-11-09 NOTE — NURSING NOTE
"Patient presents to the clinic for medicare wellness.  Last administration of Tylenol #3 was 3 days ago, patient will take 2-4 tabs per week on average.      Chief Complaint   Patient presents with     Wellness Visit     medicare       Initial /78 (BP Location: Right arm, Patient Position: Sitting, Cuff Size: Adult Regular)   Pulse 92   Temp 97.8  F (36.6  C) (Tympanic)   Resp 20   Ht 1.556 m (5' 1.25\")   Wt 53.6 kg (118 lb 4 oz)   BMI 22.16 kg/m   Estimated body mass index is 22.16 kg/m  as calculated from the following:    Height as of this encounter: 1.556 m (5' 1.25\").    Weight as of this encounter: 53.6 kg (118 lb 4 oz).  Medication Reconciliation: complete    Alesha Ortiz LPN    "

## 2020-11-09 NOTE — PATIENT INSTRUCTIONS
Tetanus and pertussis booster  Mammogram and DEXA scan - radiology will call  Refilled medication  Likely endocrinology opinion on bone density      Patient Education   Personalized Prevention Plan  You are due for the preventive services outlined below.  Your care team is available to assist you in scheduling these services.  If you have already completed any of these items, please share that information with your care team to update in your medical record.  Health Maintenance Due   Topic Date Due     Diptheria Tetanus Pertussis (DTAP/TDAP/TD) Vaccine (1 - Tdap) 02/01/1975     Zoster (Shingles) Vaccine (2 of 3) 09/17/2012     Flu Vaccine (1) 09/01/2020     Cholesterol Lab  09/17/2020     Mammogram  10/12/2020

## 2020-11-10 ASSESSMENT — ANXIETY QUESTIONNAIRES: GAD7 TOTAL SCORE: 0

## 2020-11-17 ENCOUNTER — OFFICE VISIT (OUTPATIENT)
Dept: SURGERY | Facility: OTHER | Age: 70
End: 2020-11-17
Attending: SURGERY
Payer: MEDICARE

## 2020-11-17 VITALS
RESPIRATION RATE: 16 BRPM | TEMPERATURE: 98.2 F | OXYGEN SATURATION: 99 % | WEIGHT: 118 LBS | BODY MASS INDEX: 22.11 KG/M2 | HEART RATE: 90 BPM | SYSTOLIC BLOOD PRESSURE: 120 MMHG | DIASTOLIC BLOOD PRESSURE: 68 MMHG

## 2020-11-17 DIAGNOSIS — L98.9 SKIN LESION OF FACE: Primary | ICD-10-CM

## 2020-11-17 PROCEDURE — 11641 EXC F/E/E/N/L MAL+MRG 0.6-1: CPT | Performed by: SURGERY

## 2020-11-17 PROCEDURE — G0463 HOSPITAL OUTPT CLINIC VISIT: HCPCS | Mod: 25

## 2020-11-17 PROCEDURE — 11441 EXC FACE-MM B9+MARG 0.6-1 CM: CPT | Performed by: SURGERY

## 2020-11-17 PROCEDURE — 88305 TISSUE EXAM BY PATHOLOGIST: CPT

## 2020-11-17 ASSESSMENT — PAIN SCALES - GENERAL: PAINLEVEL: NO PAIN (0)

## 2020-11-17 NOTE — NURSING NOTE
"Chief Complaint   Patient presents with     Procedure     skin lesion on right cheek       Initial /68 (BP Location: Left arm, Patient Position: Sitting, Cuff Size: Adult Regular)   Pulse 90   Temp 98.2  F (36.8  C) (Tympanic)   Resp 16   Wt 53.5 kg (118 lb)   SpO2 99%   BMI 22.11 kg/m   Estimated body mass index is 22.11 kg/m  as calculated from the following:    Height as of 11/9/20: 1.556 m (5' 1.25\").    Weight as of this encounter: 53.5 kg (118 lb).  Medication Reconciliation: complete    Alesha Lo LPN      "

## 2020-11-17 NOTE — NURSING NOTE
Prior to the start of the procedure and with procedural staff participation, I verbally confirmed the patient s identity using two indicators, relevant allergies, that the procedure was appropriate and matched the consent or emergent situation, and that the correct equipment/implants were available. Immediately prior to starting the procedure I conducted the Time Out with the procedural staff and re-confirmed the patient s name, procedure, and site/side. (The Joint Commission universal protocol was followed.)  Yes    Sedation (Moderate or Deep): None  Alesha Lo LPN .......11/17/2020 2:03 PM

## 2020-11-17 NOTE — PATIENT INSTRUCTIONS
Your incision was closed with stitches that will dissolve.     It is ok to get the incision wet in the shower on the day after your procedure.     Don't soak in a tub, pool or lake for 5 days. The small butterfly strips will start to peel off in 5-7 days it is ok to remove them.     If you have concerns, please call.

## 2020-11-17 NOTE — PROGRESS NOTES
SUBJECTIVE:  70 year old female presents for lesion removal. This lesion has been present for almost a year. It keeps getting larger.    OBJECTIVE:  /68 (BP Location: Left arm, Patient Position: Sitting, Cuff Size: Adult Regular)   Pulse 90   Temp 98.2  F (36.8  C) (Tympanic)   Resp 16   Wt 53.5 kg (118 lb)   SpO2 99%   BMI 22.11 kg/m    General: no acute distress  Skin: right cheek along the jawline with 0.6 cm rough scaly lesion    ASSESSMENT:  Lesion size: as above  Defect size: lesion and margin : 1.0 x 0.9 x 0.2 cm-simple closure    PROCEDURE:  The pathophysiology of skin lesions and skin cancers was discussed with the patient. The risks, benefits and alternatives to excision of the lesion were discussed with the patient, including the risks of infection,scarring, bruising, bleeding and the possible need for further procedures. The patient expressed understanding and wishes to proceed.    betadine was used to cleanse the skinin the area of the lesion. 1% Lidocaine with epinephrine was infiltrated in the skin and subcutaneous tissue in the area of the lesion. When appropriate anesthesia had been achieved, the lesion was sharply excised with a margin of grossly normal tissue. The wound was closed using 5-0 Monocryl. Sterile dressing was applied. The specimen was labelled and sent to pathology for evaluation. The procedure was well tolerated without complications. Patient was given post procedure instructions and denied further questions. We will call the patient with pathology results.

## 2020-12-15 ENCOUNTER — HOSPITAL ENCOUNTER (OUTPATIENT)
Dept: BONE DENSITY | Facility: OTHER | Age: 70
End: 2020-12-15
Attending: FAMILY MEDICINE
Payer: MEDICARE

## 2020-12-15 ENCOUNTER — HOSPITAL ENCOUNTER (OUTPATIENT)
Dept: MAMMOGRAPHY | Facility: OTHER | Age: 70
End: 2020-12-15
Attending: FAMILY MEDICINE
Payer: MEDICARE

## 2020-12-15 DIAGNOSIS — M85.89 OSTEOPENIA OF MULTIPLE SITES: ICD-10-CM

## 2020-12-15 DIAGNOSIS — Z12.31 ENCOUNTER FOR SCREENING MAMMOGRAM FOR MALIGNANT NEOPLASM OF BREAST: ICD-10-CM

## 2020-12-15 PROCEDURE — 77063 BREAST TOMOSYNTHESIS BI: CPT

## 2020-12-15 PROCEDURE — 77080 DXA BONE DENSITY AXIAL: CPT

## 2020-12-16 ENCOUNTER — MYC MEDICAL ADVICE (OUTPATIENT)
Dept: FAMILY MEDICINE | Facility: OTHER | Age: 70
End: 2020-12-16

## 2020-12-16 DIAGNOSIS — M85.89 OSTEOPENIA OF MULTIPLE SITES: Primary | ICD-10-CM

## 2021-01-14 ENCOUNTER — MYC MEDICAL ADVICE (OUTPATIENT)
Dept: FAMILY MEDICINE | Facility: OTHER | Age: 71
End: 2021-01-14

## 2021-01-15 ENCOUNTER — TELEPHONE (OUTPATIENT)
Dept: FAMILY MEDICINE | Facility: OTHER | Age: 71
End: 2021-01-15

## 2021-01-15 NOTE — TELEPHONE ENCOUNTER
Registration left a voicemail to call back about referral.  Registration will call her at this time.      Alesha Ortiz LPN 1/15/2021 10:57 AM

## 2021-01-19 ENCOUNTER — OFFICE VISIT (OUTPATIENT)
Dept: SURGERY | Facility: OTHER | Age: 71
End: 2021-01-19
Attending: SURGERY
Payer: MEDICARE

## 2021-01-19 VITALS
WEIGHT: 119 LBS | DIASTOLIC BLOOD PRESSURE: 82 MMHG | SYSTOLIC BLOOD PRESSURE: 138 MMHG | RESPIRATION RATE: 16 BRPM | TEMPERATURE: 98.2 F | HEART RATE: 84 BPM | BODY MASS INDEX: 22.3 KG/M2 | OXYGEN SATURATION: 98 %

## 2021-01-19 DIAGNOSIS — Z85.828 HISTORY OF SCC (SQUAMOUS CELL CARCINOMA) OF SKIN: ICD-10-CM

## 2021-01-19 DIAGNOSIS — L98.9 FACIAL SKIN LESION: Primary | ICD-10-CM

## 2021-01-19 DIAGNOSIS — L98.9 SKIN LESION OF SCALP: ICD-10-CM

## 2021-01-19 PROCEDURE — 11620 EXC H-F-NK-SP MAL+MARG 0.5/<: CPT | Performed by: SURGERY

## 2021-01-19 PROCEDURE — 11441 EXC FACE-MM B9+MARG 0.6-1 CM: CPT | Performed by: SURGERY

## 2021-01-19 PROCEDURE — G0463 HOSPITAL OUTPT CLINIC VISIT: HCPCS | Mod: 25

## 2021-01-19 PROCEDURE — 88305 TISSUE EXAM BY PATHOLOGIST: CPT

## 2021-01-19 ASSESSMENT — PAIN SCALES - GENERAL: PAINLEVEL: NO PAIN (0)

## 2021-01-19 NOTE — NURSING NOTE
"Chief Complaint   Patient presents with     RECHECK     f/u skin lesions       Initial /82 (BP Location: Right arm, Patient Position: Sitting, Cuff Size: Adult Regular)   Pulse 84   Temp 98.2  F (36.8  C) (Tympanic)   Resp 16   Wt 54 kg (119 lb)   SpO2 98%   BMI 22.30 kg/m   Estimated body mass index is 22.3 kg/m  as calculated from the following:    Height as of 11/9/20: 1.556 m (5' 1.25\").    Weight as of this encounter: 54 kg (119 lb).  Medication Reconciliation: complete    Alesha Lo LPN    "

## 2021-01-19 NOTE — PROGRESS NOTES
SUBJECTIVE:  70 year old female presents for lesion removal. This lesion has been present for a couple of weeks at the scar from her previous removal 11/2020. She also notes a new nodule on her scalp-maybe close to the spot she has had a previous excision years ago.    OBJECTIVE:  /82 (BP Location: Right arm, Patient Position: Sitting, Cuff Size: Adult Regular)   Pulse 84   Temp 98.2  F (36.8  C) (Tympanic)   Resp 16   Wt 54 kg (119 lb)   SpO2 98%   BMI 22.30 kg/m    General: no acute distress  Skin: right cheek- 0.8 cm scar with crust at site of previous ssc excision, scalp with 0.3 cm rough, crusty nodule distant from previous scar by more than 1 cm    ASSESSMENT:  Lesion size: as above  Defect size: lesion and margin : cheek- 1.5 x 0.0.8 x 0.3 cm; scalp - 0.5 x 0.5 x 0.3 cm both with simple closure    PROCEDURE:  The pathophysiology of skin lesions and skin cancers was discussed with the patient. The risks, benefits and alternatives to excision of the lesions were discussed with the patient, including the risks of infection,scarring, bruising, bleeding and the possible need for further procedures. The patient expressed understanding and wishes to proceed.    Chloraprep was used to cleanse the skinin the area of the lesions. 1% Lidocaine with epinephrine was infiltrated in the skin and subcutaneous tissue in the area of the lesions. When appropriate anesthesia had been achieved, the lesion on the right cheek was sharply excised with a margin of grossly normal tissue. The wound was closed using 4-0 Monocryl. Sterile dressing was applied. The specimen was labelled and sent to pathology for evaluation. Attention was then turned to the scalp lesion. The lesion was sharply excised with a margin of grossly normal tissue. The wound was closed using 4-0 Monocryl. The specimen was labelled and sent to pathology for evaluation.  The procedure was well tolerated without complications. Patient was given post  procedure instructions and denied further questions. We will call the patient with pathology results.

## 2021-01-19 NOTE — PATIENT INSTRUCTIONS
Your incision was closed with stitches that will disolve. It is ok to remove the dressing and get the incision wet in the shower on the day after your procedure. Don't soak in a tub, pool or lakefor 5 days. The small butterfly strips will start to peel off in 3-5 days it is ok to remove them. We will call you with results. If you have concerns, please call.

## 2021-01-19 NOTE — NURSING NOTE
Prior to the start of the procedure and with procedural staff participation, I verbally confirmed the patient s identity using two indicators, relevant allergies, that the procedure was appropriate and matched the consent or emergent situation, and that the correct equipment/implants were available. Immediately prior to starting the procedure I conducted the Time Out with the procedural staff and re-confirmed the patient s name, procedure, and site/side. (The Joint Commission universal protocol was followed.)  Yes    Sedation (Moderate or Deep): None  Alesha Lo LPN .......1/19/2021 10:58 AM

## 2021-01-25 ENCOUNTER — TRANSFERRED RECORDS (OUTPATIENT)
Dept: HEALTH INFORMATION MANAGEMENT | Facility: OTHER | Age: 71
End: 2021-01-25

## 2021-03-10 DIAGNOSIS — M81.0 AGE-RELATED OSTEOPOROSIS WITHOUT CURRENT PATHOLOGICAL FRACTURE: ICD-10-CM

## 2021-03-10 RX ORDER — ACETAMINOPHEN 325 MG/1
650 TABLET ORAL ONCE
Status: CANCELLED
Start: 2021-03-10 | End: 2021-03-10

## 2021-03-10 RX ORDER — ZOLEDRONIC ACID 5 MG/100ML
5 INJECTION, SOLUTION INTRAVENOUS ONCE
Status: CANCELLED
Start: 2021-03-10 | End: 2021-03-10

## 2021-03-17 ENCOUNTER — HOSPITAL ENCOUNTER (OUTPATIENT)
Dept: INFUSION THERAPY | Facility: OTHER | Age: 71
Discharge: HOME | End: 2021-03-17
Attending: FAMILY MEDICINE | Admitting: FAMILY MEDICINE
Payer: MEDICARE

## 2021-03-17 VITALS
TEMPERATURE: 97.3 F | DIASTOLIC BLOOD PRESSURE: 74 MMHG | SYSTOLIC BLOOD PRESSURE: 130 MMHG | HEART RATE: 72 BPM | RESPIRATION RATE: 16 BRPM

## 2021-03-17 DIAGNOSIS — M81.0 AGE-RELATED OSTEOPOROSIS WITHOUT CURRENT PATHOLOGICAL FRACTURE: Primary | ICD-10-CM

## 2021-03-17 PROCEDURE — 250N000013 HC RX MED GY IP 250 OP 250 PS 637: Mod: GY | Performed by: FAMILY MEDICINE

## 2021-03-17 PROCEDURE — 96365 THER/PROPH/DIAG IV INF INIT: CPT

## 2021-03-17 PROCEDURE — 250N000011 HC RX IP 250 OP 636: Performed by: FAMILY MEDICINE

## 2021-03-17 PROCEDURE — 258N000003 HC RX IP 258 OP 636: Performed by: FAMILY MEDICINE

## 2021-03-17 RX ORDER — ACETAMINOPHEN 325 MG/1
650 TABLET ORAL ONCE
Status: CANCELLED
Start: 2021-03-17 | End: 2021-03-17

## 2021-03-17 RX ORDER — ZOLEDRONIC ACID 5 MG/100ML
5 INJECTION, SOLUTION INTRAVENOUS ONCE
Status: CANCELLED
Start: 2021-03-17 | End: 2021-03-17

## 2021-03-17 RX ORDER — ACETAMINOPHEN 325 MG/1
650 TABLET ORAL ONCE
Status: COMPLETED | OUTPATIENT
Start: 2021-03-17 | End: 2021-03-17

## 2021-03-17 RX ORDER — ZOLEDRONIC ACID 5 MG/100ML
5 INJECTION, SOLUTION INTRAVENOUS ONCE
Status: COMPLETED | OUTPATIENT
Start: 2021-03-17 | End: 2021-03-17

## 2021-03-17 RX ADMIN — ZOLEDRONIC ACID 5 MG: 5 SOLUTION INTRAVENOUS at 08:22

## 2021-03-17 RX ADMIN — ACETAMINOPHEN 650 MG: 325 TABLET ORAL at 08:12

## 2021-03-17 RX ADMIN — SODIUM CHLORIDE 250 ML: 9 INJECTION, SOLUTION INTRAVENOUS at 08:22

## 2021-03-17 NOTE — PROGRESS NOTES
Infusion Nursing Note:  Corinne G Hultman presents today for Reclast 1st time.    Patient seen by provider today: No   present during visit today: Not Applicable.    Note: N/A.    Intravenous Access:  Peripheral IV placed.    Treatment Conditions:  Not Applicable.    Post Infusion Assessment:  Patient tolerated infusion without incident.  Patient observed for 30 minutes post reclast per protocol.  Blood return noted pre and post infusion.  Site patent and intact, free from redness, edema or discomfort.  No evidence of extravasations.  Access discontinued per protocol.     Discharge Plan:   Patient and/or family verbalized understanding of discharge instructions and all questions answered.  Copy of AVS reviewed with patient and/or family.    Patient discharged in stable condition accompanied by: self.  Departure Mode: Ambulatory.    Brenda J. Goodell, RN

## 2021-03-31 DIAGNOSIS — M81.0 AGE RELATED OSTEOPOROSIS: ICD-10-CM

## 2021-03-31 LAB
CALCIUM SERPL-MCNC: 9.5 MG/DL (ref 8.6–10.3)
CREAT SERPL-MCNC: 0.74 MG/DL (ref 0.6–1.2)
GFR SERPL CREATININE-BSD FRML MDRD: 77 ML/MIN/{1.73_M2}

## 2021-03-31 PROCEDURE — 36415 COLL VENOUS BLD VENIPUNCTURE: CPT | Mod: ZL | Performed by: PHYSICIAN ASSISTANT

## 2021-03-31 PROCEDURE — 82310 ASSAY OF CALCIUM: CPT | Mod: ZL | Performed by: PHYSICIAN ASSISTANT

## 2021-03-31 PROCEDURE — 82565 ASSAY OF CREATININE: CPT | Mod: ZL | Performed by: PHYSICIAN ASSISTANT

## 2021-05-24 DIAGNOSIS — M54.12 RIGHT CERVICAL RADICULOPATHY: Primary | ICD-10-CM

## 2021-05-25 NOTE — TELEPHONE ENCOUNTER
"[facility] sent Rx request for the following:      Requested Prescriptions   Pending Prescriptions Disp Refills   acetaminophen-codeine (TYLENOL #3) 300-30 MG tablet [Pharmacy Med Name: ACETAMINOPHEN/COD #3 (300/30MG) TAB] 150 tablet     Sig: TAKE 1 TO 2 TABLETS BY MOUTH DAILY AS NEEDED FOR SEVERE PAIN   Last Prescription Date:   11/9/20  Last Fill Qty/Refills:         150, R-1    Last Office Visit:              11/9/20   Future Office visit:             Routing refill request to provider for review/approval because:  Drug not on the [facility], UMP or  Health refill protocol or controlled substance       Per LOV note: \"Return in about 53 weeks (around 11/15/2021) for Annual Wellness Visit.\"    Unable to complete prescription refill per RN Medication Refill Policy. Cece Johansen RN .............. 5/25/2021  2:41 PM    "

## 2021-05-26 NOTE — TELEPHONE ENCOUNTER
Last refill in Nov 2020. Given 14 pills on 5/7. Refilled for 150 more.  PDMP Review       Value Time User    State PDMP site checked  Yes 5/26/2021  8:52 AM Jose Duffy MD

## 2021-10-03 ENCOUNTER — HEALTH MAINTENANCE LETTER (OUTPATIENT)
Age: 71
End: 2021-10-03

## 2021-11-01 DIAGNOSIS — M54.12 RIGHT CERVICAL RADICULOPATHY: ICD-10-CM

## 2021-11-03 RX ORDER — CELECOXIB 200 MG/1
CAPSULE ORAL
Qty: 90 CAPSULE | Refills: 0 | Status: SHIPPED | OUTPATIENT
Start: 2021-11-03 | End: 2022-01-05

## 2021-11-03 NOTE — TELEPHONE ENCOUNTER
[facility] sent Rx request for the following:      Requested Prescriptions   Pending Prescriptions Disp Refills     celecoxib (CELEBREX) 200 MG capsule [Pharmacy Med Name: CELECOXIB 200MG CAPSULES] 90 capsule 3     Sig: TAKE 1 CAPSULE(200 MG) BY MOUTH DAILY WITH FOOD   Last Prescription Date:   11/9/20  Last Fill Qty/Refills:         90, R-3    Last Office Visit:              11/9/20   Future Office visit:             Next 5 appointments (look out 90 days)    Nov 23, 2021  9:40 AM  PHYSICAL with Jose Duffy MD  [facility] ([facility] ) [address]  [place] [id number] [phone]      Routing refill request to provider for review/approval because:    NSAID Medications Failed - 11/3/2021  9:45 AM        Failed - Patient is age 6-64 years        Failed - Normal CBC on file in past 12 months     Unable to complete prescription refill per RN Medication Refill Policy. Cece Johansen RN .............. 11/3/2021  9:48 AM

## 2021-11-22 DIAGNOSIS — M54.12 RIGHT CERVICAL RADICULOPATHY: ICD-10-CM

## 2021-11-22 NOTE — TELEPHONE ENCOUNTER
Requested Prescriptions   Pending Prescriptions Disp Refills     acetaminophen-codeine (TYLENOL #3) 300-30 MG tablet [Pharmacy Med Name: ACETAMINOPHEN/COD #3 (300/30MG) TAB] 150 tablet      Sig: TAKE 1 TO 2 TABLETS BY MOUTH DAILY AS NEEDED FOR SEVERE PAIN       There is no refill protocol information for this order        [facility] sent Rx request for the following: acetaminophen-codeine (TYLENOL #3) 300-30 MG tablet [Pharmacy Med Name: ACETAMINOPHEN/COD #3 (300/30MG) TAB]  Sig TAKE 1 TO 2 TABLETS BY MOUTH DAILY AS NEEDED FOR SEVERE PAIN    Last Prescription Date:   5/26/21  Last Fill Qty/Refills:         120, R-1    Last Office Visit:              11/9/20   Future Office visit:           1/2/22    Routing refill request to provider for review/approval because:  Drug not on the [facility], UMP or [facility] refill protocol or controlled substance        Jade Mckeon RN on 11/22/2021 at 3:49 PM

## 2021-11-24 NOTE — TELEPHONE ENCOUNTER
PDMP Review       Value Time User    State PDMP site checked  Yes 11/24/2021 12:41 PM Jose Duffy MD

## 2022-01-05 ENCOUNTER — OFFICE VISIT (OUTPATIENT)
Dept: FAMILY MEDICINE | Facility: OTHER | Age: 72
End: 2022-01-05
Attending: FAMILY MEDICINE
Payer: COMMERCIAL

## 2022-01-05 VITALS
RESPIRATION RATE: 18 BRPM | DIASTOLIC BLOOD PRESSURE: 82 MMHG | BODY MASS INDEX: 22.66 KG/M2 | TEMPERATURE: 97.5 F | HEART RATE: 84 BPM | OXYGEN SATURATION: 95 % | HEIGHT: 61 IN | SYSTOLIC BLOOD PRESSURE: 134 MMHG | WEIGHT: 120 LBS

## 2022-01-05 DIAGNOSIS — M54.12 LEFT CERVICAL RADICULOPATHY: ICD-10-CM

## 2022-01-05 DIAGNOSIS — Z13.220 LIPID SCREENING: ICD-10-CM

## 2022-01-05 DIAGNOSIS — Z86.16 HISTORY OF COVID-19: ICD-10-CM

## 2022-01-05 DIAGNOSIS — Z00.00 ENCOUNTER FOR MEDICARE ANNUAL WELLNESS EXAM: Primary | ICD-10-CM

## 2022-01-05 DIAGNOSIS — L57.0 ACTINIC KERATOSIS: ICD-10-CM

## 2022-01-05 DIAGNOSIS — M54.16 LEFT LUMBAR RADICULOPATHY: ICD-10-CM

## 2022-01-05 DIAGNOSIS — Z23 NEED FOR PROPHYLACTIC VACCINATION AND INOCULATION AGAINST INFLUENZA: ICD-10-CM

## 2022-01-05 DIAGNOSIS — M81.0 AGE-RELATED OSTEOPOROSIS WITHOUT CURRENT PATHOLOGICAL FRACTURE: ICD-10-CM

## 2022-01-05 DIAGNOSIS — C82.01 GRADE 1 FOLLICULAR LYMPHOMA OF LYMPH NODES OF NECK (H): ICD-10-CM

## 2022-01-05 DIAGNOSIS — M54.12 RIGHT CERVICAL RADICULOPATHY: ICD-10-CM

## 2022-01-05 LAB
CHOLEST SERPL-MCNC: 217 MG/DL
FASTING STATUS PATIENT QL REPORTED: ABNORMAL
HDLC SERPL-MCNC: 90 MG/DL (ref 23–92)
LDLC SERPL CALC-MCNC: 111 MG/DL
NONHDLC SERPL-MCNC: 127 MG/DL
TRIGL SERPL-MCNC: 79 MG/DL

## 2022-01-05 PROCEDURE — G0439 PPPS, SUBSEQ VISIT: HCPCS | Performed by: FAMILY MEDICINE

## 2022-01-05 PROCEDURE — 80061 LIPID PANEL: CPT | Mod: ZL | Performed by: FAMILY MEDICINE

## 2022-01-05 PROCEDURE — 99213 OFFICE O/P EST LOW 20 MIN: CPT | Mod: 25 | Performed by: FAMILY MEDICINE

## 2022-01-05 PROCEDURE — G0463 HOSPITAL OUTPT CLINIC VISIT: HCPCS | Mod: 25

## 2022-01-05 PROCEDURE — 36415 COLL VENOUS BLD VENIPUNCTURE: CPT | Mod: ZL | Performed by: FAMILY MEDICINE

## 2022-01-05 PROCEDURE — 86769 SARS-COV-2 COVID-19 ANTIBODY: CPT | Mod: ZL | Performed by: FAMILY MEDICINE

## 2022-01-05 PROCEDURE — G0008 ADMIN INFLUENZA VIRUS VAC: HCPCS

## 2022-01-05 RX ORDER — CELECOXIB 200 MG/1
200 CAPSULE ORAL DAILY
Qty: 90 CAPSULE | Refills: 4 | Status: SHIPPED | OUTPATIENT
Start: 2022-01-05 | End: 2023-01-30

## 2022-01-05 ASSESSMENT — MIFFLIN-ST. JEOR: SCORE: 996.7

## 2022-01-05 ASSESSMENT — ACTIVITIES OF DAILY LIVING (ADL): CURRENT_FUNCTION: NO ASSISTANCE NEEDED

## 2022-01-05 ASSESSMENT — PAIN SCALES - GENERAL: PAINLEVEL: MILD PAIN (2)

## 2022-01-05 NOTE — NURSING NOTE
"Patient presents to the clinic for medicare wellness.    FOOD SECURITY SCREENING QUESTIONS:    The next two questions are to help us understand your food security.  If you are feeling you need any assistance in this area, we have resources available to support you today.    Hunger Vital Signs:  Within the past 12 months we worried whether our food would run out before we got money to buy more. Never  Within the past 12 months the food we bought just didn't last and we didn't have money to get more. Never    Advance Care Directive on file? no  Advance Care Directive provided to patient? Has documents at home.  Chief Complaint   Patient presents with     Medicare Visit     Imm/Inj     Flu Shot       Initial /82 (BP Location: Right arm, Patient Position: Sitting, Cuff Size: Adult Regular)   Pulse 84   Temp 97.5  F (36.4  C) (Tympanic)   Resp 18   Ht 1.549 m (5' 1\")   Wt 54.4 kg (120 lb)   SpO2 95%   BMI 22.67 kg/m   Estimated body mass index is 22.67 kg/m  as calculated from the following:    Height as of this encounter: 1.549 m (5' 1\").    Weight as of this encounter: 54.4 kg (120 lb).  Medication Reconciliation: complete    Immunization Documentation    Prior to Immunization administration, verified patients identity using patient's name and date of birth. Please see IMMUNIZATIONS  and order for additional information.  Patient / Parent instructed to remain in clinic for 15 minutes and report any adverse reaction to staff immediately.    Was entire vial of medication used? Yes  Vial/Syringe: Riley Ortiz LPN  1/5/2022   9:23 AM       "

## 2022-01-05 NOTE — PROGRESS NOTES
"    SUBJECTIVE:   Corinne G Hultman is a 71 year old female who presents for Preventive Visit.  Patient has been advised of split billing requirements and indicates understanding: Yes   Are you in the first 12 months of your Medicare coverage?  No    Answers for HPI/ROS submitted by the patient on 1/5/2022  In general, how would you rate your overall physical health?: good  Frequency of exercise:: 4-5 days/week  Do you usually eat at least 4 servings of fruit and vegetables a day, include whole grains & fiber, and avoid regularly eating high fat or \"junk\" foods? : Yes  Taking medications regularly:: Yes  Medication side effects:: Not applicable  Activities of Daily Living: no assistance needed  Home safety: throw rugs in the hallway  Hearing Impairment:: find that men's voices are easier to understand than woman's, difficulty understanding soft or whispered speech  In the past 6 months, have you been bothered by leaking of urine?: Yes  In general, how would you rate your overall mental or emotional health?: good  Additional concerns today:: Yes  Duration of exercise:: 30-45 minutes          HPI  Do you feel safe in your environment? Yes    Have you ever done Advance Care Planning? (For example, a Health Directive, POLST, or a discussion with a medical provider or your loved ones about your wishes): No, advance care planning information given to patient to review.  Advanced care planning was discussed at today's visit.       Fall risk  Fallen 2 or more times in the past year?: No  Any fall with injury in the past year?: No    Cognitive Screening   1) Repeat 3 items (Leader, Season, Table)    2) Clock draw: NORMAL  3) 3 item recall: Recalls 3 objects  Results: 3 items recalled: COGNITIVE IMPAIRMENT LESS LIKELY    Mini-CogTM Copyright S Kaelyn. Licensed by the author for use in [employer]; reprinted with permission (corrine@.edu). All rights reserved.      Do you have sleep apnea, excessive snoring or " daytime drowsiness?: Yes- daytime drowsiness when not sleeping well at night.      She had cervical radiculopathy with right arm weakness in July 2018. Neck pain improved after LISA. She went to PT and had great improvement in symptoms.  Tried to stop Celebrex, but had more knee and ankle pain.  Has reduced Tylenol #3 to a couple times per week. Reluctant to use Cymbalta or Effexor as gabapentin as caused dizziness. Stopped Fioricet.  Stable in T#3 use over past 2 years.    Known osteopenia. Took Fosamax for 15 years. Ended around 2015. DEXA in 2020 low. Consulted with endocrinology. Given Reclast infusion March 17, 2021. Tolerated fine. Has follow up with endocrinology in February 2022.    History of follicular lymphoma. Seeing Dr Cuenca in February 2022.    CT calcium score of 0 November 2019.    Pain in left thigh and knee at times. Worse after walking 2 miles    Left arm numbness and tingling recently. MRI cervical spine in 2018 with anterolisthesis, facet arthropathy, and neural foraminal stenosis.        Reviewed and updated as needed this visit by clinical staff  Tobacco  Allergies  Meds  Problems  Med Hx  Surg Hx  Fam Hx  Soc Hx         Reviewed and updated as needed this visit by Provider  Tobacco  Allergies  Meds  Problems  Med Hx  Surg Hx  Fam Hx        Social History     Tobacco Use     Smoking status: Never Smoker     Smokeless tobacco: Never Used   Substance Use Topics     Alcohol use: Not Currently     Alcohol/week: 1.0 standard drink     Comment:  once weekly she will have a glass of beer or wine at holiday gatherings     If you drink alcohol do you typically have >3 drinks per day or >7 drinks per week? No    Alcohol Use 1/5/2022   Prescreen: >3 drinks/day or >7 drinks/week? No   Prescreen: >3 drinks/day or >7 drinks/week? -           Current providers sharing in care for this patient include:   Patient Care Team:  Jose Duffy MD as PCP - General (Family Practice)  Tati  Jose DALEY MD as Assigned PCP  Ashia Dill MD as Assigned Surgical Provider    The following health maintenance items are reviewed in Epic and correct as of today:  Health Maintenance Due   Topic Date Due     COVID-19 Vaccine (1) Never done     FALL RISK ASSESSMENT  2022     Patient Active Problem List   Diagnosis     Closed fracture of proximal end of left humerus with routine healing     History of follicular lymphoma     Right cervical radiculopathy     Tension headache     Hyperlipidemia     Disorder of bone and cartilage     Skin lesion of chest wall     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)     History of chronic dermatitis     Age-related osteoporosis without current pathological fracture     Past Surgical History:   Procedure Laterality Date     BIOPSY BREAST      mid ,benign     COLONOSCOPY      age 55,normal 2005     COLONOSCOPY  12/10/2015    12/10/2015,lnormal--repeat 10 years, 12/10/2025     FUSION CERVICAL ANTERIOR ONE LEVEL  2012    C5-6 on left with discectomy     LAMINECTOMY LUMBAR ONE LEVEL      ,L4-5     LAPAROSCOPIC TUBAL LIGATION      FOR ECTOPIC PREGNANCY     OTHER SURGICAL HISTORY      ,IQF936,SKIN BIOPSY,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     OTHER SURGICAL HISTORY      ,51390.0,MS TX ECTOP PREG BY LAPAROSCOPE,Left,left tube and ovary removed     TONSILLECTOMY             Social History     Tobacco Use     Smoking status: Never Smoker     Smokeless tobacco: Never Used   Substance Use Topics     Alcohol use: Not Currently     Alcohol/week: 1.0 standard drink     Comment:  once weekly she will have a glass of beer or wine at holiday gatherings     Family History   Problem Relation Age of Onset     Heart Disease Child      Heart Disease Mother         Heart Disease, after cardiac bypass, age 71     Heart Disease Father         Heart Disease,congestive heart failure     Breast Cancer Sister      Hypertension Brother       "Diabetes Brother      Breast Cancer Niece      Hypertension Sister      Hypertension Sister      Hypertension Sister      Emphysema Sister      No Known Problems Brother      Heart Disease Brother      Colon Cancer Paternal Half-Brother          Current Outpatient Medications   Medication Sig Dispense Refill     acetaminophen-codeine (TYLENOL #3) 300-30 MG tablet Take 1-2 tablets by mouth every 6 hours as needed for severe pain 150 tablet 0     calcium carbonate (CALCIUM CARBONATE) 600 MG tablet Take by mouth 2 times daily       celecoxib (CELEBREX) 200 MG capsule Take 1 capsule (200 mg) by mouth daily 90 capsule 4     cholecalciferol (D 2000) 2000 UNITS tablet Take 2,000 Units by mouth daily       No Known Allergies        Review of Systems  General: Denies general constitutional problems  Eyes: Denies problems  Ears/Nose/Throat: Denies problems  Cardiovascular: Denies problems  Respiratory: Denies problems  Gastrointestinal: Denies problems  Genitourinary: Denies problems  Musculoskeletal: Denies problems  Skin: Denies problems  Neurologic: Denies problems  Psychiatric: Denies problems      OBJECTIVE:   /82 (BP Location: Right arm, Patient Position: Sitting, Cuff Size: Adult Regular)   Pulse 84   Temp 97.5  F (36.4  C) (Tympanic)   Resp 18   Ht 1.549 m (5' 1\")   Wt 54.4 kg (120 lb)   SpO2 95%   BMI 22.67 kg/m   Estimated body mass index is 22.67 kg/m  as calculated from the following:    Height as of this encounter: 1.549 m (5' 1\").    Weight as of this encounter: 54.4 kg (120 lb).  Physical Exam  General Appearance: Pleasant, alert, appropriate appearance for age. No acute distress  Eye Exam:  Normal external eye, conjunctiva, lids, cornea. FAITH.  Ear Exam: Normal TM's bilaterally. Normal auditory canals and external ears.  OroPharynx Exam:  Dental hygiene adequate. Normal buccal mucosa. Normal pharynx.  Neck Exam:  Supple, no masses or nodes.  Thyroid Exam: No nodules or " enlargement.  Chest/Respiratory Exam: Normal chest wall and respirations. Clear to auscultation.  Cardiovascular Exam: Regular rate and rhythm. S1, S2, no murmur, click, gallop, or rubs.  Gastrointestinal Exam: Soft, non-tender, no masses or organomegaly.  Musculoskeletal Exam:Neck stiff, reduced ROM  Foot Exam: Left and right foot: good pedal pulses.  Skin: numerous erythematous rough patches on face and one on left ear consistent with AK.  Neurologic Exam: Nonfocal, symmetric DTRs, normal gross motor, tone coordination and no tremor. Strength symmetric bilateral upper and lower extremities.  Psychiatric Exam: Alert and oriented - appropriate affect.      Results for orders placed or performed in visit on 01/05/22   COVID-19 [facility] Reflex     Status: None   Result Value Ref Range    SARS-CoV-2 Nicholas Ab, Interp, S Positive     SARS-CoV-2 Nicholas Ab, Quant, S 227 U/mL    Patient's Race White     Patient's Ethnicity Not     Lipid Panel     Status: Abnormal   Result Value Ref Range    Cholesterol 217 (H) <200 mg/dL    Triglycerides 79 <150 mg/dL    Direct Measure HDL 90 23 - 92 mg/dL    LDL Cholesterol Calculated 111 (H) <=100 mg/dL    Non HDL Cholesterol 127 <130 mg/dL    Patient Fasting > 8hrs? Unknown     Narrative    Cholesterol  Desirable:  <200 mg/dL    Triglycerides  Normal:  Less than 150 mg/dL  Borderline High:  150-199 mg/dL  High:  200-499 mg/dL  Very High:  Greater than or equal to 500 mg/dL    Direct Measure HDL  Female:  Greater than or equal to 50 mg/dL   Male:  Greater than or equal to 40 mg/dL    LDL Cholesterol  Desirable:  <100mg/dL  Above Desirable:  100-129 mg/dL   Borderline High:  130-159 mg/dL   High:  160-189 mg/dL   Very High:  >= 190 mg/dL    Non HDL Cholesterol  Desirable:  130 mg/dL  Above Desirable:  130-159 mg/dL  Borderline High:  160-189 mg/dL  High:  190-219 mg/dL  Very High:  Greater than or equal to 220 mg/dL        ASSESSMENT / PLAN:       ICD-10-CM    1.  Encounter for Medicare annual wellness exam  Z00.00    2. Left cervical radiculopathy  M54.12 celecoxib (CELEBREX) 200 MG capsule     MR Cervical Spine w/o Contrast   3. Right cervical radiculopathy  M54.12 acetaminophen-codeine (TYLENOL #3) 300-30 MG tablet   4. Left lumbar radiculopathy  M54.16 MR Lumbar Spine w/o Contrast   5. History of COVID-19  Z86.16 COVID-19 [facility]     COVID-19 [facility] Reflex   6. 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)  C82.01    7. Age-related osteoporosis without current pathological fracture  M81.0    8. Actinic keratosis  L57.0 Adult General Surg Referral   9. Lipid screening  Z13.220 Lipid Panel     Lipid Panel   10. Need for prophylactic vaccination and inoculation against influenza  Z23 INFLUENZA, QUAD, HIGH DOSE, PF, 65YR + (FLUZONE HD)         Patient has been advised of split billing requirements and indicates understanding: Yes     Reporting new left cervical radiculopathy symptoms.  History of C5-6 anterior cervical fusion.  In the past she had some right radiculopathy symptoms that improved with LISA.  The left symptoms are new for months.  There was some right sided foraminal stenosis at C5-6 on MRI in 2018.  Recommend repeat MRI for assessment of symptoms.  Consider cervical LISA depending on results.    Has symptoms of left lumbar neurogenic claudication after walking for a couple miles.  This is limiting her activity.  History of prior left hemilaminectomy at L4-5.  There was some mild degenerative and postoperative related changes seen on MRI in 2017.  Ordered repeat lumbar MRI for further assessment.    Using Tylenol 3 sporadically, but 150 pills every 3 months to help with chronic neck and lumbar pain.  Reliable in use.  Provided 150 pill supply with 1 refill, typically last 6 months.  PDMP Review       Value Time User    State PDMP site checked  Yes 1/5/2022  9:38 AM Jose Duffy MD         She reports a history of  "COVID in 2020, but did not have testing.  Is concerned that COVID vaccines will lead to adenopathy from what she has read.  With her history of lymphoma, this is concerning.  Therefore she has elected against COVID vaccination to this point.  We discussed this briefly.  I offered Covid antibody testing to ensure that there is evidence of past infection.  It may return negative and she could have had infection since it was last year, but if it is positive, that could aid in her decision making.  1 mRNA vaccine has been shown to boost antibodies and offer protection against repeat infection.  Depending on results, she can discuss vaccination with Dr. Cuenca at her appointment in a month.    No labs obtained today as she has plans for labs through oncology at [facility] and then endocrinology at [facility].    Numerous actinic keratoses on face.  We discussed potential treatment with Efudex or cryotherapy.  She has seen Dr. Dill in the past for skin lesions and so referral was placed to Dr. Dill for further evaluation to determine need for biopsy versus treatment.      COUNSELING:  Reviewed preventive health counseling, as reflected in patient instructions       Regular exercise       Healthy diet/nutrition       Vision screening       Hearing screening       Immunizations    Shingrix from a pharmacy    COVID vaccine discussion above       Colon cancer screening - due 2025   No statin with CT calcium score of 0 in 2018       The 10-year ASCVD risk score (Angel JONNY Jr., et al., 2013) is: 10.9%    Values used to calculate the score:      Age: 71 years      Sex: Female      Is Non- : No      Diabetic: No      Tobacco smoker: No      Systolic Blood Pressure: 134 mmHg      Is BP treated: No      HDL Cholesterol: 90 mg/dL      Total Cholesterol: 217 mg/dL    Estimated body mass index is 22.67 kg/m  as calculated from the following:    Height as of this encounter: 1.549 m (5' 1\").    Weight as of this " encounter: 54.4 kg (120 lb).    Weight management plan noted, stable and monitoring    She reports that she has never smoked. She has never used smokeless tobacco.      Appropriate preventive services were discussed with this patient, including applicable screening as appropriate for cardiovascular disease, diabetes, osteopenia/osteoporosis, and glaucoma.  As appropriate for age/gender, discussed screening for colorectal cancer, prostate cancer, breast cancer, and cervical cancer. Checklist reviewing preventive services available has been given to the patient.    Reviewed patients plan of care and provided an AVS. The Basic Care Plan (routine screening as documented in Health Maintenance) for Corinne meets the Care Plan requirement. This Care Plan has been established and reviewed with the Patient.    Counseling Resources:  ATP IV Guidelines  Pooled Cohorts Equation Calculator  Breast Cancer Risk Calculator  Breast Cancer: Medication to Reduce Risk  FRAX Risk Assessment  ICSI Preventive Guidelines  Dietary Guidelines for Americans, 2010  USDA's MyPlate  ASA Prophylaxis  Lung CA Screening    Jose Duffy MD  [facility]    Identified Health Risks:

## 2022-01-06 LAB
SARS-COV-2 AB SERPL IA-ACNC: 227 U/ML
SARS-COV-2 AB SERPL QL IA: POSITIVE

## 2022-01-11 ENCOUNTER — OFFICE VISIT (OUTPATIENT)
Dept: SURGERY | Facility: OTHER | Age: 72
End: 2022-01-11
Attending: SURGERY
Payer: MEDICARE

## 2022-01-11 VITALS
TEMPERATURE: 98.6 F | HEART RATE: 93 BPM | OXYGEN SATURATION: 97 % | WEIGHT: 121 LBS | DIASTOLIC BLOOD PRESSURE: 70 MMHG | SYSTOLIC BLOOD PRESSURE: 128 MMHG | BODY MASS INDEX: 22.86 KG/M2 | RESPIRATION RATE: 16 BRPM

## 2022-01-11 DIAGNOSIS — L98.9 SKIN LESION OF LEFT ARM: ICD-10-CM

## 2022-01-11 DIAGNOSIS — D04.4 SQUAMOUS CELL CARCINOMA IN SITU (SCCIS) OF SKIN OF CROWN: ICD-10-CM

## 2022-01-11 DIAGNOSIS — Z85.828 HISTORY OF SCC (SQUAMOUS CELL CARCINOMA) OF SKIN: ICD-10-CM

## 2022-01-11 DIAGNOSIS — L57.0 ACTINIC KERATOSIS: ICD-10-CM

## 2022-01-11 DIAGNOSIS — L57.0 MULTIPLE ACTINIC KERATOSES: Primary | ICD-10-CM

## 2022-01-11 PROCEDURE — 17000 DESTRUCT PREMALG LESION: CPT | Mod: XS | Performed by: SURGERY

## 2022-01-11 PROCEDURE — 11602 EXC TR-EXT MAL+MARG 1.1-2 CM: CPT | Performed by: SURGERY

## 2022-01-11 PROCEDURE — 17003 DESTRUCT PREMALG LES 2-14: CPT | Performed by: SURGERY

## 2022-01-11 PROCEDURE — 11604 EXC TR-EXT MAL+MARG 3.1-4 CM: CPT | Performed by: SURGERY

## 2022-01-11 PROCEDURE — 88305 TISSUE EXAM BY PATHOLOGIST: CPT

## 2022-01-11 PROCEDURE — G0463 HOSPITAL OUTPT CLINIC VISIT: HCPCS | Mod: 25

## 2022-01-11 PROCEDURE — 11601 EXC TR-EXT MAL+MARG 0.6-1 CM: CPT | Performed by: SURGERY

## 2022-01-11 RX ORDER — FLUOROURACIL 50 MG/G
CREAM TOPICAL 2 TIMES DAILY
Qty: 40 G | Refills: 0 | Status: SHIPPED | OUTPATIENT
Start: 2022-01-11 | End: 2022-09-08

## 2022-01-11 ASSESSMENT — PAIN SCALES - GENERAL: PAINLEVEL: MILD PAIN (2)

## 2022-01-11 NOTE — NURSING NOTE
"Chief Complaint   Patient presents with     Procedure     skin lesions       Initial /70 (BP Location: Left arm, Patient Position: Sitting, Cuff Size: Adult Regular)   Pulse 93   Temp 98.6  F (37  C) (Tympanic)   Resp 16   Wt 54.9 kg (121 lb)   SpO2 97%   BMI 22.86 kg/m   Estimated body mass index is 22.86 kg/m  as calculated from the following:    Height as of 1/5/22: 1.549 m (5' 1\").    Weight as of this encounter: 54.9 kg (121 lb).  Medication Reconciliation: complete    Alesha Lo LPN    "

## 2022-01-11 NOTE — PROGRESS NOTES
SUBJECTIVE:   71 year old female presents for lesion removal from her left arm. This lesion has been present for a few months. It is not healing and getting bigger. There are also lesions on her left cheek, forehead, right cheek and scalp. Some of these have been biopsied and treated in the past. None were invasive. 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   OBJECTIVE:   /70 (BP Location: Left arm, Patient Position: Sitting, Cuff Size: Adult Regular)   Pulse 93   Temp 98.6  F (37  C) (Tympanic)   Resp 16   Wt 54.9 kg (121 lb)   SpO2 97%   BMI 22.86 kg/m    General: no acute distress  Skin left arm with nodular, rough pink lesion, 0.8 x 0.4 x 0.2 cm, left cheek 0.8 cm scaly pink lesion, left forehead 0.4 cm rough pink lesion, right cheek with 0.7 cm rough pink lesion, scalp with 0.8 cm hyperkeratotic rough lesion     ASSESSMENT:   Lesion size: see above   Defect size: left arm 1. 2 x 0.8 x 0.3 cm-simple closure     PROCEDURE:   The pathophysiology of skin lesions and skin cancer was discussed with the patient. The risks,benefits and alternatives to excision of the lesion from the left arm were discussed with the patient, including the risks of infection, scarring, bruising, bleeding and the possible need for further procedures.   We discussed cryotherapy of the lesions on the face and scalp. We specifically discussed the risks of infection, discoloration and the possible need for further treatments. The patient expressed understanding and the patient wishes to proceed. Informed consent paperwork was completed.   Procedure:   The area of the skin lesions on the face and scalp were treated with liquid nitrogen for 2 freeze thaw cycles. The patient tolerated the procedure with no immediately apparent complications.   Chloraprep was used to cleanse the skin in the area of the lesion on the left upper arm. 1% Lidocaine with epinephrine was infiltrated in the skin and subcutaneous tissue in the area of  the lesion. When appropriate anesthesia had been achieved, the lesion was sharply excised with a margin of grossly normal tissue. The skin edges were reapproximated using 4-0 monocryl. Sterile dressing was applied. The specimen was labelled and sent topathology for evaluation. The procedure was well tolerated without complications. Patient was given post procedure instructions and denied further questions. We will call the patient with pathology results.

## 2022-01-11 NOTE — PATIENT INSTRUCTIONS
Your incision was closed with stitches that will dissolve.     It is ok to remove the dressing and get the incision wet in the shower on the day after your procedure.     Don't soak in a tub, pool or lake for 5 days. The small butterfly strips will start to peel off in 5-7 days it is ok to remove them. If you have concerns, please call.   Let the sites we froze heal for 3-4 weeks. Then start the topical 5FU twice a day for 3 weeks. Stop it after the 3 weeks and let things heal. Call if you are worried or have questions!     What to Expect Following Cryosurgery (Liquid Nitrogen)   What isCryosurgery?   Cryosurgery is a technique for removing skin lesions that primarily involve the surface of the skin, such as warts, seborrheic keratosis, or actinic keratosis. It is a quick method of removing the lesion with minimal scarring.   The liquid nitrogen needs karen applied long enough to freeze the affected skin. By freezing the skin, a blister is created underneath the lesion. Ideally, as the new skin forms underneath the blister, the abnormal skin on the roof of the blister peelsoff. Occasionally, if the lesion is very thick (such as a large wart), only the surface is blistered off. The base or residual lesion may need to be frozen at another visit.   It takes about one to two weeks for the scabto fall off, which is when the new layer of skin has formed under the blister. Areas of thinner skin, such as the face, may heal a little faster.      What to Expect Over the Next Few Weeks     During Treatment - Area being treated will sting, burn, and then possibly itch.     Immediately After Treatment - Area will be red, sore, and swollen.     Next Day - Blister or blood blister has formed, tendernessstarts to subside. Apply a Band-Aid if necessary.     7 Days - Surface is dark red/brown and scab-like. You can apply an antibacterial ointment, such as Polysporin , but you don't have to.     2 to 4 Weeks - The surface starts to  peel off. This may be encouraged gently during bathing, when the scab is softened.     No makeup should be applied until area is fully healed.      How to Take Care of the Skin after Cryosurgery   A Band-Aid can be used for larger blisters or blisters in areas that are more likely to betraumatized -such as fingers and toes. If the area becomes dry or crusted, an ointment (Vaseline , Bacitracin , or Polysporin ) can also be applied.   Cleanse area with a mild cleanser and cool water.   Patthe area dry with a lint-free cloth.   Avoid glycolic acids, Vitamin C, scrubs, Tretinoin (Retin-A), and Retinol creams for 7 to 10 days.  The area may get wet while bathing, but swimming or hot tub use should beavoided for one week following a treatment or while the skin is open.   Within 24 hours, you can expect the area to be swollen and/or blistered. The blister may not be visible to the naked eye.   Within one week, theswelling goes down. The top becomes dark red and scab-like. The scab will loosen over the next weeks, and should fall off within one month.      Adverse Effects   The most common adverse effects are pain,swelling/blistering, potential for infection, and discoloration of the skin after it heals.     Blisters  Anytime a blister surfaces, whether from ill-fitting shoes, an oven burn, or liquid nitrogencryosurgery, it will be a bit painful. For most patients, the pain is a temporary sting with some discomfort periodically over the next day as the blister forms.   The goal is to achieve a blister. This means, mostcommonly, patients will have a blister form following treatment. Sometimes, the blister is so thin that it can't be seen and may have minimal swelling. Occasionally, a blood blister forms that can be quite dramatic but isharmless.   Rarely, the blister may become infected. When this happens, the blister becomes unusually tender, the fluid becomes cloudy, and the redness around it becomes more extensive (and  may even form streaks). If this happens, contact our office.   Some lesions, especially those on the face, may leave a slight palediscoloration.   True scarring, involving deeper layers of the skin is unlikely.

## 2022-01-11 NOTE — NURSING NOTE
Prior to the start of the procedure and with procedural staff participation, I verbally confirmed the patient s identity using two indicators, relevant allergies, that the procedure was appropriate and matched the consent or emergent situation, and that the correct equipment/implants were available. Immediately prior to starting the procedure I conducted the Time Out with the procedural staff and re-confirmed the patient s name, procedure, and site/side. (The Joint Commission universal protocol was followed.)  Yes    Sedation (Moderate or Deep): None  Alesha Lo LPN .......1/11/2022 9:53 AM

## 2022-01-12 ENCOUNTER — HOSPITAL ENCOUNTER (OUTPATIENT)
Dept: MRI IMAGING | Facility: OTHER | Age: 72
End: 2022-01-12
Attending: FAMILY MEDICINE
Payer: MEDICARE

## 2022-01-12 DIAGNOSIS — M54.16 LEFT LUMBAR RADICULOPATHY: ICD-10-CM

## 2022-01-12 DIAGNOSIS — M54.12 LEFT CERVICAL RADICULOPATHY: ICD-10-CM

## 2022-01-12 PROCEDURE — 72141 MRI NECK SPINE W/O DYE: CPT

## 2022-01-12 PROCEDURE — 72148 MRI LUMBAR SPINE W/O DYE: CPT

## 2022-01-13 LAB
PATH REPORT.COMMENTS IMP SPEC: NORMAL
PATH REPORT.FINAL DX SPEC: NORMAL
PHOTO IMAGE: NORMAL

## 2022-03-01 ENCOUNTER — OFFICE VISIT (OUTPATIENT)
Dept: SURGERY | Facility: OTHER | Age: 72
End: 2022-03-01
Attending: SURGERY
Payer: MEDICARE

## 2022-03-01 VITALS
SYSTOLIC BLOOD PRESSURE: 120 MMHG | WEIGHT: 121 LBS | HEART RATE: 73 BPM | TEMPERATURE: 96.5 F | BODY MASS INDEX: 22.86 KG/M2 | DIASTOLIC BLOOD PRESSURE: 80 MMHG | RESPIRATION RATE: 16 BRPM | OXYGEN SATURATION: 97 %

## 2022-03-01 DIAGNOSIS — L57.0 ACTINIC KERATOSIS: ICD-10-CM

## 2022-03-01 DIAGNOSIS — Z85.828 HISTORY OF BASAL CELL CARCINOMA (BCC) OF SKIN: Primary | ICD-10-CM

## 2022-03-01 DIAGNOSIS — D04.4 SQUAMOUS CELL CARCINOMA IN SITU (SCCIS) OF SKIN OF CROWN: ICD-10-CM

## 2022-03-01 PROCEDURE — G0463 HOSPITAL OUTPT CLINIC VISIT: HCPCS

## 2022-03-01 PROCEDURE — 17000 DESTRUCT PREMALG LESION: CPT | Performed by: SURGERY

## 2022-03-01 RX ORDER — ZOLEDRONIC ACID 5 MG/100ML
5 INJECTION, SOLUTION INTRAVENOUS ONCE
Status: CANCELLED
Start: 2022-03-01 | End: 2022-03-01

## 2022-03-01 ASSESSMENT — PAIN SCALES - GENERAL: PAINLEVEL: MILD PAIN (2)

## 2022-03-01 NOTE — PROGRESS NOTES
Primary Care Physician: Jose Duffy MD      HPI:   Patient is here for skin check. The patient is 72 year old female.  She as a history of basal cell skin cancer on her left arm and SCCIS on her scalp. Has a history of extensive sun exposure. Has been using sunscreen and avoiding sun more recently. Concerning lesions today: right upper arm with new rough scaly patch. Left arm is healed. Scalp lesion peeled off. Facial lesions are healing after using the efudex. No problems with that.     ASSESSMENT AND PLAN/RECOMMENDATIONS:   Actinic keratosis right upper arm-see below  Basal cell left upper arm-healing-recheck in 3 months.   Facial actinic keratoses-let heal-will recheck in 3 months.  Scalp SCCIS-healing, will recheck in 3 months.  Call for concerning lesions before 3 month check. Discussed long sleeves with SPF 30 or more and use of a brimmed hat to protect scalp, neck and face. Patient will call with concerns or questions.      Procedure:   Plan:  We discussed cryo therapy of the lesion on the right upper arm. We specifically discussed the risks of infection, discoloration and the possible need for further treatments. The patient expressed understanding and the patient wishes to proceed.  Procedure:  The area of the skin lesion on the right upper arm was treated with liquid nitrogen for 2 freeze thaw cycles. The patient tolerated the procedure with no immediately apparent complications. We reviewed discharge instructions. The patient will call for any concerns. The patient will follow up 3 for a recheck of the area. The patient denies questions at this time.        Past Medical History:   Diagnosis Date     Actinic keratosis     2/22/2013     Age-related osteoporosis without current pathological fracture     on Fosamax for 17 years--stopped 2011     Asymptomatic menopausal state     age 50     Closed displaced fracture of lateral malleolus of fibula     5/17/2010     Disorder of cartilage     No Comments  Provided     Displaced fracture of lateral malleolus of unspecified fibula, initial encounter for open fracture type I or II     No Comments Provided     Dizziness and giddiness     No Comments Provided     Fracture of upper end of left humerus with routine healing     2018     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) 8/15/2016     Other cervical disc degeneration, unspecified cervical region     No Comments Provided     Other specified types of non-hodgkin lymphoma, extranodal and solid organ sites (H)          Personal history of other malignant neoplasm of skin (CODE)     3/28/2017     Rheumatic fever without heart involvement     10+ years ago     Tension-type headache, not intractable     No Comments Provided      Past Surgical History:   Procedure Laterality Date     BIOPSY BREAST      mid ,benign     COLONOSCOPY      age 55,normal 2005     COLONOSCOPY  12/10/2015    12/10/2015,lnormal--repeat 10 years, 12/10/2025     FUSION CERVICAL ANTERIOR ONE LEVEL  2012    C5-6 on left with discectomy     LAMINECTOMY LUMBAR ONE LEVEL      ,L4-5     LAPAROSCOPIC TUBAL LIGATION      FOR ECTOPIC PREGNANCY     OTHER SURGICAL HISTORY      ,VVH795,SKIN BIOPSY,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     OTHER SURGICAL HISTORY      ,56445.0,[place] ECTOP PREG BY LAPAROSCOPE,Left,left tube and ovary removed     TONSILLECTOMY           Family History   Problem Relation Age of Onset     Heart Disease Child      Heart Disease Mother         Heart Disease, after cardiac bypass, age 71     Heart Disease Father         Heart Disease,congestive heart failure     Breast Cancer Sister      Hypertension Brother      Diabetes Brother      Breast Cancer Niece      Hypertension Sister      Hypertension Sister      Hypertension Sister      Emphysema Sister      No Known Problems Brother      Heart Disease Brother      Colon Cancer Paternal Half-Brother      Social History      Socioeconomic History     Marital status:      Spouse name: None     Number of children: None     Years of education: None     Highest education level: None   Occupational History     None   Tobacco Use     Smoking status: Never Smoker     Smokeless tobacco: Never Used   Vaping Use     Vaping Use: Never used   Substance and Sexual Activity     Alcohol use: Not Currently     Alcohol/week: 1.0 standard drink     Comment:  once weekly she will have a glass of beer or wine at holiday gatherings     Drug use: No     Sexual activity: Yes     Partners: Male   Other Topics Concern     Parent/sibling w/ CABG, MI or angioplasty before 65F 55M? 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     Social Determinants of Health     Financial Resource Strain: Not on file   Food Insecurity: Not on file   Transportation Needs: Not on file   Physical Activity: Not on file   Stress: Not on file   Social Connections: Not on file   Intimate Partner Violence: Not on file   Housing Stability: Not on file     Current Outpatient Medications   Medication     acetaminophen-codeine (TYLENOL #3) 300-30 MG tablet     calcium carbonate (CALCIUM CARBONATE) 600 MG tablet     celecoxib (CELEBREX) 200 MG capsule     cholecalciferol (D 2000) 2000 UNITS tablet     fluorouracil (EFUDEX) 5 % external cream     No current facility-administered medications for this visit.     No Known Allergies  REVIEW OF SYSTEMS:  GENERAL: No fevers or chills. Denies fatigue, recent weight loss.  HEENT: No sinus drainage. No changes with vision or hearing. No difficulty swallowing.   LYMPHATICS:  No swollen nodes in axilla, neck or groin.  CARDIOVASCULAR: Denies chest pain, palpitations and dyspnea on exertion.  PULMONARY: No shortness of breath or cough. No increase in sputum production.  GI: Denies melena, bright red blood in stools. No hematemesis. No constipation or diarrhea.  :  No dysuria or hematuria.  SKIN: No recent rashes or ulcers. As above   HEMATOLOGY:  No history of easy bruising or bleeding.  ENDOCRINE:  No history of diabetes or thyroid problems.  NEUROLOGY:  No history of seizures or headaches. No motor or sensory changes.    PHYSICAL EXAM  Vitals: /80 (BP Location: Right arm, Patient Position: Sitting, Cuff Size: Adult Regular)   Pulse 73   Temp (!) 96.5  F (35.8  C) (Temporal)   Resp 16   Wt 54.9 kg (121 lb)   SpO2 97%   BMI 22.86 kg/m    GENERAL: Healthy appearing patient in no acute distress. Pleasant and cooperative with exam and interview.   HEENT:Head-normocephalic. Eyes-no scleral icterus. Nose-no nasal drainage. No lesions. Mouth-oral mucosa pink and moist, no lesions.  NECK: Supple. No thyroid nodules. Trachea midline.  LYMPHATICS:  No cervical, axillary or supraclavicular adenopathy.  SKIN: Pink, warm and dry. No jaundice. No rash. Well healed left upper arm incision without nodularity or ulceration, new 3 mm rough scaly lesion right upper arm. Healing lesions on face-forehead, cheeks. Healed site on scalp after last cryotherapy.  NEURO:  Cranial nerves II-XII grossly intact. Alert and oriented.  PSYCH: Appropriate mood and affect.    IMAGING/LAB  I personally reviewed patient's pathology reports.

## 2022-03-01 NOTE — PATIENT INSTRUCTIONS
Call for any concerns!   What to Expect Following Cryosurgery (Liquid Nitrogen)   What isCryosurgery?   Cryosurgery is a technique for removing skin lesions that primarily involve the surface of the skin, such as warts, seborrheic keratosis, or actinic keratosis. It is a quick method of removing the lesion with minimal scarring.   The liquid nitrogen needs karen applied long enough to freeze the affected skin. By freezing the skin, a blister is created underneath the lesion. Ideally, as the new skin forms underneath the blister, the abnormal skin on the roof of the blister peelsoff. Occasionally, if the lesion is very thick (such as a large wart), only the surface is blistered off. The base or residual lesion may need to be frozen at another visit.   It takes about one to two weeks for the scabto fall off, which is when the new layer of skin has formed under the blister. Areas of thinner skin, such as the face, may heal a little faster.      What to Expect Over the Next Few Weeks     During Treatment - Area being treated will sting, burn, and then possibly itch.     Immediately After Treatment - Area will be red, sore, and swollen.     Next Day - Blister or blood blister has formed, tendernessstarts to subside. Apply a Band-Aid if necessary.     7 Days - Surface is dark red/brown and scab-like. You can apply an antibacterial ointment, such as Polysporin , but you don't have to.     2 to 4 Weeks - The surface starts to peel off. This may be encouraged gently during bathing, when the scab is softened.     No makeup should be applied until area is fully healed.      How to Take Care of the Skin after Cryosurgery   A Band-Aid can be used for larger blisters or blisters in areas that are more likely to betraumatized -such as fingers and toes. If the area becomes dry or crusted, an ointment (Vaseline , Bacitracin , or Polysporin ) can also be applied.   Cleanse area with a mild cleanser and cool water.   Patthe area dry  with a lint-free cloth.   Avoid glycolic acids, Vitamin C, scrubs, Tretinoin (Retin-A), and Retinol creams for 7 to 10 days.  The area may get wet while bathing, but swimming or hot tub use should beavoided for one week following a treatment or while the skin is open.   Within 24 hours, you can expect the area to be swollen and/or blistered. The blister may not be visible to the naked eye.   Within one week, theswelling goes down. The top becomes dark red and scab-like. The scab will loosen over the next weeks, and should fall off within one month.      Adverse Effects   The most common adverse effects are pain,swelling/blistering, potential for infection, and discoloration of the skin after it heals.     Blisters  Anytime a blister surfaces, whether from ill-fitting shoes, an oven burn, or liquid nitrogencryosurgery, it will be a bit painful. For most patients, the pain is a temporary sting with some discomfort periodically over the next day as the blister forms.   The goal is to achieve a blister. This means, mostcommonly, patients will have a blister form following treatment. Sometimes, the blister is so thin that it can't be seen and may have minimal swelling. Occasionally, a blood blister forms that can be quite dramatic but isharmless.   Rarely, the blister may become infected. When this happens, the blister becomes unusually tender, the fluid becomes cloudy, and the redness around it becomes more extensive (and may even form streaks). If this happens, contact our office.   Some lesions, especially those on the face, may leave a slight palediscoloration.   True scarring, involving deeper layers of the skin is unlikely.

## 2022-03-01 NOTE — NURSING NOTE
"Chief Complaint   Patient presents with     Procedure     recheck skin lesions       Initial There were no vitals taken for this visit. Estimated body mass index is 22.86 kg/m  as calculated from the following:    Height as of 1/5/22: 1.549 m (5' 1\").    Weight as of 1/11/22: 54.9 kg (121 lb).  Medication Reconciliation: complete    Alesha Lo LPN    "

## 2022-03-15 NOTE — PATIENT INSTRUCTIONS
Shingles vaccine at a pharmacy    MRI of cervical and lumbar regions  If the arm or leg worsens, consider epidural steroid injection          Patient Education   Personalized Prevention Plan  You are due for the preventive services outlined below.  Your care team is available to assist you in scheduling these services.  If you have already completed any of these items, please share that information with your care team to update in your medical record.  Health Maintenance Due   Topic Date Due     COVID-19 Vaccine (1) Never done     FALL RISK ASSESSMENT  01/19/2022        
Speaking Coherently

## 2022-04-12 ENCOUNTER — MYC MEDICAL ADVICE (OUTPATIENT)
Dept: FAMILY MEDICINE | Facility: OTHER | Age: 72
End: 2022-04-12
Payer: COMMERCIAL

## 2022-04-12 DIAGNOSIS — M54.12 LEFT CERVICAL RADICULOPATHY: Primary | ICD-10-CM

## 2022-05-04 ENCOUNTER — HOSPITAL ENCOUNTER (OUTPATIENT)
Dept: GENERAL RADIOLOGY | Facility: OTHER | Age: 72
Discharge: HOME | End: 2022-05-04
Attending: FAMILY MEDICINE | Admitting: FAMILY MEDICINE
Payer: MEDICARE

## 2022-05-04 DIAGNOSIS — M54.12 LEFT CERVICAL RADICULOPATHY: ICD-10-CM

## 2022-05-04 PROCEDURE — 250N000009 HC RX 250: Performed by: RADIOLOGY

## 2022-05-04 PROCEDURE — 62321 NJX INTERLAMINAR CRV/THRC: CPT

## 2022-05-04 PROCEDURE — 255N000002 HC RX 255 OP 636: Performed by: RADIOLOGY

## 2022-05-04 PROCEDURE — 250N000011 HC RX IP 250 OP 636: Performed by: RADIOLOGY

## 2022-05-04 RX ORDER — LIDOCAINE HYDROCHLORIDE 10 MG/ML
1 INJECTION, SOLUTION INFILTRATION; PERINEURAL ONCE
Status: COMPLETED | OUTPATIENT
Start: 2022-05-04 | End: 2022-05-04

## 2022-05-04 RX ORDER — BETAMETHASONE SODIUM PHOSPHATE AND BETAMETHASONE ACETATE 3; 3 MG/ML; MG/ML
2 INJECTION, SUSPENSION INTRA-ARTICULAR; INTRALESIONAL; INTRAMUSCULAR; SOFT TISSUE ONCE
Status: COMPLETED | OUTPATIENT
Start: 2022-05-04 | End: 2022-05-04

## 2022-05-04 RX ADMIN — BETAMETHASONE SODIUM PHOSPHATE AND BETAMETHASONE ACETATE 2 ML: 3; 3 INJECTION, SUSPENSION INTRA-ARTICULAR; INTRALESIONAL; INTRAMUSCULAR at 12:25

## 2022-05-04 RX ADMIN — LIDOCAINE HYDROCHLORIDE 1 ML: 10 INJECTION, SOLUTION INFILTRATION; PERINEURAL at 12:25

## 2022-05-04 RX ADMIN — IOHEXOL 1 ML: 240 INJECTION, SOLUTION INTRATHECAL; INTRAVASCULAR; INTRAVENOUS; ORAL at 12:24

## 2022-06-02 ENCOUNTER — OFFICE VISIT (OUTPATIENT)
Dept: SURGERY | Facility: OTHER | Age: 72
End: 2022-06-02
Attending: SURGERY
Payer: MEDICARE

## 2022-06-02 VITALS
BODY MASS INDEX: 22.86 KG/M2 | RESPIRATION RATE: 16 BRPM | OXYGEN SATURATION: 99 % | DIASTOLIC BLOOD PRESSURE: 64 MMHG | HEART RATE: 73 BPM | TEMPERATURE: 97.8 F | SYSTOLIC BLOOD PRESSURE: 120 MMHG | WEIGHT: 121 LBS

## 2022-06-02 DIAGNOSIS — L57.0 ACTINIC KERATOSES: Primary | ICD-10-CM

## 2022-06-02 DIAGNOSIS — R49.9 CHANGE IN VOICE: ICD-10-CM

## 2022-06-02 DIAGNOSIS — Z85.828 HISTORY OF BASAL CELL CARCINOMA OF SKIN: ICD-10-CM

## 2022-06-02 PROCEDURE — 99212 OFFICE O/P EST SF 10 MIN: CPT | Mod: 25 | Performed by: SURGERY

## 2022-06-02 PROCEDURE — 17003 DESTRUCT PREMALG LES 2-14: CPT | Performed by: SURGERY

## 2022-06-02 PROCEDURE — 17000 DESTRUCT PREMALG LESION: CPT | Performed by: SURGERY

## 2022-06-02 PROCEDURE — G0463 HOSPITAL OUTPT CLINIC VISIT: HCPCS

## 2022-06-02 PROCEDURE — G0463 HOSPITAL OUTPT CLINIC VISIT: HCPCS | Mod: 25

## 2022-06-02 ASSESSMENT — PAIN SCALES - GENERAL: PAINLEVEL: NO PAIN (0)

## 2022-06-02 NOTE — PATIENT INSTRUCTIONS
You will get a call to schedule with Dr. Solis. Call for questions or concerns.  What to Expect Following Cryosurgery (Liquid Nitrogen)   What isCryosurgery?   Cryosurgery is a technique for removing skin lesions that primarily involve the surface of the skin, such as warts, seborrheic keratosis, or actinic keratosis. It is a quick method of removing the lesion with minimal scarring.   The liquid nitrogen needs karen applied long enough to freeze the affected skin. By freezing the skin, a blister is created underneath the lesion. Ideally, as the new skin forms underneath the blister, the abnormal skin on the roof of the blister peelsoff. Occasionally, if the lesion is very thick (such as a large wart), only the surface is blistered off. The base or residual lesion may need to be frozen at another visit.   It takes about one to two weeks for the scabto fall off, which is when the new layer of skin has formed under the blister. Areas of thinner skin, such as the face, may heal a little faster.      What to Expect Over the Next Few Weeks   ? During Treatment - Area being treated will sting, burn, and then possibly itch.   ? Immediately After Treatment - Area will be red, sore, and swollen.   ? Next Day - Blister or blood blister has formed, tendernessstarts to subside. Apply a Band-Aid if necessary.   ? 7 Days - Surface is dark red/brown and scab-like. You can apply an antibacterial ointment, such as Polysporin , but you don't have to.   ? 2 to 4 Weeks - The surface starts to peel off. This may be encouraged gently during bathing, when the scab is softened.   ? No makeup should be applied until area is fully healed.      How to Take Care of the Skin after Cryosurgery   A Band-Aid can be used for larger blisters or blisters in areas that are more likely to betraumatized -such as fingers and toes. If the area becomes dry or crusted, an ointment (Vaseline , Bacitracin , or Polysporin ) can also be applied.   Cleanse area  with a mild cleanser and cool water.   Patthe area dry with a lint-free cloth.   Avoid glycolic acids, Vitamin C, scrubs, Tretinoin (Retin-A), and Retinol creams for 7 to 10 days.  The area may get wet while bathing, but swimming or hot tub use should beavoided for one week following a treatment or while the skin is open.   Within 24 hours, you can expect the area to be swollen and/or blistered. The blister may not be visible to the naked eye.   Within one week, theswelling goes down. The top becomes dark red and scab-like. The scab will loosen over the next weeks, and should fall off within one month.      Adverse Effects   The most common adverse effects are pain,swelling/blistering, potential for infection, and discoloration of the skin after it heals.     Blisters  Anytime a blister surfaces, whether from ill-fitting shoes, an oven burn, or liquid nitrogencryosurgery, it will be a bit painful. For most patients, the pain is a temporary sting with some discomfort periodically over the next day as the blister forms.   The goal is to achieve a blister. This means, mostcommonly, patients will have a blister form following treatment. Sometimes, the blister is so thin that it can't be seen and may have minimal swelling. Occasionally, a blood blister forms that can be quite dramatic but isharmless.   Rarely, the blister may become infected. When this happens, the blister becomes unusually tender, the fluid becomes cloudy, and the redness around it becomes more extensive (and may even form streaks). If this happens, contact our office.   Some lesions, especially those on the face, may leave a slight palediscoloration.   True scarring, involving deeper layers of the skin is unlikely.

## 2022-06-02 NOTE — PROGRESS NOTES
Primary Care Physician: Jose Duffy MD      HPI:   Patient is here for skin check. The patient is 72 year old female with history of lymphoma and AK.  Has a history of basal skin cancer on her left arm. Has a history of extensive sun exposure. Has been using sunscreen and avoiding sun more recently. Concerning lesions today: 3 rough scaly lesions on her right arm, one on her left arm, 3 on her chest wall and one on her upper lip.  She has concerns about voice weakness and changes over the last few months. She has a history of lymphoma of the neck nodes and had surgery and radiation in the past.     ASSESSMENT AND PLAN/RECOMMENDATIONS:   Voice changes: ENT referral-patient has seen Dr. Solis in the past. Referral ordered.      Procedure:   Plan:  We discussed cryo therapy of the lesions. We specifically discussed the risks of infection, discoloration and the possible need for further treatments. The patient expressed understanding and the patient wishes to proceed.  Procedure:  The area of the skin lesions on her right arm, left arm, chest and upper lip were treated with liquid nitrogen for 2 freeze thaw cycles. The patient tolerated the procedure with no immediately apparent complications. We reviewed discharge instructions. The patient will call for any concerns. The patient will follow up in about 3 months. The patient denies questions at this time.        Past Medical History:   Diagnosis Date     Actinic keratosis     2/22/2013     Age-related osteoporosis without current pathological fracture     on Fosamax for 17 years--stopped 2011     Asymptomatic menopausal state     age 50     Closed displaced fracture of lateral malleolus of fibula     5/17/2010     Disorder of cartilage     No Comments Provided     Displaced fracture of lateral malleolus of unspecified fibula, initial encounter for open fracture type I or II     No Comments Provided     Dizziness and giddiness     No Comments Provided      Fracture of upper end of left humerus with routine healing     2018     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) 8/15/2016     Other cervical disc degeneration, unspecified cervical region     No Comments Provided     Other specified types of non-hodgkin lymphoma, extranodal and solid organ sites (H)          Personal history of other malignant neoplasm of skin (CODE)     3/28/2017     Rheumatic fever without heart involvement     10+ years ago     Tension-type headache, not intractable     No Comments Provided      Past Surgical History:   Procedure Laterality Date     BIOPSY BREAST      mid ,benign     COLONOSCOPY      age 55,normal 2005     COLONOSCOPY  12/10/2015    12/10/2015,lnormal--repeat 10 years, 12/10/2025     FUSION CERVICAL ANTERIOR ONE LEVEL  2012    C5-6 on left with discectomy     LAMINECTOMY LUMBAR ONE LEVEL      ,L4-5     LAPAROSCOPIC TUBAL LIGATION      FOR ECTOPIC PREGNANCY     OTHER SURGICAL HISTORY      ,BDL657,SKIN BIOPSY,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     OTHER SURGICAL HISTORY      1974,64957.0,[place] ECTOP PREG BY LAPAROSCOPE,Left,left tube and ovary removed     TONSILLECTOMY           Family History   Problem Relation Age of Onset     Heart Disease Child      Heart Disease Mother         Heart Disease, after cardiac bypass, age 71     Heart Disease Father         Heart Disease,congestive heart failure     Breast Cancer Sister      Hypertension Brother      Diabetes Brother      Breast Cancer Niece      Hypertension Sister      Hypertension Sister      Hypertension Sister      Emphysema Sister      No Known Problems Brother      Heart Disease Brother      Colon Cancer Paternal Half-Brother      Social History     Socioeconomic History     Marital status:      Spouse name: None     Number of children: None     Years of education: None     Highest education level: None   Tobacco Use     Smoking status: Never Smoker      Smokeless tobacco: Never Used   Vaping Use     Vaping Use: Never used   Substance and Sexual Activity     Alcohol use: Not Currently     Alcohol/week: 1.0 standard drink     Comment:  once weekly she will have a glass of beer or wine at holiday gatherings     Drug use: No     Sexual activity: Yes     Partners: Male   Social History Narrative     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     No Known Allergies  REVIEW OF SYSTEMS  GENERAL: No fevers or chills. Denies fatigue, recent weight loss.  HEENT: No sinus drainage. No changes with vision or hearing. No difficulty swallowing.   LYMPHATICS:  No swollen nodes in axilla, neck or groin.  CARDIOVASCULAR: Denies chest pain, palpitations and dyspnea on exertion.  PULMONARY: No shortness of breath or cough. No increase in sputum production.  GI: Denies melena, bright red blood in stools. No hematemesis. No constipation or diarrhea.  : No dysuria or hematuria.  SKIN: No recent rashes or ulcers. Lesions as above.  HEMATOLOGY:  No history of easy bruising or bleeding.  ENDOCRINE:  No history of diabetes or thyroid problems.  NEUROLOGY:  No history of seizures or headaches. No motor or sensory changes.    PHYSICAL EXAML  Vitals: /64 (BP Location: Right arm, Patient Position: Sitting, Cuff Size: Adult Regular)   Pulse 73   Temp 97.8  F (36.6  C) (Tympanic)   Resp 16   Wt 54.9 kg (121 lb)   SpO2 99%   BMI 22.86 kg/m    GENERAL: Healthy appearing patient in no acute distress. Pleasant and cooperative with exam and interview.   HEENT:Head-normocephalic. Eyes-no scleral icterus. Nose-no  nasal drainage. No lesions. Mouth-oral mucosa pink and moist, no lesions.  NECK: Supple. No thyroid nodules. Trachea midline.   LYMPHATICS:  No cervical, axillary or supraclavicular adenopathy.  SKIN: Pink, warm and dry. No jaundice. No rash. Well healed site of previous excision left arm. Right arm with 3 rough pink scaly areas, left forearm with 1 rough pink lesion, upper anterior chest wall with 3 rough pink areas, upper lip right side with 1 rough pink scaly area. Scalp much improved at site of previous cryotherapy.   NEURO:  Cranial nerves II-XII grossly intact. Alert and oriented.  PSYCH: Appropriate mood and affect.    IMAGING/LAB  I personally reviewed patient's pathology reports.

## 2022-06-13 DIAGNOSIS — M54.12 RIGHT CERVICAL RADICULOPATHY: ICD-10-CM

## 2022-06-14 NOTE — TELEPHONE ENCOUNTER
[facility] sent Rx request for the following:   acetaminophen-codeine (TYLENOL #3) 300-30 MG tablet  Sig TAKE 1 TO 2 TABLETS BY MOUTH EVERY 6 HOURS AS NEEDED FOR SEVERE PAIN    Last Prescription Date:   01/05/2022  Last Fill Qty/Refills:         150, R-0    Last Office Visit:              01/05/2022 ([place])   Future Office visit:           None noted    Routing refill request to provider for review/approval because:  Drug not on the [facility], [facility] or [facility] refill protocol or controlled substance    Unable to complete prescription refill per RN Medication Refill Policy.................... Marcia Zamora RN ....................  6/14/2022   2:41 PM

## 2022-06-21 ENCOUNTER — OFFICE VISIT (OUTPATIENT)
Dept: OTOLARYNGOLOGY | Facility: OTHER | Age: 72
End: 2022-06-21
Attending: OTOLARYNGOLOGY
Payer: MEDICARE

## 2022-06-21 DIAGNOSIS — J38.3 VOCAL CORD BOWING: Primary | ICD-10-CM

## 2022-06-21 PROCEDURE — G0463 HOSPITAL OUTPT CLINIC VISIT: HCPCS

## 2022-06-21 NOTE — NURSING NOTE
Patient presents to the clinic today for a change in her voice.     Yamila Medina LPN.................. 6/21/2022 12:04 PM

## 2022-06-30 NOTE — PROGRESS NOTES
document embedded image  Patient Name: Hultman,Corinne G    Address: [address]     YOB: 1950    [place] [address]    MR Number: [id number]    Phone: [phone]  PCP: Jose Duffy MD            Appointment Date: 06/21/22   Visit Provider: Tad Solis MD    cc: Jose Duffy MD; ~    ENT Progress Note  Intake  Visit Reasons: Change in Voice    HPI  History of Present Illness  Chief complaint:  Intermittent voice issues    History  The patient is a 72-year-old female on whom I had previously diagnosed lymphoma by surgical excision of a right cervical lymph node.  This was 10 years ago.  She did have radiation therapy to her neck in addition to her other therapy.  She presents to the office today with some concerns regarding her voice quality.  She is finding that she can not project as well as she used to.  She finds herself easily becoming hoarse.  She denies any swallowing difficulties or other symptoms.     Exam  Her neck is without palpable mass or adenopathy  Nasal-no obstruction or purulence  Oral cavity oropharynx free of lesion or inflammation  Indirect laryngoscopy-she has normal vocal cord movement.  There is some muscular atrophy of the vocal cords with some mild gapping consistent with her age.  There is no evidence of neoplasm or neurologic deficit.   Head and neck integument-Clear  General the patient appears well and in no distress  Neuro-there are no focal cranial nerve deficits    A&P  Assessment & Plan  (1) Vocal cord bowing:        Status: Acute        Code(s):  J38.3 - Other diseases of vocal cords    Plan  The patient was reassured that her findings are consistent with a normal physiologic changes that occur as we age.  She was counseled regarding some voice preservation technique.  She will follow up with me as needed.  She was reassured there is no evidence of recurrent lymphoma or more threatening illness.      Tad Solis MD    06/21/22  0941    <Electronically signed by Tad Solis MD> 06/22/22 [id number]

## 2022-09-04 ENCOUNTER — HEALTH MAINTENANCE LETTER (OUTPATIENT)
Age: 72
End: 2022-09-04

## 2022-09-08 ENCOUNTER — OFFICE VISIT (OUTPATIENT)
Dept: FAMILY MEDICINE | Facility: OTHER | Age: 72
End: 2022-09-08
Attending: NURSE PRACTITIONER
Payer: COMMERCIAL

## 2022-09-08 VITALS
SYSTOLIC BLOOD PRESSURE: 128 MMHG | BODY MASS INDEX: 22.66 KG/M2 | WEIGHT: 120 LBS | HEIGHT: 61 IN | OXYGEN SATURATION: 98 % | RESPIRATION RATE: 16 BRPM | DIASTOLIC BLOOD PRESSURE: 80 MMHG | HEART RATE: 76 BPM | TEMPERATURE: 98.3 F

## 2022-09-08 DIAGNOSIS — Z01.818 PREOP GENERAL PHYSICAL EXAM: Primary | ICD-10-CM

## 2022-09-08 DIAGNOSIS — M54.12 RIGHT CERVICAL RADICULOPATHY: ICD-10-CM

## 2022-09-08 PROCEDURE — 99214 OFFICE O/P EST MOD 30 MIN: CPT | Performed by: NURSE PRACTITIONER

## 2022-09-08 PROCEDURE — G0463 HOSPITAL OUTPT CLINIC VISIT: HCPCS

## 2022-09-08 ASSESSMENT — PAIN SCALES - GENERAL: PAINLEVEL: NO PAIN (0)

## 2022-09-08 NOTE — NURSING NOTE
Patient presents today for pre op exam.    Medication Reconciliation Complete    Aydee Mcgregor LPN  9/8/2022 12:47 PM

## 2022-09-08 NOTE — PATIENT INSTRUCTIONS
Preparing for Your Surgery  Getting started  A nurse will call you to review your health history and instructions. They will give you an arrival time based on your scheduled surgery time. Please be ready to share:    Your doctor's clinic name and phone number    Your medical, surgical and anesthesia history    A list of allergies and sensitivities    A list of medicines, including herbal treatments and over-the-counter drugs    Whether the patient has a legal guardian (ask how to send us the papers in advance)  Please tell us if you're pregnant--or if there's any chance you might be pregnant. Some surgeries may injure a fetus (unborn baby), so they require a pregnancy test. Surgeries that are safe for a fetus don't always need a test, and you can choose whether to have one.   If you have a child who's having surgery, please ask for a copy of Preparing for Your Child's Surgery.    Preparing for surgery    Within 30 days of surgery: Have a pre-op exam (sometimes called an H&P, or History and Physical). This can be done at a clinic or pre-operative center.  ? If you're having a , you may not need this exam. Talk to your care team.    At your pre-op exam, talk to your care team about all medicines you take. If you need to stop any medicines before surgery, ask when to start taking them again.  ? We do this for your safety. Many medicines can make you bleed too much during surgery. Some change how well surgery (anesthesia) drugs work.    Call your insurance company to let them know you're having surgery. (If you don't have insurance, call [phone].)    Call your clinic if there's any change in your health. This includes signs of a cold or flu (sore throat, runny nose, cough, rash, fever). It also includes a scrape or scratch near the surgery site.    If you have questions on the day of surgery, call your hospital or surgery center.  COVID testing  You may need to be tested for COVID-19 before having  surgery. If so, we will give you instructions.  Eating and drinking guidelines  For your safety: Unless your surgeon tells you otherwise, follow the guidelines below.    Eat and drink as usual until 8 hours before surgery. After that, no food or milk.    Drink clear liquids until 2 hours before surgery. These are liquids you can see through, like water, Gatorade and Propel Water. You may also have black coffee and tea (no cream or milk).    Nothing by mouth within 2 hours of surgery. This includes gum, candy and breath mints.    If you drink alcohol: Stop drinking it the night before surgery.    If your care team tells you to take medicine on the morning of surgery, it's okay to take it with a sip of water.  Preventing infection    Shower or bathe the night before and morning of your surgery. Follow the instructions your clinic gave you. (If no instructions, use regular soap.)    Don't shave or clip hair near your surgery site. We'll remove the hair if needed.    Don't smoke or vape the morning of surgery. You may chew nicotine gum up to 2 hours before surgery. A nicotine patch is okay.  ? Note: Some surgeries require you to completely quit smoking and nicotine. Check with your surgeon.    Your care team will make every effort to keep you safe from infection. We will:  ? Clean our hands often with soap and water (or an alcohol-based hand rub).  ? Clean the skin at your surgery site with a special soap that kills germs.  ? Give you a special gown to keep you warm. (Cold raises the risk of infection.)  ? Wear special hair covers, masks, gowns and gloves during surgery.  ? Give antibiotic medicine, if prescribed. Not all surgeries need antibiotics.  What to bring on the day of surgery    Photo ID and insurance card    Copy of your health care directive, if you have one    Glasses and hearing aides (bring cases)  ? You can't wear contacts during surgery    Inhaler and eye drops, if you use them (tell us about these when  you arrive)    CPAP machine or breathing device, if you use them    A few personal items, if spending the night    If you have . . .  ? A pacemaker, ICD (cardiac defibrillator) or other implant: Bring the ID card.  ? An implanted stimulator: Bring the remote control.  ? A legal guardian: Bring a copy of the certified (court-stamped) guardianship papers.  Please remove any jewelry, including body piercings. Leave jewelry and other valuables at home.  If you're going home the day of surgery    You must have a responsible adult drive you home. They should stay with you overnight as well.    If you don't have someone to stay with you, and you aren't safe to go home alone, we may keep you overnight. Insurance often won't pay for this.  After surgery  If it's hard to control your pain or you need more pain medicine, please call your surgeon's office.  Questions?   If you have any questions for your care team, list them here: _________________________________________________________________________________________________________________________________________________________________________ ____________________________________ ____________________________________ ____________________________________  For informational purposes only. Not to replace the advice of your health care provider. Copyright   2003, 2019 [facility]. All rights reserved. Clinically reviewed by Mya Perez MD. [employer] [id number] - REV 07/21.

## 2022-09-08 NOTE — PROGRESS NOTES
[facility]  [address]  [place] [address]  Phone: [phone]  Fax: [phone]  Primary Provider: Jose Duffy  Pre-op Performing Provider: GAYE LANGSTON      PREOPERATIVE EVALUATION:  Today's date: 9/8/2022    Corinne G Hultman is a 72 year old female who presents for a preoperative evaluation.    Surgical Information:  Surgery/Procedure: Cataract  Surgery Location: [facility]  Surgeon: Dr. Camarillo  Surgery Date: 09/13, 09/27  Time of Surgery:    Where patient plans to recover: At home with family  Fax number for surgical facility: [phone]    Type of Anesthesia Anticipated: to be determined    Assessment & Plan     The proposed surgical procedure is considered LOW risk.    Problem List Items Addressed This Visit        Nervous and Auditory    Right cervical radiculopathy      Other Visit Diagnoses     Preop general physical exam    -  Primary        She is optimized for upcoming cataract procedures without further work-up or concerns.       Risks and Recommendations:  The patient has the following additional risks and recommendations for perioperative complications:   - No identified additional risk factors other than previously addressed    Medication Instructions:  Patient is to take all scheduled medications on the day of surgery    RECOMMENDATION:  APPROVAL GIVEN to proceed with proposed procedure, without further diagnostic evaluation.    23 minutes spent on the date of the encounter doing chart review, history and exam, documentation and further activities per the note        Subjective     HPI related to upcoming procedure: She comes in today for preop clearance for cataract surgery September 13 and September 27 with Dr. Morgan.  She has not had any recent concerns.  Reviewed preop questionnaire below, remote history of DVT Maude oral contraception's, no concerns since.  She also had some anemia postpartum, again no concerns since menopause.  She  has not had any issues with anesthesia in the past, her mother was sensitive to this.  She has been feeling well.  Has had NSAIDs on hold pending upcoming procedure.    Preop Questions 9/8/2022   1. Have you ever had a heart attack or stroke? No   2. Have you ever had surgery on your heart or blood vessels, such as a stent placement, a coronary artery bypass, or surgery on an artery in your head, neck, heart, or legs? No   3. Do you have chest pain with activity? No   4. Do you have a history of  heart failure? No   5. Do you currently have a cold, bronchitis or symptoms of other infection? No   6. Do you have a cough, shortness of breath, or wheezing? No   7. Do you or anyone in your family have previous history of blood clots? YES - on OCP had DVT   8. Do you or does anyone in your family have a serious bleeding problem such as prolonged bleeding following surgeries or cuts? No   9. Have you ever had problems with anemia or been told to take iron pills? YES - post partum   10. Have you had any abnormal blood loss such as black, tarry or bloody stools, or abnormal vaginal bleeding? No   11. Have you ever had a blood transfusion? No   12. Are you willing to have a blood transfusion if it is medically needed before, during, or after your surgery? Yes   13. Have you or any of your relatives ever had problems with anesthesia? YES - mother was sensitive to anesthesia   14. Do you have sleep apnea, excessive snoring or daytime drowsiness? No   15. Do you have any artifical heart valves or other implanted medical devices like a pacemaker, defibrillator, or continuous glucose monitor? No   16. Do you have artificial joints? No   17. Are you allergic to latex? No       Health Care Directive:  Patient does not have a Health Care Directive or Living Will: Has paperwork to complete    Preoperative Review of :   reviewed - controlled substances reflected in medication list.  Tylenol #3 for chronic neck pain    Status of  Chronic Conditions:  See problem list for active medical problems.  Problems all longstanding and stable, except as noted/documented.  See ROS for pertinent symptoms related to these conditions.      Review of Systems  CONSTITUTIONAL: NEGATIVE for fever, chills, change in weight  INTEGUMENTARY/SKIN: NEGATIVE for worrisome rashes, moles or lesions  EYES: NEGATIVE for vision changes or irritation  ENT/MOUTH: NEGATIVE for ear, mouth and throat problems  RESP: NEGATIVE for significant cough or SOB  CV: NEGATIVE for chest pain, palpitations or peripheral edema  GI: NEGATIVE for nausea, abdominal pain, heartburn, or change in bowel habits  : NEGATIVE for frequency, dysuria, or hematuria  MUSCULOSKELETAL:chronic neck pain  NEURO: NEGATIVE for weakness, dizziness or paresthesias  ENDOCRINE: NEGATIVE for temperature intolerance, skin/hair changes  HEME: NEGATIVE for bleeding problems  PSYCHIATRIC: NEGATIVE for changes in mood or affect    Patient Active Problem List    Diagnosis Date Noted     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 01/11/2022     Priority: Medium     Multiple actinic keratoses 01/11/2022     Priority: Medium     Age-related osteoporosis without current pathological fracture 03/10/2021     Priority: Medium     History of chronic dermatitis 01/09/2020     Priority: Medium     Skin lesion of chest wall 04/27/2018     Priority: Medium     Tension headache 01/26/2018     Priority: Medium     Disorder of bone and cartilage 01/26/2018     Priority: Medium     Closed fracture of proximal end of left humerus with routine healing 01/04/2018     Priority: Medium     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) 08/15/2016     Priority: Medium     Hyperlipidemia 09/17/2015     Priority: Medium     Right cervical radiculopathy 07/23/2012     Priority: Medium     History of follicular lymphoma 01/03/2011     Priority: Medium      Past Medical History:   Diagnosis Date     Actinic keratosis      2/22/2013     Age-related osteoporosis without current pathological fracture     on Fosamax for 17 years--stopped 2011     Asymptomatic menopausal state     age 50     Closed displaced fracture of lateral malleolus of fibula     5/17/2010     Disorder of cartilage     No Comments Provided     Displaced fracture of lateral malleolus of unspecified fibula, initial encounter for open fracture type I or II     No Comments Provided     Dizziness and giddiness     No Comments Provided     Fracture of upper end of left humerus with routine healing     1/4/2018     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) 8/15/2016     Other cervical disc degeneration, unspecified cervical region     No Comments Provided     Other specified types of non-hodgkin lymphoma, extranodal and solid organ sites (H)     2011     Personal history of other malignant neoplasm of skin (CODE)     3/28/2017     Rheumatic fever without heart involvement     10+ years ago     Tension-type headache, not intractable     No Comments Provided     Past Surgical History:   Procedure Laterality Date     BIOPSY BREAST      mid 1990s,benign     COLONOSCOPY      age 55,normal 01/01/2005     COLONOSCOPY  12/10/2015    12/10/2015,lnormal--repeat 10 years, 12/10/2025     FUSION CERVICAL ANTERIOR ONE LEVEL  12/2012    C5-6 on left with discectomy     LAMINECTOMY LUMBAR ONE LEVEL      1984,L4-5     LAPAROSCOPIC TUBAL LIGATION      FOR ECTOPIC PREGNANCY     OTHER SURGICAL HISTORY      ,[id number],SKIN BIOPSY,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     OTHER SURGICAL HISTORY      1974,57289.0,[place] ECTOP PREG BY LAPAROSCOPE,Left,left tube and ovary removed     TONSILLECTOMY      1959     Current Outpatient Medications   Medication Sig Dispense Refill     acetaminophen-codeine (TYLENOL #3) 300-30 MG tablet TAKE 1 TO 2 TABLETS BY MOUTH EVERY 6 HOURS AS NEEDED FOR SEVERE PAIN 150 tablet 0     calcium carbonate (OS-CAROL) 600 MG tablet Take by mouth 2 times  "daily       celecoxib (CELEBREX) 200 MG capsule Take 1 capsule (200 mg) by mouth daily 90 capsule 4     cholecalciferol 50 MCG (2000 UT) tablet Take 2,000 Units by mouth daily         No Known Allergies     Social History     Tobacco Use     Smoking status: Never Smoker     Smokeless tobacco: Never Used   Substance Use Topics     Alcohol use: Not Currently     Alcohol/week: 1.0 standard drink     Comment:  once weekly she will have a glass of beer or wine at holiday gatherings     Family History   Problem Relation Age of Onset     Heart Disease Child      Heart Disease Mother         Heart Disease, after cardiac bypass, age 71     Heart Disease Father         Heart Disease,congestive heart failure     Breast Cancer Sister      Hypertension Brother      Diabetes Brother      Breast Cancer Niece      Hypertension Sister      Hypertension Sister      Hypertension Sister      Emphysema Sister      No Known Problems Brother      Heart Disease Brother      Colon Cancer Paternal Half-Brother      History   Drug Use No         Objective     /80   Pulse 76   Temp 98.3  F (36.8  C)   Resp 16   Ht 1.549 m (5' 1\")   Wt 54.4 kg (120 lb)   SpO2 98%   BMI 22.67 kg/m      Physical Exam    GENERAL APPEARANCE: healthy, alert and no distress     EYES: EOMI, PERRL     NECK: no adenopathy, no asymmetry, masses, or scars and thyroid normal to palpation     RESP: lungs clear to auscultation - no rales, rhonchi or wheezes     CV: regular rates and rhythm, normal S1 S2, no S3 or S4 and no murmur, click or rub     ABDOMEN:  soft, nontender     SKIN: no suspicious lesions or rashes     NEURO: Normal strength and tone, sensory exam grossly normal, mentation intact and speech normal     PSYCH: mentation appears normal. and affect normal/bright     LYMPHATICS: No cervical adenopathy    Recent Labs   Lab Test 21  0855 20  1003   NA  --  135   POTASSIUM  --  4.3   CR 0.74 0.73        Diagnostics:  No labs were ordered " during this visit.   No EKG required for low risk surgery (cataract, skin procedure, breast biopsy, etc).    Revised Cardiac Risk Index (RCRI):  The patient has the following serious cardiovascular risks for perioperative complications:   - No serious cardiac risks = 0 points     RCRI Interpretation: 0 points: Class I (very low risk - 0.4% complication rate)           Signed Electronically by: JOHN Neal CNP  Copy of this evaluation report is provided to requesting physician.

## 2022-11-08 DIAGNOSIS — M54.12 RIGHT CERVICAL RADICULOPATHY: ICD-10-CM

## 2022-11-10 NOTE — TELEPHONE ENCOUNTER
[id number]  sent Rx request for the following:      Requested Prescriptions   Pending Prescriptions Disp Refills     acetaminophen-codeine (TYLENOL #3) 300-30 MG tablet [Pharmacy Med Name: ACETAMINOPHEN/COD #3 (300/30MG) TAB] 150 tablet      Sig: TAKE 1 TO 2 TABLETS BY MOUTH EVERY 6 HOURS AS NEEDED FOR SEVERE PAIN       There is no refill protocol information for this order        Last Prescription Date:   06/15/22  Last Fill Qty/Refills:         150, R-0  Last Office Visit:              09/08/22   Future Office visit:             Next 5 appointments (look out 90 days)    Nov 29, 2022  8:20 AM  Return Visit with Ashia Dill MD  [facility] ([facility] ) [address]  [place] [id number] [phone]        Routing to covering provider as PCP is out of the office.     Marcelina Piña RN on 11/10/2022 at 2:26 PM

## 2023-01-27 NOTE — PROGRESS NOTES
Pre-Visit Planning   Next 5 appointments (look out 90 days)    Jan 30, 2023  2:40 PM  PHYSICAL with Jose Duffy MD  [facility] ([facility] ) [address]  [place] [address] [phone]        Appointment Notes for this encounter:   Annual medicare wellness    Questionnaires Reviewed/Assigned  Additional questionnaires assigned: fall, phq2    Patient preferred phone number: [phone]      Contacted patient via phone/[facility]. Are there any additional questions or concerns you'd like to review with your provider during your visit? No    Visit is preventive. Reviewed purpose of preventive visit with patient.    Meds  Home Meds reviewed and updated  Refills pended    Entered patient-preferred pharmacy.     Current Outpatient Medications   Medication     acetaminophen-codeine (TYLENOL #3) 300-30 MG tablet     calcium carbonate (OS-CAROL) 600 MG tablet     celecoxib (CELEBREX) 200 MG capsule     cholecalciferol 50 MCG (2000 UT) tablet     No current facility-administered medications for this visit.     [employer]  Patient is active on [facility].    Questionnaire Review   Offered information on completing questionnaires via [facility].  Advised patient to arrive early in order to complete questionnaires.    Call Summary  Advised patient to call back at [phone] if needed.     Corinne R Thayer, RN on 1/27/2023 at 4:42 PM

## 2023-01-30 ENCOUNTER — OFFICE VISIT (OUTPATIENT)
Dept: FAMILY MEDICINE | Facility: OTHER | Age: 73
End: 2023-01-30
Attending: FAMILY MEDICINE
Payer: MEDICARE

## 2023-01-30 ENCOUNTER — HOSPITAL ENCOUNTER (OUTPATIENT)
Dept: MAMMOGRAPHY | Facility: OTHER | Age: 73
Discharge: HOME | End: 2023-01-30
Attending: FAMILY MEDICINE
Payer: MEDICARE

## 2023-01-30 VITALS
OXYGEN SATURATION: 97 % | RESPIRATION RATE: 20 BRPM | SYSTOLIC BLOOD PRESSURE: 128 MMHG | TEMPERATURE: 98.2 F | WEIGHT: 119 LBS | HEIGHT: 61 IN | DIASTOLIC BLOOD PRESSURE: 76 MMHG | BODY MASS INDEX: 22.47 KG/M2 | HEART RATE: 98 BPM

## 2023-01-30 DIAGNOSIS — Z85.72 HISTORY OF FOLLICULAR LYMPHOMA: ICD-10-CM

## 2023-01-30 DIAGNOSIS — Z12.31 VISIT FOR SCREENING MAMMOGRAM: ICD-10-CM

## 2023-01-30 DIAGNOSIS — Z00.00 ENCOUNTER FOR MEDICARE ANNUAL WELLNESS EXAM: Primary | ICD-10-CM

## 2023-01-30 DIAGNOSIS — H61.23 BILATERAL IMPACTED CERUMEN: ICD-10-CM

## 2023-01-30 DIAGNOSIS — L57.0 MULTIPLE ACTINIC KERATOSES: ICD-10-CM

## 2023-01-30 DIAGNOSIS — Z51.81 ENCOUNTER FOR THERAPEUTIC DRUG LEVEL MONITORING: ICD-10-CM

## 2023-01-30 DIAGNOSIS — Z23 FLU VACCINE NEED: ICD-10-CM

## 2023-01-30 DIAGNOSIS — M81.0 AGE-RELATED OSTEOPOROSIS WITHOUT CURRENT PATHOLOGICAL FRACTURE: ICD-10-CM

## 2023-01-30 DIAGNOSIS — M50.10 CERVICAL RADICULOPATHY DUE TO INTERVERTEBRAL DISC DISORDER: ICD-10-CM

## 2023-01-30 DIAGNOSIS — M54.16 LEFT LUMBAR RADICULOPATHY: ICD-10-CM

## 2023-01-30 LAB
ALBUMIN SERPL BCG-MCNC: 4.9 G/DL (ref 3.5–5.2)
ALP SERPL-CCNC: 62 U/L (ref 35–104)
ALT SERPL W P-5'-P-CCNC: 23 U/L (ref 10–35)
ANION GAP SERPL CALCULATED.3IONS-SCNC: 9 MMOL/L (ref 7–15)
AST SERPL W P-5'-P-CCNC: 31 U/L (ref 10–35)
BILIRUB SERPL-MCNC: 0.4 MG/DL
BUN SERPL-MCNC: 15.1 MG/DL (ref 8–23)
CALCIUM SERPL-MCNC: 10.1 MG/DL (ref 8.8–10.2)
CHLORIDE SERPL-SCNC: 95 MMOL/L (ref 98–107)
CREAT SERPL-MCNC: 0.71 MG/DL (ref 0.51–0.95)
CREAT UR-MCNC: 71 MG/DL
DEPRECATED HCO3 PLAS-SCNC: 28 MMOL/L (ref 22–29)
ERYTHROCYTE [DISTWIDTH] IN BLOOD BY AUTOMATED COUNT: 13.1 % (ref 10–15)
GFR SERPL CREATININE-BSD FRML MDRD: 90 ML/MIN/1.73M2
GLUCOSE SERPL-MCNC: 97 MG/DL (ref 70–99)
HCT VFR BLD AUTO: 36.1 % (ref 35–47)
HGB BLD-MCNC: 12.4 G/DL (ref 11.7–15.7)
HOLD SPECIMEN: NORMAL
MCH RBC QN AUTO: 31.6 PG (ref 26.5–33)
MCHC RBC AUTO-ENTMCNC: 34.3 G/DL (ref 31.5–36.5)
MCV RBC AUTO: 92 FL (ref 78–100)
PLATELET # BLD AUTO: 218 10E3/UL (ref 150–450)
POTASSIUM SERPL-SCNC: 4 MMOL/L (ref 3.4–5.3)
PROT SERPL-MCNC: 7.2 G/DL (ref 6.4–8.3)
RBC # BLD AUTO: 3.92 10E6/UL (ref 3.8–5.2)
SODIUM SERPL-SCNC: 132 MMOL/L (ref 136–145)
WBC # BLD AUTO: 6.5 10E3/UL (ref 4–11)

## 2023-01-30 PROCEDURE — 80053 COMPREHEN METABOLIC PANEL: CPT | Mod: ZL | Performed by: FAMILY MEDICINE

## 2023-01-30 PROCEDURE — 80307 DRUG TEST PRSMV CHEM ANLYZR: CPT | Mod: ZL | Performed by: FAMILY MEDICINE

## 2023-01-30 PROCEDURE — 77067 SCR MAMMO BI INCL CAD: CPT

## 2023-01-30 PROCEDURE — 85027 COMPLETE CBC AUTOMATED: CPT | Mod: ZL | Performed by: FAMILY MEDICINE

## 2023-01-30 PROCEDURE — 36415 COLL VENOUS BLD VENIPUNCTURE: CPT | Mod: ZL | Performed by: FAMILY MEDICINE

## 2023-01-30 PROCEDURE — G0439 PPPS, SUBSEQ VISIT: HCPCS | Mod: 52 | Performed by: FAMILY MEDICINE

## 2023-01-30 PROCEDURE — 99213 OFFICE O/P EST LOW 20 MIN: CPT | Mod: 25 | Performed by: FAMILY MEDICINE

## 2023-01-30 PROCEDURE — G0008 ADMIN INFLUENZA VIRUS VAC: HCPCS

## 2023-01-30 PROCEDURE — G0463 HOSPITAL OUTPT CLINIC VISIT: HCPCS | Mod: 25

## 2023-01-30 RX ORDER — CELECOXIB 200 MG/1
200 CAPSULE ORAL DAILY
Qty: 90 CAPSULE | Refills: 4 | Status: SHIPPED | OUTPATIENT
Start: 2023-01-30 | End: 2024-01-31

## 2023-01-30 RX ORDER — FLUOROURACIL 50 MG/G
CREAM TOPICAL 2 TIMES DAILY
Qty: 40 G | Refills: 0 | Status: SHIPPED | OUTPATIENT
Start: 2023-01-30 | End: 2024-01-31

## 2023-01-30 ASSESSMENT — ANXIETY QUESTIONNAIRES
5. BEING SO RESTLESS THAT IT IS HARD TO SIT STILL: NOT AT ALL
GAD7 TOTAL SCORE: 0
7. FEELING AFRAID AS IF SOMETHING AWFUL MIGHT HAPPEN: NOT AT ALL
6. BECOMING EASILY ANNOYED OR IRRITABLE: NOT AT ALL
2. NOT BEING ABLE TO STOP OR CONTROL WORRYING: NOT AT ALL
GAD7 TOTAL SCORE: 0
3. WORRYING TOO MUCH ABOUT DIFFERENT THINGS: NOT AT ALL
IF YOU CHECKED OFF ANY PROBLEMS ON THIS QUESTIONNAIRE, HOW DIFFICULT HAVE THESE PROBLEMS MADE IT FOR YOU TO DO YOUR WORK, TAKE CARE OF THINGS AT HOME, OR GET ALONG WITH OTHER PEOPLE: NOT DIFFICULT AT ALL
1. FEELING NERVOUS, ANXIOUS, OR ON EDGE: NOT AT ALL

## 2023-01-30 ASSESSMENT — ENCOUNTER SYMPTOMS
ARTHRALGIAS: 0
HEADACHES: 0
HEMATOCHEZIA: 0
ABDOMINAL PAIN: 0
NERVOUS/ANXIOUS: 0
HEARTBURN: 0
WEAKNESS: 0
BREAST MASS: 0
JOINT SWELLING: 0
CHILLS: 0
SORE THROAT: 0
NAUSEA: 0
FREQUENCY: 0
COUGH: 0
PARESTHESIAS: 0
DIARRHEA: 0
EYE PAIN: 0
DIZZINESS: 0
PALPITATIONS: 0
MYALGIAS: 1
HEMATURIA: 0
DYSURIA: 0
CONSTIPATION: 0
FEVER: 0
SHORTNESS OF BREATH: 0

## 2023-01-30 ASSESSMENT — ACTIVITIES OF DAILY LIVING (ADL): CURRENT_FUNCTION: NO ASSISTANCE NEEDED

## 2023-01-30 ASSESSMENT — PATIENT HEALTH QUESTIONNAIRE - PHQ9
5. POOR APPETITE OR OVEREATING: NOT AT ALL
SUM OF ALL RESPONSES TO PHQ QUESTIONS 1-9: 0

## 2023-01-30 ASSESSMENT — PAIN SCALES - GENERAL: PAINLEVEL: MILD PAIN (3)

## 2023-01-30 NOTE — NURSING NOTE
"Patient presents to the clinic for medicare wellness.  Last administration of Tylenol #3 was a couple of days ago.    Contract signed today, and TOX pending.    FOOD SECURITY SCREENING QUESTIONS:    The next two questions are to help us understand your food security.  If you are feeling you need any assistance in this area, we have resources available to support you today.    Hunger Vital Signs:  Within the past 12 months we worried whether our food would run out before we got money to buy more. Never  Within the past 12 months the food we bought just didn't last and we didn't have money to get more. Never    Advance Care Directive on file? no  Advance Care Directive provided to patient? Yes.      Chief Complaint   Patient presents with     Medicare Visit     Wellness         Initial /76 (BP Location: Right arm, Patient Position: Sitting, Cuff Size: Adult Regular)   Pulse 98   Temp 98.2  F (36.8  C) (Tympanic)   Resp 20   Ht 1.543 m (5' 0.75\")   Wt 54 kg (119 lb)   SpO2 97%   BMI 22.67 kg/m   Estimated body mass index is 22.67 kg/m  as calculated from the following:    Height as of this encounter: 1.543 m (5' 0.75\").    Weight as of this encounter: 54 kg (119 lb).  Medication Reconciliation: complete        Alesha Ortiz LPN       "

## 2023-01-30 NOTE — LETTER
Opioid / Opioid Plus Controlled Substance Agreement    This is an agreement between you and your provider about the safe and appropriate use of controlled substance/opioids prescribed by your care team. Controlled substances are medicines that can cause physical and mental dependence (abuse).    There are strict laws about having and using these medicines. We here at [facility] are committing to working with you in your efforts to get better. To support you in this work, we ll help you schedule regular office appointments for medicine refills. If we must cancel or change your appointment for any reason, we ll make sure you have enough medicine to last until your next appointment.     As a Provider, I will:    Listen carefully to your concerns and treat you with respect.     Recommend a treatment plan that I believe is in your best interest. This plan may involve therapies other than opioid pain medication.     Talk with you often about the possible benefits, and the risk of harm of any medicine that we prescribe for you.     Provide a plan on how to taper (discontinue or go off) using this medicine if the decision is made to stop its use.    As a Patient, I understand that opioid(s):     Are a controlled substance prescribed by my care team to help me function or work and manage my condition(s).     Are strong medicines and can cause serious side effects such as:    Drowsiness, which can seriously affect my driving ability    A lower breathing rate, enough to cause death    Harm to my thinking ability     Depression     Abuse of and addiction to this medicine    Need to be taken exactly as prescribed. Combining opioids with certain medicines or chemicals (such as illegal drugs, sedatives, sleeping pills, and benzodiazepines) can be dangerous or even fatal. If I stop opioids suddenly, I may have severe withdrawal symptoms.    Do not work for all types of pain nor for all patients. If they re not helpful, I may  be asked to stop them.        The risks, benefits and side effects of these medicine(s) were explained to me. I agree that:  1. I will take part in other treatments as advised by my care team. This may be psychiatry or counseling, physical therapy, behavioral therapy, group treatment or a referral to a specialist.     2. I will keep all my appointments. I understand that this is part of the monitoring of opioids. My care team may require an office visit for EVERY opioid/controlled substance refill. If I miss appointments or don t follow instructions, my care team may stop my medicine.    3. I will take my medicines as prescribed. I will not change the dose or schedule unless my care team tells me to. There will be no refills if I run out early.     4. I may be asked to come to the clinic and complete a urine drug test or complete a pill count at any time. If I don t give a urine sample or participate in a pill count, the care team may stop my medicine.    5. I will only receive prescriptions from this clinic for chronic pain. If I am treated by another provider for acute pain issues, I will tell them that I am taking opioid pain medication for chronic pain and that I have a treatment agreement with this provider. I will inform my [facility] care team within one business day if I am given a prescription for any pain medication by another healthcare provider. My [facility] care team can contact other providers and pharmacists about my use of any medicines.    6. It is up to me to make sure that I don t run out of my medicines on weekends or holidays. If my care team is willing to refill my opioid prescription without a visit, I must request refills only during office hours. Refills may take up to 3 business days to process. I will use one pharmacy to fill all my opioid and other controlled substance prescriptions. I will notify the clinic about any changes to my insurance or medication  availability.    7. I am responsible for my prescriptions. If the medicine/prescription is lost, stolen or destroyed, it will not be replaced. I also agree not to share controlled substance medicines with anyone.    8. I am aware I should not use any illegal or recreational drugs. I agree not to drink alcohol unless my care team says I can.       9. If I enroll in the [place] [facility] program, I will tell my care team prior to my next refill.     10. I will tell my care team right away if I become pregnant, have a new medical problem treated outside of my regular clinic, or have a change in my medications.    11. I understand that this medicine can affect my thinking, judgment and reaction time. Alcohol and drugs affect the brain and body, which can affect the safety of my driving. Being under the influence of alcohol or drugs can affect my decision-making, behaviors, personal safety, and the safety of others. Driving while impaired (DWI) can occur if a person is driving, operating, or in physical control of a car, motorcycle, boat, snowmobile, ATV, motorbike, off-road vehicle, or any other motor vehicle ([place] Statute 169A.20). I understand the risk if I choose to drive or operate any vehicle or machinery.    I understand that if I do not follow any of the conditions above, my prescriptions or treatment may be stopped or changed.          Opioids  What You Need to Know    What are opioids?   Opioids are pain medicines that must be prescribed by a doctor. They are also known as narcotics.     Examples are:   1. morphine (MS Contin, Vianney)  2. oxycodone (Oxycontin)  3. oxycodone and acetaminophen (Percocet)  4. hydrocodone and acetaminophen (Vicodin, Norco)   5. fentanyl patch (Duragesic)   6. hydromorphone (Dilaudid)   7. methadone  8. codeine (Tylenol #3)     What do opioids do well?   Opioids are best for severe short-term pain such as after a surgery or injury. They may work well for cancer pain. They may  help some people with long-lasting (chronic) pain.     What do opioids NOT do well?   Opioids never get rid of pain entirely, and they don t work well for most patients with chronic pain. Opioids don t reduce swelling, one of the causes of pain.                                    Other ways to manage chronic pain and improve function include:       Treat the health problem that may be causing pain    Anti-inflammation medicines, which reduce swelling and tenderness, such as ibuprofen (Advil, Motrin) or naproxen (Aleve)    Acetaminophen (Tylenol)    Antidepressants and anti-seizure medicines, especially for nerve pain    Topical treatments such as patches or creams    Injections or nerve blocks    Chiropractic or osteopathic treatment    Acupuncture, massage, deep breathing, meditation, visual imagery, aromatherapy    Use heat or ice at the pain site    Physical therapy     Exercise    Stop smoking    Take part in therapy       Risks and side effects     Talk to your doctor before you start or decide to keep taking opioids. Possible side effects include:      Lowering your breathing rate enough to cause death    Overdose, including death, especially if taking higher than prescribed doses    Worse depression symptoms; less pleasure in things you usually enjoy    Feeling tired or sluggish    Slower thoughts or cloudy thinking    Being more sensitive to pain over time; pain is harder to control    Trouble sleeping or restless sleep    Changes in hormone levels (for example, less testosterone)    Changes in sex drive or ability to have sex    Constipation    Unsafe driving    Itching and sweating    Dizziness    Nausea, throwing up and dry mouth    What else should I know about opioids?    Opioids may lead to dependence, tolerance, or addiction.      Dependence means that if you stop or reduce the medicine too quickly, you will have withdrawal symptoms. These include loose poop (diarrhea), jitters, flu-like symptoms,  nervousness and tremors. Dependence is not the same as addiction.                       Tolerance means needing higher doses over time to get the same effect. This may increase the chance of serious side effects.      Addiction is when people improperly use a substance that harms their body, their mind or their relations with others. Use of opiates can cause a relapse of addiction if you have a history of drug or alcohol abuse.      People who have used opioids for a long time may have a lower quality of life, worse depression, higher levels of pain and more visits to doctors.    You can overdose on opioids. Take these steps to lower your risk of overdose:    1. Recognize the signs:  Signs of overdose include decrease or loss of consciousness (blackout), slowed breathing, trouble waking up and blue lips. If someone is worried about overdose, they should call 911.    2. Talk to your doctor about Narcan (naloxone).   If you are at risk for overdose, you may be given a prescription for Narcan. This medicine very quickly reverses the effects of opioids.   If you overdose, a friend or family member can give you Narcan while waiting for the ambulance. They need to know the signs of overdose and how to give Narcan.     3. Don't use alcohol or street drugs.   Taking them with opioids can cause death.    4. Do not take any of these medicines unless your doctor says it s OK. Taking these with opioids can cause death:    Benzodiazepines, such as lorazepam (Ativan), alprazolam (Xanax) or diazepam (Valium)    Muscle relaxers, such as cyclobenzaprine (Flexeril)    Sleeping pills like zolpidem (Ambien)     Other opioids      How to keep you and other people safe while taking opioids:    1. Never share your opioids with others.  Opioid medicines are regulated by the Drug Enforcement Agency (DAVIN). Selling or sharing medications is a criminal act.    2. Be sure to store opioids in a secure place, locked up if possible. Young children  can easily swallow them and overdose.    3. When you are traveling with your medicines, keep them in the original bottles. If you use a pill box, be sure you also carry a copy of your medicine list from your clinic or pharmacy.    4. Safe disposal of opioids    Most pharmacies have places to get rid of medicine, called disposal kiosks. Medicine disposal options are also available in every [facility]. Search your county and  medication disposal  to find more options. You can find more details at:  [email or web address]     I agree that my provider, clinic care team, and pharmacy may work with any city, state or federal law enforcement agency that investigates the misuse, sale, or other diversion of my controlled medicine. I will allow my provider to discuss my care with, or share a copy of, this agreement with any other treating provider, pharmacy or emergency room where I receive care.    I have read this agreement and have asked questions about anything I did not understand.    _______________________________________________________  Patient Signature - Corinne G Hultman _____________________                   Date     _______________________________________________________  Provider Signature - Jose Duffy MD   _____________________                   Date     _______________________________________________________  Witness Signature (required if provider not present while patient signing)   _____________________                   Date

## 2023-01-30 NOTE — PROGRESS NOTES
"SUBJECTIVE:   Corinne is a 72 year old who presents for Preventive Visit.    Patient has been advised of split billing requirements and indicates understanding: Yes  Are you in the first 12 months of your Medicare coverage?  No    Healthy Habits:     In general, how would you rate your overall health?  Good    Frequency of exercise:  4-5 days/week    Duration of exercise:  30-45 minutes    Do you usually eat at least 4 servings of fruit and vegetables a day, include whole grains    & fiber and avoid regularly eating high fat or \"junk\" foods?  Yes    Taking medications regularly:  Yes    Medication side effects:  None    Ability to successfully perform activities of daily living:  No assistance needed    Home Safety:  Throw rugs in the hallway    Hearing Impairment:  Difficulty understanding soft or whispered speech    In the past 6 months, have you been bothered by leaking of urine?  No    In general, how would you rate your overall mental or emotional health?  Excellent      PHQ-2 Total Score: 0    Additional concerns today:  Yes    Osteoporosis managed by Dr Alejandre    History of follicular lymphoma in the neck.  Has been following with oncology, but is 10 years from diagnosis and follow-up changed to just as needed    Last cervical epidural injection May 2022 for more left radiculopathy symptoms. No significant problems now with radicular symptoms. Continues on T#3 for chronic pain    Have you ever done Advance Care Planning? (For example, a Health Directive, POLST, or a discussion with a medical provider or your loved ones about your wishes): No, advance care planning information given to patient to review.  Advanced care planning was discussed at today's visit.       Fall risk  Fallen 2 or more times in the past year?: No  Any fall with injury in the past year?: No    Cognitive Screening   1) Repeat 3 items (Leader, Season, Table)    2) Clock draw: NORMAL  3) 3 item recall: Recalls 3 objects  Results: 3 items " recalled: COGNITIVE IMPAIRMENT LESS LIKELY    Mini-CogTM Copyright MIKE [employer]. Licensed by the author for use in [facility]; reprinted with permission (corrine@[facility]). All rights reserved.      Do you have sleep apnea, excessive snoring or daytime drowsiness?: no           Reviewed and updated as needed this visit by clinical staff   Tobacco  Allergies  Meds      Soc Hx        Reviewed and updated as needed this visit by Provider   Tobacco  Allergies  Meds  Problems  Med Hx  Surg Hx  Fam Hx         Social History     Tobacco Use     Smoking status: Never     Smokeless tobacco: Never   Substance Use Topics     Alcohol use: Not Currently     Alcohol/week: 1.0 standard drink     Comment:  once weekly she will have a glass of beer or wine at holiday gatherings         Alcohol Use 1/30/2023   Prescreen: >3 drinks/day or >7 drinks/week? No   Prescreen: >3 drinks/day or >7 drinks/week? -               Current providers sharing in care for this patient include:   Patient Care Team:  Jose Duffy MD as PCP - General (Family Practice)  Jose Duffy MD as Assigned PCP  Ashia Dill MD as Assigned Surgical Provider    The following health maintenance items are reviewed in Epic and correct as of today:  Health Maintenance   Topic Date Due     COVID-19 Vaccine (1) Never done     ZOSTER IMMUNIZATION (1 of 2) 09/17/2012     INFLUENZA VACCINE (1) 09/01/2022     MAMMO SCREENING  12/15/2022     MEDICARE ANNUAL WELLNESS VISIT  01/30/2024     FALL RISK ASSESSMENT  01/30/2024     COLORECTAL CANCER SCREENING  12/10/2025     LIPID  01/05/2027     ADVANCE CARE PLANNING  01/30/2028     DTAP/TDAP/TD IMMUNIZATION (2 - Td or Tdap) 11/09/2030     DEXA  12/15/2035     HEPATITIS C SCREENING  Completed     PHQ-2 (once per calendar year)  Completed     Pneumococcal Vaccine: 65+ Years  Completed     IPV IMMUNIZATION  Aged Out     MENINGITIS IMMUNIZATION  Aged Out     Patient Active Problem List   Diagnosis     Closed  fracture of proximal end of left humerus with routine healing     History of follicular lymphoma     Right cervical radiculopathy     Tension headache     Hyperlipidemia     Disorder of bone and cartilage     Skin lesion of chest wall     History of chronic dermatitis     Age-related osteoporosis without current pathological fracture     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     Multiple actinic keratoses     Past Surgical History:   Procedure Laterality Date     BIOPSY BREAST      mid ,benign     CATARACT EXTRACTION  2022     COLONOSCOPY      age 55,normal 2005     COLONOSCOPY  12/10/2015    12/10/2015,lnormal--repeat 10 years, 12/10/2025     FUSION CERVICAL ANTERIOR ONE LEVEL  2012    C5-6 on left with discectomy     LAMINECTOMY LUMBAR ONE LEVEL      ,L4-5     LAPAROSCOPIC TUBAL LIGATION      FOR ECTOPIC PREGNANCY     OTHER SURGICAL HISTORY      ,PBN704,SKIN BIOPSY,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     OTHER SURGICAL HISTORY      ,19811.0,[place] ECTOP PREG BY LAPAROSCOPE,Left,left tube and ovary removed     TONSILLECTOMY             Social History     Tobacco Use     Smoking status: Never     Smokeless tobacco: Never   Substance Use Topics     Alcohol use: Not Currently     Alcohol/week: 1.0 standard drink     Comment:  once weekly she will have a glass of beer or wine at holiday gatherings     Family History   Problem Relation Age of Onset     Heart Disease Child      Heart Disease Mother         Heart Disease, after cardiac bypass, age 71     Heart Disease Father         Heart Disease,congestive heart failure     Breast Cancer Sister      Hypertension Brother      Diabetes Brother      Breast Cancer Niece      Hypertension Sister      Hypertension Sister      Hypertension Sister      Emphysema Sister      No Known Problems Brother      Heart Disease Brother      Colon Cancer Paternal Half-Brother          Current Outpatient Medications  "  Medication Sig Dispense Refill     acetaminophen-codeine (TYLENOL #3) 300-30 MG tablet TAKE 1 TO 2 TABLETS BY MOUTH EVERY 6 HOURS AS NEEDED FOR SEVERE PAIN 150 tablet 1     celecoxib (CELEBREX) 200 MG capsule Take 1 capsule (200 mg) by mouth daily 90 capsule 4     fluorouracil (EFUDEX) 5 % external cream Apply topically 2 times daily For 3 weeks then stop. 40 g 0     calcium carbonate (OS-CAROL) 600 MG tablet Take by mouth 2 times daily       cholecalciferol 50 MCG (2000 UT) tablet Take 2,000 Units by mouth daily       No Known Allergies        Review of Systems   Constitutional: Negative for chills and fever.   HENT: Negative for congestion, ear pain, hearing loss and sore throat.    Eyes: Negative for pain and visual disturbance.   Respiratory: Negative for cough and shortness of breath.    Cardiovascular: Negative for chest pain, palpitations and peripheral edema.   Gastrointestinal: Negative for abdominal pain, constipation, diarrhea, heartburn, hematochezia and nausea.   Breasts:  Negative for tenderness, breast mass and discharge.   Genitourinary: Positive for urgency. Negative for dysuria, frequency, genital sores, hematuria, pelvic pain, vaginal bleeding and vaginal discharge.   Musculoskeletal: Positive for myalgias. Negative for arthralgias and joint swelling.   Skin: Negative for rash.   Neurological: Negative for dizziness, weakness, headaches and paresthesias.   Psychiatric/Behavioral: Negative for mood changes. The patient is not nervous/anxious.          OBJECTIVE:   /76 (BP Location: Right arm, Patient Position: Sitting, Cuff Size: Adult Regular)   Pulse 98   Temp 98.2  F (36.8  C) (Tympanic)   Resp 20   Ht 1.543 m (5' 0.75\")   Wt 54 kg (119 lb)   SpO2 97%   BMI 22.67 kg/m   Estimated body mass index is 22.67 kg/m  as calculated from the following:    Height as of this encounter: 1.543 m (5' 0.75\").    Weight as of this encounter: 54 kg (119 lb).  Physical Exam  General Appearance: " Pleasant, alert, appropriate appearance for age. No acute distress  Eye Exam:  Normal external eye, conjunctiva, lids, cornea. FAITH.  Ear Exam: Bilateral cerumen impaction, narrow canals  OroPharynx Exam:  Dental hygiene adequate. Normal buccal mucosa. Normal pharynx.  Neck Exam:  Supple, no masses or nodes.  Thyroid Exam: No nodules or enlargement.  Chest/Respiratory Exam: Normal chest wall and respirations. Clear to auscultation.  Cardiovascular Exam: Regular rate and rhythm. S1, S2, no murmur, click, gallop, or rubs.  Gastrointestinal Exam: Soft, non-tender, no masses or organomegaly.  Musculoskeletal Exam: Back is straight and non-tender, full ROM of upper and lower extremities.  Foot Exam: Left and right foot: good pedal pulses.  Skin: Multiple actinic keratoses on nose, right temple, left temple  Neurologic Exam: Nonfocal, symmetric DTRs, normal gross motor, tone coordination and no tremor.  Psychiatric Exam: Alert and oriented - appropriate affect.      Results for orders placed or performed during the hospital encounter of 01/30/23   MA Screen Bilateral w/Tello     Status: None    Narrative    BILATERAL FULL FIELD DIGITAL SCREENING MAMMOGRAM WITH TOMOSYNTHESIS    Performed on: 1/30/23    Compared to: 12/15/2020, 10/12/2018, 10/11/2017, 09/28/2016, 09/22/2015,   09/18/2014, and 09/06/2013    Technique:  This study was evaluated with the assistance of Computer-Aided   Detection.  Breast Tomosynthesis was used in interpretation.    Findings: The breasts have scattered areas of fibroglandular density.    Vascular calcifications.  There is no radiographic evidence of malignancy.     Impression    IMPRESSION: ACR BI-RADS Category 2: Benign    RECOMMENDED FOLLOW-UP: Annual routine screening mammogram    The results and recommendations of this examination will be communicated   to the patient.        Bryant Garcia MD       Results for orders placed or performed in visit on 01/30/23   Urine Creatinine for Drug  Screen Panel     Status: None   Result Value Ref Range    Creatinine Urine for Drug Screen 71 mg/dL   CBC W PLT No Diff     Status: Normal   Result Value Ref Range    WBC Count 6.5 4.0 - 11.0 10e3/uL    RBC Count 3.92 3.80 - 5.20 10e6/uL    Hemoglobin 12.4 11.7 - 15.7 g/dL    Hematocrit 36.1 35.0 - 47.0 %    MCV 92 78 - 100 fL    MCH 31.6 26.5 - 33.0 pg    MCHC 34.3 31.5 - 36.5 g/dL    RDW 13.1 10.0 - 15.0 %    Platelet Count 218 150 - 450 10e3/uL   Comprehensive Metabolic Panel     Status: Abnormal   Result Value Ref Range    Sodium 132 (L) 136 - 145 mmol/L    Potassium 4.0 3.4 - 5.3 mmol/L    Chloride 95 (L) 98 - 107 mmol/L    Carbon Dioxide (CO2) 28 22 - 29 mmol/L    Anion Gap 9 7 - 15 mmol/L    Urea Nitrogen 15.1 8.0 - 23.0 mg/dL    Creatinine 0.71 0.51 - 0.95 mg/dL    Calcium 10.1 8.8 - 10.2 mg/dL    Glucose 97 70 - 99 mg/dL    Alkaline Phosphatase 62 35 - 104 U/L    AST 31 10 - 35 U/L    ALT 23 10 - 35 U/L    Protein Total 7.2 6.4 - 8.3 g/dL    Albumin 4.9 3.5 - 5.2 g/dL    Bilirubin Total 0.4 <=1.2 mg/dL    GFR Estimate 90 >60 mL/min/1.73m2   Extra Tube     Status: None    Narrative    The following orders were created for panel order Extra Tube.  Procedure                               Abnormality         Status                     ---------                               -----------         ------                     Extra Serum Separator Tu...[[id number]]                      Final result                 Please view results for these tests on the individual orders.   Extra Serum Separator Tube (SST)     Status: None   Result Value Ref Range    Hold Specimen x    Drug Confirmation Panel Urine with Creatinine     Status: None (In process)    Narrative    The following orders were created for panel order Drug Confirmation Panel Urine with Creatinine.  Procedure                               Abnormality         Status                     ---------                               -----------         ------                      Urine Drug Confirmation ...[[id number]]                      In process                 Urine Creatinine for Gerber...[[id number]]                      Final result                 Please view results for these tests on the individual orders.        ASSESSMENT / PLAN:       ICD-10-CM    1. Encounter for Medicare annual wellness exam  Z00.00       2. Cervical radiculopathy due to intervertebral disc disorder  M50.10 acetaminophen-codeine (TYLENOL #3) 300-30 MG tablet     celecoxib (CELEBREX) 200 MG capsule      3. Left lumbar radiculopathy  M54.16 acetaminophen-codeine (TYLENOL #3) 300-30 MG tablet     celecoxib (CELEBREX) 200 MG capsule      4. Encounter for therapeutic drug level monitoring  Z51.81 Drug Confirmation Panel Urine with Creatinine     CBC W PLT No Diff     Comprehensive Metabolic Panel     Drug Confirmation Panel Urine with Creatinine     CBC W PLT No Diff     Comprehensive Metabolic Panel      5. Multiple actinic keratoses  L57.0 fluorouracil (EFUDEX) 5 % external cream      6. Age-related osteoporosis without current pathological fracture  M81.0       7. History of follicular lymphoma  Z85.72       8. Bilateral impacted cerumen  H61.23       9. Flu vaccine need  Z23 INFLUENZA, QUAD, HIGH DOSE, PF, 65YR + (FLUZONE HD)            Patient has been advised of split billing requirements and indicates understanding: Yes    History of C5-6 anterior cervical fusion.  Right and left radiculopathy symptoms in the past, improved with LISA.  No current radiculopathy.  Chronic cervical and lumbar pain.  Using Tylenol 3 sporadically, about 150 pills every 3 months. Refilled for 6 months.  PDMP Review       Value Time User    State PDMP site checked  Yes 1/30/2023  3:34 PM Jose Duffy MD      Updated controlled substance agreement and urine drug monitoring  Refilled celecoxib for chronic pain.  Labs for NSAID monitoring are normal    History of actinic keratoses.  Has been seeing Dr. Dill and receiving  cryotherapy.  Used fluorouracil in the past.  We discussed options for treatment as she has several actinic keratoses.  Typically I would use fluorouracil.  She will treat for 3 weeks with fluorouracil and see if lesions resolve.  If not then see Dr. Dill    Seeing Dr. Alejandre of endocrinology for osteoporosis    History of grade 1 follicular lymphoma.  At oncology follow-up last year, Dr. Cuenca felt she could follow-up just as needed since she is 10 years from diagnosis.    COUNSELING:  Reviewed preventive health counseling, as reflected in patient instructions       Regular exercise       Healthy diet/nutrition       Vision screening       Hearing screening       Immunizations    Shingrix from a pharmacy    Flu vaccine provided    COVID vaccine declined permanently last year       Colon cancer screening - due 2025       No statin with CT calcium score of 0 in 2018        She reports that she has never smoked. She has never used smokeless tobacco.      Appropriate preventive services were discussed with this patient, including applicable screening as appropriate for cardiovascular disease, diabetes, osteopenia/osteoporosis, and glaucoma.  As appropriate for age/gender, discussed screening for colorectal cancer, prostate cancer, breast cancer, and cervical cancer. Checklist reviewing preventive services available has been given to the patient.    Reviewed patients plan of care and provided an AVS. The Basic Care Plan (routine screening as documented in Health Maintenance) for Corinne meets the Care Plan requirement. This Care Plan has been established and reviewed with the Patient.          Jose Duffy MD  [facility]    Identified Health Risks:    The patient was provided with written information regarding signs of hearing loss.

## 2023-01-30 NOTE — PATIENT INSTRUCTIONS
Patient Education   Personalized Prevention Plan  You are due for the preventive services outlined below.  Your care team is available to assist you in scheduling these services.  If you have already completed any of these items, please share that information with your care team to update in your medical record.  Health Maintenance Due   Topic Date Due     Zoster (Shingles) Vaccine (1 of 2) 09/17/2012     Flu Vaccine (1) 09/01/2022     Mammogram  12/15/2022       Signs of Hearing Loss      Hearing much better with one ear can be a sign of hearing loss.   Hearing loss is a problem shared by many people. In fact, it is one of the most common health problems, particularly as people age. Most people age 65 and older have some hearing loss. By age 80, almost everyone does. Hearing loss often occurs slowly over the years. So you may not realize your hearing has gotten worse.  Have your hearing checked  Call your healthcare provider if you:    Have to strain to hear normal conversation    Have to watch other people s faces very carefully to follow what they re saying    Need to ask people to repeat what they ve said    Often misunderstand what people are saying    Turn the volume of the television or radio up so high that others complain    Feel that people are mumbling when they re talking to you    Find that the effort to hear leaves you feeling tired and irritated    Notice, when using the phone, that you hear better with one ear than the other  [employer] last reviewed this educational content on 1/1/2020 2000-2021 [employer]. All rights reserved. This information is not intended as a substitute for professional medical care. Always follow your healthcare professional's instructions.

## 2023-02-02 LAB
CODEINE UR CFM-MCNC: 67 NG/ML
CODEINE/CREAT UR: 94 NG/MG {CREAT}

## 2023-02-28 ENCOUNTER — HOSPITAL ENCOUNTER (OUTPATIENT)
Dept: BONE DENSITY | Facility: OTHER | Age: 73
Discharge: HOME | End: 2023-02-28
Attending: INTERNAL MEDICINE | Admitting: INTERNAL MEDICINE
Payer: MEDICARE

## 2023-02-28 DIAGNOSIS — M81.0 OSTEOPOROSIS: ICD-10-CM

## 2023-02-28 PROCEDURE — 77080 DXA BONE DENSITY AXIAL: CPT

## 2023-03-29 DIAGNOSIS — M54.16 LEFT LUMBAR RADICULOPATHY: ICD-10-CM

## 2023-03-29 DIAGNOSIS — M50.10 CERVICAL RADICULOPATHY DUE TO INTERVERTEBRAL DISC DISORDER: ICD-10-CM

## 2023-03-29 RX ORDER — CELECOXIB 200 MG/1
CAPSULE ORAL
Qty: 90 CAPSULE | Refills: 4 | OUTPATIENT
Start: 2023-03-29

## 2023-03-29 NOTE — TELEPHONE ENCOUNTER
[facility] sent Rx request for the following:   CELECOXIB 200MG CAPSULES   Last Prescription Date:   1/30/23  Last Fill Qty/Refills:         90, R-4    Last Office Visit:              1/30/23   Future Office visit:           None     Redundant refill request refused: Too soon:  Delores Foster RN on 3/29/2023 at 3:49 PM

## 2024-01-25 ASSESSMENT — ENCOUNTER SYMPTOMS
DIZZINESS: 1
HEMATOCHEZIA: 0
NAUSEA: 0
FREQUENCY: 0
NERVOUS/ANXIOUS: 0
EYE PAIN: 0
DYSURIA: 0
HEADACHES: 0
ARTHRALGIAS: 1
JOINT SWELLING: 0
COUGH: 0
WEAKNESS: 0
DIARRHEA: 0
BREAST MASS: 0
CHILLS: 0
FEVER: 0
ABDOMINAL PAIN: 0
SORE THROAT: 0
CONSTIPATION: 0
PARESTHESIAS: 0
PALPITATIONS: 0
HEMATURIA: 0
HEARTBURN: 0
SHORTNESS OF BREATH: 0
MYALGIAS: 1

## 2024-01-25 ASSESSMENT — ACTIVITIES OF DAILY LIVING (ADL): CURRENT_FUNCTION: NO ASSISTANCE NEEDED

## 2024-01-31 ENCOUNTER — OFFICE VISIT (OUTPATIENT)
Dept: FAMILY MEDICINE | Facility: OTHER | Age: 74
End: 2024-01-31
Attending: STUDENT IN AN ORGANIZED HEALTH CARE EDUCATION/TRAINING PROGRAM
Payer: COMMERCIAL

## 2024-01-31 VITALS
HEART RATE: 88 BPM | DIASTOLIC BLOOD PRESSURE: 80 MMHG | SYSTOLIC BLOOD PRESSURE: 136 MMHG | BODY MASS INDEX: 22.87 KG/M2 | OXYGEN SATURATION: 94 % | TEMPERATURE: 97 F | RESPIRATION RATE: 16 BRPM | HEIGHT: 61 IN | WEIGHT: 121.13 LBS

## 2024-01-31 DIAGNOSIS — Z00.00 MEDICARE ANNUAL WELLNESS VISIT, SUBSEQUENT: Primary | ICD-10-CM

## 2024-01-31 DIAGNOSIS — M54.16 LEFT LUMBAR RADICULOPATHY: ICD-10-CM

## 2024-01-31 DIAGNOSIS — M50.10 CERVICAL RADICULOPATHY DUE TO INTERVERTEBRAL DISC DISORDER: ICD-10-CM

## 2024-01-31 LAB
ALBUMIN SERPL BCG-MCNC: 4.7 G/DL (ref 3.5–5.2)
ALP SERPL-CCNC: 79 U/L (ref 40–150)
ALT SERPL W P-5'-P-CCNC: 18 U/L (ref 0–50)
ANION GAP SERPL CALCULATED.3IONS-SCNC: 8 MMOL/L (ref 7–15)
AST SERPL W P-5'-P-CCNC: 29 U/L (ref 0–45)
BASOPHILS # BLD AUTO: 0 10E3/UL (ref 0–0.2)
BASOPHILS NFR BLD AUTO: 1 %
BILIRUB SERPL-MCNC: 0.3 MG/DL
BUN SERPL-MCNC: 16.8 MG/DL (ref 8–23)
CALCIUM SERPL-MCNC: 10.2 MG/DL (ref 8.8–10.2)
CHLORIDE SERPL-SCNC: 99 MMOL/L (ref 98–107)
CREAT SERPL-MCNC: 0.68 MG/DL (ref 0.51–0.95)
DEPRECATED HCO3 PLAS-SCNC: 29 MMOL/L (ref 22–29)
EGFRCR SERPLBLD CKD-EPI 2021: >90 ML/MIN/1.73M2
EOSINOPHIL # BLD AUTO: 0.3 10E3/UL (ref 0–0.7)
EOSINOPHIL NFR BLD AUTO: 4 %
ERYTHROCYTE [DISTWIDTH] IN BLOOD BY AUTOMATED COUNT: 12.5 % (ref 10–15)
GLUCOSE SERPL-MCNC: 91 MG/DL (ref 70–99)
HCT VFR BLD AUTO: 35.4 % (ref 35–47)
HGB BLD-MCNC: 12.1 G/DL (ref 11.7–15.7)
IMM GRANULOCYTES # BLD: 0 10E3/UL
IMM GRANULOCYTES NFR BLD: 0 %
LYMPHOCYTES # BLD AUTO: 2.1 10E3/UL (ref 0.8–5.3)
LYMPHOCYTES NFR BLD AUTO: 28 %
MCH RBC QN AUTO: 32.4 PG (ref 26.5–33)
MCHC RBC AUTO-ENTMCNC: 34.2 G/DL (ref 31.5–36.5)
MCV RBC AUTO: 95 FL (ref 78–100)
MONOCYTES # BLD AUTO: 1 10E3/UL (ref 0–1.3)
MONOCYTES NFR BLD AUTO: 14 %
NEUTROPHILS # BLD AUTO: 4 10E3/UL (ref 1.6–8.3)
NEUTROPHILS NFR BLD AUTO: 53 %
NRBC # BLD AUTO: 0 10E3/UL
NRBC BLD AUTO-RTO: 0 /100
PLATELET # BLD AUTO: 243 10E3/UL (ref 150–450)
POTASSIUM SERPL-SCNC: 4.6 MMOL/L (ref 3.4–5.3)
PROT SERPL-MCNC: 7.4 G/DL (ref 6.4–8.3)
RBC # BLD AUTO: 3.73 10E6/UL (ref 3.8–5.2)
SODIUM SERPL-SCNC: 136 MMOL/L (ref 135–145)
WBC # BLD AUTO: 7.4 10E3/UL (ref 4–11)

## 2024-01-31 PROCEDURE — 36415 COLL VENOUS BLD VENIPUNCTURE: CPT | Mod: ZL | Performed by: STUDENT IN AN ORGANIZED HEALTH CARE EDUCATION/TRAINING PROGRAM

## 2024-01-31 PROCEDURE — 85025 COMPLETE CBC W/AUTO DIFF WBC: CPT | Mod: ZL | Performed by: STUDENT IN AN ORGANIZED HEALTH CARE EDUCATION/TRAINING PROGRAM

## 2024-01-31 PROCEDURE — G0439 PPPS, SUBSEQ VISIT: HCPCS | Performed by: STUDENT IN AN ORGANIZED HEALTH CARE EDUCATION/TRAINING PROGRAM

## 2024-01-31 PROCEDURE — 99213 OFFICE O/P EST LOW 20 MIN: CPT | Mod: 25 | Performed by: STUDENT IN AN ORGANIZED HEALTH CARE EDUCATION/TRAINING PROGRAM

## 2024-01-31 PROCEDURE — 80053 COMPREHEN METABOLIC PANEL: CPT | Mod: ZL | Performed by: STUDENT IN AN ORGANIZED HEALTH CARE EDUCATION/TRAINING PROGRAM

## 2024-01-31 PROCEDURE — G0463 HOSPITAL OUTPT CLINIC VISIT: HCPCS

## 2024-01-31 RX ORDER — ACETAMINOPHEN AND CODEINE PHOSPHATE 300; 30 MG/1; MG/1
1-2 TABLET ORAL EVERY 6 HOURS PRN
Qty: 150 TABLET | Refills: 1 | Status: SHIPPED | OUTPATIENT
Start: 2024-01-31 | End: 2024-05-17

## 2024-01-31 RX ORDER — DIPHENHYDRAMINE HCL 25 MG
25 TABLET ORAL EVERY 6 HOURS PRN
COMMUNITY
Start: 2024-01-31

## 2024-01-31 RX ORDER — CELECOXIB 200 MG/1
200 CAPSULE ORAL DAILY
Qty: 90 CAPSULE | Refills: 4 | Status: SHIPPED | OUTPATIENT
Start: 2024-01-31

## 2024-01-31 ASSESSMENT — ENCOUNTER SYMPTOMS
MYALGIAS: 1
COUGH: 0
SHORTNESS OF BREATH: 0
ABDOMINAL PAIN: 0
SORE THROAT: 0
CHILLS: 0
NAUSEA: 0
DIZZINESS: 1
ARTHRALGIAS: 1
FREQUENCY: 0
EYE PAIN: 0
NERVOUS/ANXIOUS: 0
CONSTIPATION: 0
JOINT SWELLING: 0
FEVER: 0
DYSURIA: 0
WEAKNESS: 0
DIARRHEA: 0
HEMATURIA: 0
PALPITATIONS: 0
HEADACHES: 0

## 2024-01-31 ASSESSMENT — PAIN SCALES - GENERAL: PAINLEVEL: MILD PAIN (2)

## 2024-01-31 ASSESSMENT — ACTIVITIES OF DAILY LIVING (ADL): CURRENT_FUNCTION: NO ASSISTANCE NEEDED

## 2024-01-31 NOTE — NURSING NOTE
"Medication Reconciliation: Complete    Cece Herrmann LPN  1/31/2024 9:09 AM  Chief Complaint   Patient presents with    Medicare Visit     Annual wellness exam    Establish Care     With Jemima Saenz MD        Initial /80 (BP Location: Right arm, Patient Position: Sitting, Cuff Size: Adult Regular)   Pulse 88   Temp 97  F (36.1  C) (Tympanic)   Resp 16   Ht 1.549 m (5' 1\")   Wt 54.9 kg (121 lb 2 oz)   SpO2 94%   BMI 22.89 kg/m   Estimated body mass index is 22.89 kg/m  as calculated from the following:    Height as of this encounter: 1.549 m (5' 1\").    Weight as of this encounter: 54.9 kg (121 lb 2 oz).  Medication Review: complete    The next two questions are to help us understand your food security.  If you are feeling you need any assistance in this area, we have resources available to support you today.          1/25/2024   SDOH- Food Insecurity   Within the past 12 months, did you worry that your food would run out before you got money to buy more? N   Within the past 12 months, did the food you bought just not last and you didn t have money to get more? N         Health Care Directive:  Patient does not have a Health Care Directive or Living Will: Advance Directive received and scanned. Click on Code in the patient header to view.    Cece Herrmann LPN      "

## 2024-01-31 NOTE — PROGRESS NOTES
Preventive Care Visit  [facility]  Jemima Francis MD, Family Medicine  Jan 31, 2024    Assessment & Plan     Medicare annual wellness visit, subsequent  She prefers mammograms every other year  - CBC and Differential; Future  - Comprehensive Metabolic Panel; Future  - Comprehensive Metabolic Panel  - CBC and Differential    Cervical radiculopathy due to intervertebral disc disorder  Chronic, due for refill  - celecoxib (CELEBREX) 200 MG capsule; Take 1 capsule (200 mg) by mouth daily  - acetaminophen-codeine (TYLENOL #3) 300-30 MG per tablet; Take 1-2 tablets by mouth every 6 hours as needed for severe pain (breakthrough severe neck pain) TAKE 1 TO 2 TABLETS BY MOUTH EVERY 6 HOURS AS NEEDED FOR SEVERE PAIN    Left lumbar radiculopathy  Chronic due for refill   - celecoxib (CELEBREX) 200 MG capsule; Take 1 capsule (200 mg) by mouth daily  - acetaminophen-codeine (TYLENOL #3) 300-30 MG per tablet; Take 1-2 tablets by mouth every 6 hours as needed for severe pain (breakthrough severe neck pain) TAKE 1 TO 2 TABLETS BY MOUTH EVERY 6 HOURS AS NEEDED FOR SEVERE PAIN                Counseling  Appropriate preventive services were discussed with this patient, including applicable screening as appropriate for fall prevention, nutrition, physical activity, Tobacco-use cessation, weight loss and cognition.  Checklist reviewing preventive services available has been given to the patient.  Updated plan of care.  Patient reported difficulty with activities of daily living were addressed today.Patient reported safety concerns were addressed today.The patient was provided with written information regarding signs of hearing loss.             No follow-ups on file.    Subjective Corinne is a 73 year old, presenting for the following:  Medicare Visit (Annual wellness exam) and Establish Care (With Jemima Saenz MD )          Health Care Directive  Patient does not have a Health Care Directive or  "Living Will: Advance Directive received and scanned. Click on Code in the patient header to view.  Healthy Habits:     In general, how would you rate your overall health?  Good    Frequency of exercise:  4-5 days/week    Duration of exercise:  45-60 minutes    Do you usually eat at least 4 servings of fruit and vegetables a day, include whole grains    & fiber and avoid regularly eating high fat or \"junk\" foods?  Yes    Taking medications regularly:  Yes    Medication side effects:  None    Ability to successfully perform activities of daily living:  No assistance needed    Home Safety:  Throw rugs in the hallway and lack of grab bars in the bathroom    Hearing Impairment:  Find that men's voices are easier to understand than woman's    In the past 6 months, have you been bothered by leaking of urine?  No    In general, how would you rate your overall mental or emotional health?  Good    Additional concerns today:  Yes    Doing well, due for refills       Annual Wellness Visit     Patient has been advised of split billing requirements and indicates understanding: Yes       Health Care Directive  Patient does not have a Health Care Directive or Living Will: Advance Directive received and scanned. Click on Code in the patient header to view.      1/25/2024   General Health   How would you rate your overall physical health? Good          1/25/2024   Nutrition   At least 4 servings of fruits and vegetables/day Yes         1/25/2024   Exercise   Frequency of exercise: 4-5 days/week           1/25/2024   Social Factors   Worry food won't last until get money to buy more No   Food not last or not have enough money for food? No   Do you have housing?  Yes   Are you worried about losing your housing? No   Lack of transportation? No   Unable to get utilities (heat,electricity)? No           1/31/2024   Fall Risk   Fallen 2 or more times in the past year? No   Trouble with walking or balance? No          1/25/2024   Activities " of Daily Living- Home Safety   Needs help with the following daily activites NO assistance is needed   Safety concerns in the home Throw rugs in the hallway    No grab bars in the bathroom          No data to display                  1/25/2024   Hearing Screening   Hearing concerns? Find that men's voices are easier to understand than woman's            No data to display                   No data to display                Social History     Tobacco Use    Smoking status: Never    Smokeless tobacco: Never   Vaping Use    Vaping Use: Never used   Substance Use Topics    Alcohol use: Not Currently     Alcohol/week: 1.0 standard drink of alcohol     Comment:  once weekly she will have a glass of beer or wine at holiday gatherings    Drug use: No         Today's PHQ-2 Score:       1/31/2024     9:00 AM   PHQ-2 ( 1999 Pf[employer])   Q1: Little interest or pleasure in doing things 0   Q2: Feeling down, depressed or hopeless 0   PHQ-2 Score 0   Q1: Little interest or pleasure in doing things Not at all   Q2: Feeling down, depressed or hopeless Not at all   PHQ-2 Score 0            No data to display                 Biennial screen by mutual decision with patient  History of abnormal Pap smear: Last 3 Pap and HPV Results:         The 10-year ASCVD risk score (Michael SHERIFF, et al., 2019) is: 14.3%    Values used to calculate the score:      Age: 73 years      Sex: Female      Is Non- : No      Diabetic: No      Tobacco smoker: No      Systolic Blood Pressure: 136 mmHg      Is BP treated: No      HDL Cholesterol: 90 mg/dL      Total Cholesterol: 217 mg/dL              Reviewed and updated as needed this visit by Provider                        Current providers sharing in care for this patient include:  Patient Care Team:  Jemima Millard MD as PCP - General (Family Medicine)    The following health maintenance items are reviewed in Epic and correct as of today:  Health Maintenance   Topic Date Due     COVID-19 Vaccine (1) Never done    RSV VACCINE (Pregnancy & 60+) (1 - 1-dose 60+ series) Never done    ZOSTER IMMUNIZATION (1 of 2) 09/17/2012    INFLUENZA VACCINE (1) 09/01/2023    MAMMO SCREENING  01/30/2025    MEDICARE ANNUAL WELLNESS VISIT  01/31/2025    FALL RISK ASSESSMENT  01/31/2025    COLORECTAL CANCER SCREENING  12/10/2025    LIPID  01/05/2027    GLUCOSE  01/31/2027    ADVANCE CARE PLANNING  02/02/2028    DTAP/TDAP/TD IMMUNIZATION (2 - Td or Tdap) 11/09/2030    DEXA  02/28/2038    HEPATITIS C SCREENING  Completed    PHQ-2 (once per calendar year)  Completed    Pneumococcal Vaccine: 65+ Years  Completed    IPV IMMUNIZATION  Aged Out    HPV IMMUNIZATION  Aged Out    MENINGITIS IMMUNIZATION  Aged Out    RSV MONOCLONAL ANTIBODY  Aged Out       Appropriate preventive services were discussed with this patient, including applicable screening as appropriate for fall prevention, nutrition, physical activity, Tobacco-use cessation, weight loss and cognition.  Checklist reviewing preventive services available has been given to the patient.           1/31/2024   Mini Cog   Clock Draw Score 2 Normal   3 Item Recall 3 objects recalled   Mini Cog Total Score 5                  1/25/2024   Social Factors   Worry food won't last until get money to buy more No   Food not last or not have enough money for food? No   Do you have housing?  Yes   Are you worried about losing your housing? No   Lack of transportation? No   Unable to get utilities (heat,electricity)? No         1/31/2024   Fall Risk   Fallen 2 or more times in the past year? No   Trouble with walking or balance? No        Today's PHQ-2 Score:       1/31/2024     9:00 AM   PHQ-2 ( 1999 Pfizer)   Q1: Little interest or pleasure in doing things 0   Q2: Feeling down, depressed or hopeless 0   PHQ-2 Score 0   Q1: Little interest or pleasure in doing things Not at all   Q2: Feeling down, depressed or hopeless Not at all   PHQ-2 Score 0           1/25/2024   Substance  Use   Alcohol more than 3/day or more than 7/wk No     Social History     Tobacco Use    Smoking status: Never    Smokeless tobacco: Never   Vaping Use    Vaping Use: Never used   Substance Use Topics    Alcohol use: Not Currently     Alcohol/week: 1.0 standard drink of alcohol     Comment:  once weekly she will have a glass of beer or wine at holiday gatherings    Drug use: No            No data to display                     The 10-year ASCVD risk score (Michael SHERIFF, et al., 2019) is: 14.3%    Values used to calculate the score:      Age: 73 years      Sex: Female      Is Non- : No      Diabetic: No      Tobacco smoker: No      Systolic Blood Pressure: 136 mmHg      Is BP treated: No      HDL Cholesterol: 90 mg/dL      Total Cholesterol: 217 mg/dL            Reviewed and updated as needed this visit by Provider                      Current providers sharing in care for this patient include:  Patient Care Team:  Jemima Millard MD as PCP - General (Family Medicine)    The following health maintenance items are reviewed in Epic and correct as of today:  Health Maintenance   Topic Date Due    COVID-19 Vaccine (1) Never done    RSV VACCINE (Pregnancy & 60+) (1 - 1-dose 60+ series) Never done    ZOSTER IMMUNIZATION (1 of 2) 09/17/2012    INFLUENZA VACCINE (1) 09/01/2023    MAMMO SCREENING  01/30/2025    MEDICARE ANNUAL WELLNESS VISIT  01/31/2025    FALL RISK ASSESSMENT  01/31/2025    COLORECTAL CANCER SCREENING  12/10/2025    LIPID  01/05/2027    GLUCOSE  01/31/2027    ADVANCE CARE PLANNING  02/02/2028    DTAP/TDAP/TD IMMUNIZATION (2 - Td or Tdap) 11/09/2030    DEXA  02/28/2038    HEPATITIS C SCREENING  Completed    PHQ-2 (once per calendar year)  Completed    Pneumococcal Vaccine: 65+ Years  Completed    IPV IMMUNIZATION  Aged Out    HPV IMMUNIZATION  Aged Out    MENINGITIS IMMUNIZATION  Aged Out    RSV MONOCLONAL ANTIBODY  Aged Out     Review of Systems   Constitutional:  Negative for  "chills and fever.   HENT:  Negative for congestion, ear pain, hearing loss and sore throat.    Eyes:  Negative for pain and visual disturbance.   Respiratory:  Negative for cough and shortness of breath.    Cardiovascular:  Negative for chest pain and palpitations.   Gastrointestinal:  Negative for abdominal pain, constipation, diarrhea and nausea.   Genitourinary:  Negative for dysuria, frequency, genital sores, hematuria, pelvic pain, urgency, vaginal bleeding and vaginal discharge.   Musculoskeletal:  Positive for arthralgias and myalgias. Negative for joint swelling.   Skin:  Negative for rash.   Neurological:  Positive for dizziness. Negative for weakness and headaches.   Psychiatric/Behavioral:  The patient is not nervous/anxious.           Objective    Exam  /80 (BP Location: Right arm, Patient Position: Sitting, Cuff Size: Adult Regular)   Pulse 88   Temp 97  F (36.1  C) (Tympanic)   Resp 16   Ht 1.549 m (5' 1\")   Wt 54.9 kg (121 lb 2 oz)   SpO2 94%   BMI 22.89 kg/m     Estimated body mass index is 22.89 kg/m  as calculated from the following:    Height as of this encounter: 1.549 m (5' 1\").    Weight as of this encounter: 54.9 kg (121 lb 2 oz).  Physical Exam  GENERAL: alert and no distress  EYES: Eyes grossly normal to inspection, PERRL and conjunctivae and sclerae normal  HENT: ear canals and TM's normal, nose and mouth without ulcers or lesions  RESP: lungs clear to auscultation - no rales, rhonchi or wheezes  CV: regular rate and rhythm, normal S1 S2, no S3 or S4, no murmur, click or rub, no peripheral edema   ABDOMEN: soft, nontender, no hepatosplenomegaly, no masses and bowel sounds normal  PSYCH: mentation appears normal, affect normal/bright         1/31/2024   Mini Cog   Clock Draw Score 2 Normal   3 Item Recall 3 objects recalled   Mini Cog Total Score 5            Signed Electronically by: Jemima Francis MD  Answers submitted by the patient for this visit:  Annual Preventive " Visit (Submitted on 1/25/2024)  Chief Complaint: Annual Exam:  Blood in stool: No  heartburn: No  peripheral edema: No  mood changes: No  Skin sensation changes: No  tenderness: No  breast mass: No  breast discharge: No

## 2024-01-31 NOTE — PATIENT INSTRUCTIONS
Learning About Activities of Daily Living  What are activities of daily living?     Activities of daily living (ADLs) are the basic self-care tasks you do every day. As you age, and if you have health problems, you may find that it's harder to do these things for yourself. That's when you may need some help.  Your doctor uses ADLs to measure how much help you need. Knowing what you can and can't do for yourself is an important first step to getting help. And when you have the help you need, you can stay as independent as possible.  Your doctor will want to know if you are able to do tasks such as:  Take a bath or shower without help.  Go to the bathroom by yourself.  Dress and undress without help.  Shave, comb your hair, and brush teeth on your own.  Get in and out of bed or a chair without help.  Feed yourself without help.  If you are having trouble doing basic self-care tasks, talk with your doctor. You may want to bring a caregiver or family member who can help the doctor understand your needs and abilities.  How will a doctor assess your ADLs?  Asking about ADLs is part of a routine health checkup your doctor will likely do as you age. Your health check might be done in a doctor's office, in your home, or at a hospital. The goal is to find out if you are having any problems that could make your health problems worse or that make it unsafe for you to be on your own.  To measure your ADLs, your doctor will ask how hard it is for you to do routine tasks. He or she may also want to know if you have changed the way you do a task because of a health problem. He or she may watch how you:  Walk back and forth.  Keep your balance while you stand or walk.  Move from sitting to standing or from a bed to a chair.  Button or unbutton a shirt or sweater.  Remove and put on your shoes.  It's normal to feel a little worried or anxious if you find you can't do all the things you used to be able to do. Talking with your doctor  about ADLs isn't a test that you either pass or fail. It's just a way to get more information about your health and safety.  Follow-up care is a key part of your treatment and safety. Be sure to make and go to all appointments, and call your doctor if you are having problems. It's also a good idea to know your test results and keep a list of the medicines you take.  Current as of: February 26, 2023               Content Version: 13.8    6788-0943 [employer].   Care instructions adapted under license by your healthcare professional. If you have questions about a medical condition or this instruction, always ask your healthcare professional. [employer] disclaims any warranty or liability for your use of this information.      Hearing Loss: Care Instructions  Overview     Hearing loss is a sudden or slow decrease in how well you hear. It can range from slight to profound. Permanent hearing loss can occur with aging. It also can happen when you are exposed long-term to loud noise. Examples include listening to loud music, riding motorcycles, or being around other loud machines.  Hearing loss can affect your work and home life. It can make you feel lonely or depressed. You may feel that you have lost your independence. But hearing aids and other devices can help you hear better and feel connected to others.  Follow-up care is a key part of your treatment and safety. Be sure to make and go to all appointments, and call your doctor if you are having problems. It's also a good idea to know your test results and keep a list of the medicines you take.  How can you care for yourself at home?  Avoid loud noises whenever possible. This helps keep your hearing from getting worse.  Always wear hearing protection around loud noises.  Wear a hearing aid as directed.  A professional can help you pick a hearing aid that will work best for you.  You can also get hearing aids over the counter for mild to  "moderate hearing loss.  Have hearing tests as your doctor suggests. They can show whether your hearing has changed. Your hearing aid may need to be adjusted.  Use other devices as needed. These may include:  Telephone amplifiers and hearing aids that can connect to a television, stereo, radio, or microphone.  Devices that use lights or vibrations. These alert you to the doorbell, a ringing telephone, or a baby monitor.  Television closed-captioning. This shows the words at the bottom of the screen. Most new TVs can do this.  TTY (text telephone). This lets you type messages back and forth on the telephone instead of talking or listening. These devices are also called TDD. When messages are typed on the keyboard, they are sent over the phone line to a receiving TTY. The message is shown on a monitor.  Use text messaging, social media, and email if it is hard for you to communicate by telephone.  Try to learn a listening technique called speechreading. It is not lipreading. You pay attention to people's gestures, expressions, posture, and tone of voice. These clues can help you understand what a person is saying. Face the person you are talking to, and have them face you. Make sure the lighting is good. You need to see the other person's face clearly.  Think about counseling if you need help to adjust to your hearing loss.  When should you call for help?  Watch closely for changes in your health, and be sure to contact your doctor if:    You think your hearing is getting worse.     You have new symptoms, such as dizziness or nausea.   Where can you learn more?  Go to https://www.[employer].net/patiented  Enter R798 in the search box to learn more about \"Hearing Loss: Care Instructions.\"  Current as of: February 28, 2023               Content Version: 13.8    4098-7234 [employer].   Care instructions adapted under license by your healthcare professional. If you have questions about a medical condition or " this instruction, always ask your healthcare professional. [employer], [employer] disclaims any warranty or liability for your use of this information.      Preventive Care Advice   This is general advice given by our system to help you stay healthy. However, your care team may have specific advice just for you. Please talk to your care team about your preventive care needs.  Nutrition  Eat 5 or more servings of fruits and vegetables each day.  Try wheat bread, brown rice and whole grain pasta (instead of white bread, rice, and pasta).  Get enough calcium and vitamin D. Check the label on foods and aim for 100% of the RDA (recommended daily allowance).  Lifestyle  Exercise at least 150 minutes each week  (30 minutes a day, 5 days a week).  Do muscle strengthening activities 2 days a week. These help control your weight and prevent disease.  No smoking.  Wear sunscreen to prevent skin cancer.  Have a dental exam and cleaning every 6 months.  Yearly exams  See your health care team every year to talk about:  Any changes in your health.  Any medicines your care team has prescribed.  Preventive care, family planning, and ways to prevent chronic diseases.  Shots (vaccines)   HPV shots (up to age 26), if you've never had them before.  Hepatitis B shots (up to age 59), if you've never had them before.  COVID-19 shot: Get this shot when it's due.  Flu shot: Get a flu shot every year.  Tetanus shot: Get a tetanus shot every 10 years.  Pneumococcal, hepatitis A, and RSV shots: Ask your care team if you need these based on your risk.  Shingles shot (for age 50 and up)  General health tests  Diabetes screening:  Starting at age 35, Get screened for diabetes at least every 3 years.  If you are younger than age 35, ask your care team if you should be screened for diabetes.  Cholesterol test: At age 39, start having a cholesterol test every 5 years, or more often if advised.  Bone density scan (DEXA): At age 50, ask your care  team if you should have this scan for osteoporosis (brittle bones).  Hepatitis C: Get tested at least once in your life.  STIs (sexually transmitted infections)  Before age 24: Ask your care team if you should be screened for STIs.  After age 24: Get screened for STIs if you're at risk. You are at risk for STIs (including HIV) if:  You are sexually active with more than one person.  You don't use condoms every time.  You or a partner was diagnosed with a sexually transmitted infection.  If you are at risk for HIV, ask about PrEP medicine to prevent HIV.  Get tested for HIV at least once in your life, whether you are at risk for HIV or not.  Cancer screening tests  Cervical cancer screening: If you have a cervix, begin getting regular cervical cancer screening tests starting at age 21.  Breast cancer scan (mammogram): If you've ever had breasts, begin having regular mammograms starting at age 40. This is a scan to check for breast cancer.  Colon cancer screening: It is important to start screening for colon cancer at age 45.  Have a colonoscopy test every 10 years (or more often if you're at risk) Or, ask your provider about stool tests like a FIT test every year or Cologuard test every 3 years.  To learn more about your testing options, visit:   https://www.[employer]/612104.pdf.  For help making a decision, visit:   https://bit.ly/xa99664.  Prostate cancer screening test: If you have a prostate, ask your care team if a prostate cancer screening test (PSA) at age 55 is right for you.  Lung cancer screening: If you are a current or former smoker ages 50 to 80, ask your care team if ongoing lung cancer screenings are right for you.  For informational purposes only. Not to replace the advice of your health care provider. Copyright   2023 [employer]. All rights reserved. Clinically reviewed by the [facility] Transitions Program. [employer] [id number] - REV 01/24.

## 2024-02-07 ENCOUNTER — DOCUMENTATION ONLY (OUTPATIENT)
Dept: OTHER | Facility: CLINIC | Age: 74
End: 2024-02-07
Payer: COMMERCIAL

## 2024-02-23 NOTE — NURSING NOTE
Detail Level: Zone Patient Information     Patient Name MRN Sex DOB Hultman, Corinne G [id number] Female 1950      Nursing Note by Judy Piña at 10/12/2017  1:30 PM     Author:  Judy Piña Service:  (none) Author Type:  (none)     Filed:  10/12/2017  1:44 PM Encounter Date:  10/12/2017 Status:  Signed     :  Judy Piña            Patient here for yealry physical, needs refill on Celebrex, ammm done 10/11/2017, last eye and dental exam 2017 . Judy Piña LPN .......................10/12/2017  1:38 PM

## 2024-03-08 NOTE — NURSING NOTE
Faxed over Dr. Dominguez's [employer] appeal letter to [facility]   (Fax # [phone])    Re-faxed on 3/4/24 to state that appeal needs to be an expedited review   Patient is here for a follow up on her left humerus fracture.    DOI: 12/29/17  Alesha Lo LPN .......4/27/2018 8:05 AM

## 2024-03-31 DIAGNOSIS — M50.10 CERVICAL RADICULOPATHY DUE TO INTERVERTEBRAL DISC DISORDER: ICD-10-CM

## 2024-03-31 DIAGNOSIS — M54.16 LEFT LUMBAR RADICULOPATHY: ICD-10-CM

## 2024-04-03 RX ORDER — CELECOXIB 200 MG/1
200 CAPSULE ORAL DAILY
Qty: 90 CAPSULE | Refills: 4 | OUTPATIENT
Start: 2024-04-03

## 2024-04-03 NOTE — TELEPHONE ENCOUNTER
[facility] sent Rx request for the following:      Requested Prescriptions   Pending Prescriptions Disp Refills    celecoxib (CELEBREX) 200 MG capsule [Pharmacy Med Name: CELECOXIB 200MG CAPSULES] 90 capsule 4     Sig: TAKE 1 CAPSULE(200 MG) BY MOUTH DAILY       NSAID Medications Failed - 3/31/2024  3:56 AM        Failed - Patient is age 6-64 years        Failed - Normal CBC on file in past 12 months     Recent Labs   Lab Test 01/31/24  0926 07/11/18  0909 11/26/17  0511   WBC 7.4   < >  --    GICHWBC  --   --  5.6   RBC 3.73*   < >  --    GICHRBC  --   --  3.60*   HGB 12.1   < > 11.9*   HCT 35.4   < > 34.6      < > 205    < > = values in this interval not displayed.                 Failed - Always Fail Criteria - Chart Review Required     Validate Diagnosis. If the medication is requested for an acute flare of a chronic pain associated with a musculoskeletal or rheumatologic condition; okay to authorize if all other criteria are met. If not, then forward to provider for review.         Last Prescription Date:   01/31/24  Last Fill Qty/Refills:         90, R-4    Last Office Visit:              1/31/24   Future Office visit:           none     Refused: Should already have refills on file.     celecoxib (CELEBREX) 200 MG capsule 90 capsule 4 1/31/2024 -- No   Sig - Route: Take 1 capsule (200 mg) by mouth daily - Oral     Unable to complete prescription refill per RN Medication Refill Policy.   Sulema Eastno RN on 4/3/2024 at 2:46 PM

## 2024-05-14 DIAGNOSIS — M54.16 LEFT LUMBAR RADICULOPATHY: ICD-10-CM

## 2024-05-14 DIAGNOSIS — M50.10 CERVICAL RADICULOPATHY DUE TO INTERVERTEBRAL DISC DISORDER: ICD-10-CM

## 2024-05-17 RX ORDER — ACETAMINOPHEN AND CODEINE PHOSPHATE 300; 30 MG/1; MG/1
2 TABLET ORAL EVERY 6 HOURS PRN
Qty: 60 TABLET | Refills: 0 | Status: SHIPPED | OUTPATIENT
Start: 2024-05-17 | End: 2024-09-13

## 2024-05-17 NOTE — TELEPHONE ENCOUNTER
[facility] sent Rx request for the following:      Requested Prescriptions   Pending Prescriptions Disp Refills    acetaminophen-codeine (TYLENOL #3) 300-30 MG per tablet [Pharmacy Med Name: ACETAMINOPHEN/COD #3 (300/30MG) TAB] 720 tablet      Sig: TAKE 1-2 TABLETS BY MOUTH EVERY 6 HOURS AS NEEDED FOR SEVERE PAIN(BREAKTHROUGH SEVERE NECK PAIN)       There is no refill protocol information for this order          Last Prescription Date:   1/31/24  Last Fill Qty/Refills:         150, R-1    Last Office Visit:              1/31/24   Future Office visit:           None scheduled    Routing to covering provider to consider as PCP is out of office.    Jade Mckeon RN on 5/17/2024 at 10:36 AM

## 2024-05-30 ENCOUNTER — OFFICE VISIT (OUTPATIENT)
Dept: FAMILY MEDICINE | Facility: OTHER | Age: 74
End: 2024-05-30
Payer: MEDICARE

## 2024-05-30 VITALS
BODY MASS INDEX: 23.15 KG/M2 | OXYGEN SATURATION: 95 % | WEIGHT: 122.6 LBS | TEMPERATURE: 98.2 F | HEIGHT: 61 IN | HEART RATE: 83 BPM | DIASTOLIC BLOOD PRESSURE: 80 MMHG | SYSTOLIC BLOOD PRESSURE: 132 MMHG
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
## 2024-05-30 DIAGNOSIS — H61.21 IMPACTED CERUMEN OF RIGHT EAR: ICD-10-CM

## 2024-05-30 DIAGNOSIS — S80.861A TICK BITE OF RIGHT LOWER LEG, INITIAL ENCOUNTER: Primary | ICD-10-CM

## 2024-05-30 DIAGNOSIS — A69.20 ERYTHEMA MIGRANS (LYME DISEASE): ICD-10-CM

## 2024-05-30 LAB
ALBUMIN SERPL BCG-MCNC: 5.2 G/DL (ref 3.5–5.2)
ALP SERPL-CCNC: 58 U/L (ref 40–150)
ALT SERPL W P-5'-P-CCNC: 26 U/L (ref 0–50)
ANION GAP SERPL CALCULATED.3IONS-SCNC: 9 MMOL/L (ref 7–15)
AST SERPL W P-5'-P-CCNC: 32 U/L (ref 0–45)
BASOPHILS # BLD AUTO: 0 10E3/UL (ref 0–0.2)
BASOPHILS NFR BLD AUTO: 0 %
BILIRUB SERPL-MCNC: 0.5 MG/DL
BUN SERPL-MCNC: 14.9 MG/DL (ref 8–23)
CALCIUM SERPL-MCNC: 11 MG/DL (ref 8.8–10.2)
CHLORIDE SERPL-SCNC: 98 MMOL/L (ref 98–107)
CREAT SERPL-MCNC: 0.67 MG/DL (ref 0.51–0.95)
DEPRECATED HCO3 PLAS-SCNC: 30 MMOL/L (ref 22–29)
EGFRCR SERPLBLD CKD-EPI 2021: >90 ML/MIN/1.73M2
EOSINOPHIL # BLD AUTO: 0.3 10E3/UL (ref 0–0.7)
EOSINOPHIL NFR BLD AUTO: 6 %
ERYTHROCYTE [DISTWIDTH] IN BLOOD BY AUTOMATED COUNT: 13.1 % (ref 10–15)
GLUCOSE SERPL-MCNC: 95 MG/DL (ref 70–99)
HCT VFR BLD AUTO: 38 % (ref 35–47)
HGB BLD-MCNC: 12.7 G/DL (ref 11.7–15.7)
IMM GRANULOCYTES # BLD: 0 10E3/UL
IMM GRANULOCYTES NFR BLD: 0 %
LYMPHOCYTES # BLD AUTO: 1.8 10E3/UL (ref 0.8–5.3)
LYMPHOCYTES NFR BLD AUTO: 38 %
MCH RBC QN AUTO: 32.2 PG (ref 26.5–33)
MCHC RBC AUTO-ENTMCNC: 33.4 G/DL (ref 31.5–36.5)
MCV RBC AUTO: 96 FL (ref 78–100)
MONOCYTES # BLD AUTO: 0.5 10E3/UL (ref 0–1.3)
MONOCYTES NFR BLD AUTO: 11 %
NEUTROPHILS # BLD AUTO: 2.1 10E3/UL (ref 1.6–8.3)
NEUTROPHILS NFR BLD AUTO: 45 %
NRBC # BLD AUTO: 0 10E3/UL
NRBC BLD AUTO-RTO: 0 /100
PLATELET # BLD AUTO: 181 10E3/UL (ref 150–450)
POTASSIUM SERPL-SCNC: 4.3 MMOL/L (ref 3.4–5.3)
PROT SERPL-MCNC: 7.8 G/DL (ref 6.4–8.3)
RBC # BLD AUTO: 3.95 10E6/UL (ref 3.8–5.2)
SODIUM SERPL-SCNC: 137 MMOL/L (ref 135–145)
WBC # BLD AUTO: 4.8 10E3/UL (ref 4–11)

## 2024-05-30 PROCEDURE — 87469 BABESIA MICROTI AMP PRB: CPT | Mod: ZL

## 2024-05-30 PROCEDURE — 86618 LYME DISEASE ANTIBODY: CPT | Mod: ZL

## 2024-05-30 PROCEDURE — G0463 HOSPITAL OUTPT CLINIC VISIT: HCPCS | Mod: 25

## 2024-05-30 PROCEDURE — 36415 COLL VENOUS BLD VENIPUNCTURE: CPT | Mod: ZL

## 2024-05-30 PROCEDURE — 99213 OFFICE O/P EST LOW 20 MIN: CPT

## 2024-05-30 PROCEDURE — 85025 COMPLETE CBC W/AUTO DIFF WBC: CPT | Mod: ZL

## 2024-05-30 PROCEDURE — 69209 REMOVE IMPACTED EAR WAX UNI: CPT

## 2024-05-30 PROCEDURE — 87484 EHRLICHA CHAFFEENSIS AMP PRB: CPT | Mod: ZL

## 2024-05-30 PROCEDURE — 82040 ASSAY OF SERUM ALBUMIN: CPT | Mod: ZL

## 2024-05-30 RX ORDER — DOXYCYCLINE 100 MG/1
100 CAPSULE ORAL 2 TIMES DAILY
Qty: 20 CAPSULE | Refills: 0 | Status: SHIPPED | OUTPATIENT
Start: 2024-05-30 | End: 2024-06-09

## 2024-05-30 ASSESSMENT — PAIN SCALES - GENERAL: PAINLEVEL: NO PAIN (0)

## 2024-05-30 NOTE — NURSING NOTE
Patient identified using two patient identifiers.  Ear exam showing wax occlusion completed by provider.  Solution: warm water was placed in the right ear(s) via  syringe . Ekta Guido CMA ([facility])

## 2024-05-30 NOTE — PROGRESS NOTES
ASSESSMENT/PLAN:    I have reviewed the nursing notes.  I have reviewed the findings, diagnosis, plan and need for follow up with the patient.    1. Tick bite of right lower leg, initial encounter  2. Erythema migrans (Lyme disease)  - doxycycline hyclate (VIBRAMYCIN) 100 MG capsule; Take 1 capsule (100 mg) by mouth 2 times daily for 10 days  Dispense: 20 capsule; Refill: 0  - Ehrlichia Anaplasma Sp by PCR  - Lyme Disease Total Abs Bld with Reflex to Confirm CLIA  - Babesia Species by PCR  - CBC and Differential  - Comprehensive Metabolic Panel    Patient presents with a tick bite of her right posterior lower leg that occurred 2 weeks ago.  3 days ago patient noticed a bull's-eye-like rash around the tick bite.  Discussed with patient that the rash is similar to erythema migrans therefore we will treat her for possible Lyme's disease with doxycycline.  Advised patient not to eat or drink any dairy within an hour or 2 of taking this medication and to either hold her calcium or take her calcium in between her doxycycline doses.  Advised her to keep the tick bite area clean and dry.  Her CBC and CMP are stable.  Tickborne illness labs are pending and patient will be notified of the results.    3. Impacted cerumen of right ear  - SC REMOVAL IMPACTED CERUMEN IRRIGATION/LVG UNILAT    Patient has impacted cerumen of the right ear.  This was successfully removed by nursing staff via ear lavage.  No signs of infection noted.    Discussed warning signs/symptoms indicative of need to f/u    Follow up if symptoms persist or worsen or concerns    I explained my diagnostic considerations and recommendations to the patient, who voiced understanding and agreement with the treatment plan. All questions were answered. We discussed potential side effects of any prescribed or recommended therapies, as well as expectations for response to treatments.    Zeb Ohara, JOHN CNP  5/30/2024  12:28 PM    HPI:    Corinne G Hultman is a 74  year old female who [facility] for concerns of tick bite    Patient states that she had a tick bite on her right posterior lower leg two weeks ago. She removed the tick which she believes was a wood tick and it was embedded. She is unsure of how long it was attached. She cleaned the bite area with soap and water. She states that three days ago she noticed a bullseye rash around the tick bite. She denies any other signs of infection or flu-like symptoms. She denies any prior tick born illnesses.     Patient also has impacted cerumen in her right ear.  She has been using Debrox drops.  She denies any ear pain or drainage.  She states that she does have decreased hearing in that ear due to the cerumen impaction.    Past Medical History:   Diagnosis Date    Actinic keratosis     2/22/2013    Age-related osteoporosis without current pathological fracture     on Fosamax for 17 years--stopped 2011    Asymptomatic menopausal state     age 50    Closed displaced fracture of lateral malleolus of fibula     5/17/2010    Disorder of cartilage     No Comments Provided    Displaced fracture of lateral malleolus of unspecified fibula, initial encounter for open fracture type I or II     No Comments Provided    Dizziness and giddiness     No Comments Provided    Fracture of upper end of left humerus with routine healing     1/4/2018    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) 8/15/2016    Other cervical disc degeneration, unspecified cervical region     No Comments Provided    Other specified types of non-hodgkin lymphoma, extranodal and solid organ sites (H)     2011    Personal history of other malignant neoplasm of skin (CODE)     3/28/2017    Rheumatic fever without heart involvement     10+ years ago    Tension-type headache, not intractable     No Comments Provided     Past Surgical History:   Procedure Laterality Date    BIOPSY BREAST      mid 1990s,benign    CATARACT EXTRACTION  09/2022     "COLONOSCOPY      age 55,normal 01/01/2005    COLONOSCOPY  12/10/2015    12/10/2015,lnormal--repeat 10 years, 12/10/2025    FUSION CERVICAL ANTERIOR ONE LEVEL  12/2012    C5-6 on left with discectomy    LAMINECTOMY LUMBAR ONE LEVEL      1984,L4-5    LAPAROSCOPIC TUBAL LIGATION      FOR ECTOPIC PREGNANCY    OTHER SURGICAL HISTORY      ,[id number],SKIN BIOPSY,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    OTHER SURGICAL HISTORY      1974,21068.0,[place] ECTOP PREG BY LAPAROSCOPE,Left,left tube and ovary removed    TONSILLECTOMY      1959     Social History     Tobacco Use    Smoking status: Never    Smokeless tobacco: Never   Substance Use Topics    Alcohol use: Not Currently     Alcohol/week: 1.0 standard drink of alcohol     Comment:  once weekly she will have a glass of beer or wine at holiday gatherings     Current Outpatient Medications   Medication Sig Dispense Refill    acetaminophen-codeine (TYLENOL #3) 300-30 MG per tablet Take 2 tablets by mouth every 6 hours as needed for severe pain 60 tablet 0    calcium carbonate (OS-CAROL) 600 MG tablet Take by mouth 2 times daily      celecoxib (CELEBREX) 200 MG capsule Take 1 capsule (200 mg) by mouth daily 90 capsule 4    cholecalciferol 50 MCG (2000 UT) tablet Take 2,000 Units by mouth daily      diphenhydrAMINE (BENADRYL) 25 MG tablet Take 1 tablet (25 mg) by mouth every 6 hours as needed for itching or allergies       No Known Allergies  Past medical history, past surgical history, current medications and allergies reviewed and accurate to the best of my knowledge.      ROS:  Refer to HPI    /80   Pulse 83   Temp 98.2  F (36.8  C) (Tympanic)   Ht 1.549 m (5' 1\")   Wt 55.6 kg (122 lb 9.6 oz)   SpO2 95%   BMI 23.17 kg/m      EXAM:  General Appearance: Well appearing 74 year old female, appropriate appearance for age. No acute distress   Ears: Left TM intact, translucent with bony landmarks appreciated, no erythema, no effusion, no bulging, no purulence.  " Right TM intact, translucent with bony landmarks appreciated, no erythema, no effusion, no bulging, no purulence.  Left auditory canal clear.  Right auditory canal with cerumen impaction.  Normal external ears, non tender.  Neck: supple without adenopathy  Respiratory: normal chest wall and respirations.  Normal effort. No increased work of breathing.  No cough appreciated.  Cardiac: RRR with no murmurs  Musculoskeletal:  Equal movement of bilateral upper extremities.  Equal movement of bilateral lower extremities.  Normal gait.    Dermatological: 2 cm x 3 cm erythema migrans type rash around a tick bite of her right posterior lower leg, no drainage noted  Neuro: Alert and oriented to person, place, and time.    Psychological: normal affect, alert, oriented, and pleasant.     Labs:  Results for orders placed or performed in visit on 05/30/24   Comprehensive Metabolic Panel     Status: Abnormal   Result Value Ref Range    Sodium 137 135 - 145 mmol/L    Potassium 4.3 3.4 - 5.3 mmol/L    Carbon Dioxide (CO2) 30 (H) 22 - 29 mmol/L    Anion Gap 9 7 - 15 mmol/L    Urea Nitrogen 14.9 8.0 - 23.0 mg/dL    Creatinine 0.67 0.51 - 0.95 mg/dL    GFR Estimate >90 >60 mL/min/1.73m2    Calcium 11.0 (H) 8.8 - 10.2 mg/dL    Chloride 98 98 - 107 mmol/L    Glucose 95 70 - 99 mg/dL    Alkaline Phosphatase 58 40 - 150 U/L    AST 32 0 - 45 U/L    ALT 26 0 - 50 U/L    Protein Total 7.8 6.4 - 8.3 g/dL    Albumin 5.2 3.5 - 5.2 g/dL    Bilirubin Total 0.5 <=1.2 mg/dL   CBC with platelets and differential     Status: None   Result Value Ref Range    WBC Count 4.8 4.0 - 11.0 10e3/uL    RBC Count 3.95 3.80 - 5.20 10e6/uL    Hemoglobin 12.7 11.7 - 15.7 g/dL    Hematocrit 38.0 35.0 - 47.0 %    MCV 96 78 - 100 fL    MCH 32.2 26.5 - 33.0 pg    MCHC 33.4 31.5 - 36.5 g/dL    RDW 13.1 10.0 - 15.0 %    Platelet Count 181 150 - 450 10e3/uL    % Neutrophils 45 %    % Lymphocytes 38 %    % Monocytes 11 %    % Eosinophils 6 %    % Basophils 0 %    %  Immature Granulocytes 0 %    NRBCs per 100 WBC 0 <1 /100    Absolute Neutrophils 2.1 1.6 - 8.3 10e3/uL    Absolute Lymphocytes 1.8 0.8 - 5.3 10e3/uL    Absolute Monocytes 0.5 0.0 - 1.3 10e3/uL    Absolute Eosinophils 0.3 0.0 - 0.7 10e3/uL    Absolute Basophils 0.0 0.0 - 0.2 10e3/uL    Absolute Immature Granulocytes 0.0 <=0.4 10e3/uL    Absolute NRBCs 0.0 10e3/uL   CBC and Differential     Status: None    Narrative    The following orders were created for panel order CBC and Differential.  Procedure                               Abnormality         Status                     ---------                               -----------         ------                     CBC with platelets and d...[[id number]]                      Final result                 Please view results for these tests on the individual orders.

## 2024-05-30 NOTE — NURSING NOTE
"Chief Complaint   Patient presents with    Tick Bite     Right calf     Patient here for a tick bite on right calf x2 weeks. She states that on Sunday a red bullseye appeared. It is slightly itch. Denies other symptoms.     Initial /80   Pulse 83   Temp 98.2  F (36.8  C) (Tympanic)   Ht 1.549 m (5' 1\")   Wt 55.6 kg (122 lb 9.6 oz)   SpO2 95%   BMI 23.17 kg/m   Estimated body mass index is 23.17 kg/m  as calculated from the following:    Height as of this encounter: 1.549 m (5' 1\").    Weight as of this encounter: 55.6 kg (122 lb 9.6 oz).  Medication Review: complete    The next two questions are to help us understand your food security.  If you are feeling you need any assistance in this area, we have resources available to support you today.          5/30/2024   SDOH- Food Insecurity   Within the past 12 months, did you worry that your food would run out before you got money to buy more? N   Within the past 12 months, did the food you bought just not last and you didn t have money to get more? N         Health Care Directive:  Patient has a Health Care Directive on file      Hermila Gagnon LPN      "

## 2024-05-31 LAB — B BURGDOR IGG+IGM SER QL: 0.13

## 2024-06-02 LAB
A PHAGOCYTOPH DNA BLD QL NAA+PROBE: NOT DETECTED
B MICROTI DNA BLD QL NAA+PROBE: NOT DETECTED
BABESIA DNA BLD QL NAA+PROBE: NOT DETECTED
E CHAFFEENSIS DNA BLD QL NAA+PROBE: NOT DETECTED
E EWINGII DNA SPEC QL NAA+PROBE: NOT DETECTED
EHRLICHIA DNA SPEC QL NAA+PROBE: NOT DETECTED

## 2024-06-26 ENCOUNTER — OFFICE VISIT (OUTPATIENT)
Dept: FAMILY MEDICINE | Facility: OTHER | Age: 74
End: 2024-06-26
Attending: NURSE PRACTITIONER
Payer: COMMERCIAL

## 2024-06-26 VITALS
BODY MASS INDEX: 22.84 KG/M2 | SYSTOLIC BLOOD PRESSURE: 140 MMHG | TEMPERATURE: 97.7 F | OXYGEN SATURATION: 97 % | HEIGHT: 61 IN | HEART RATE: 77 BPM | WEIGHT: 121 LBS | DIASTOLIC BLOOD PRESSURE: 90 MMHG | RESPIRATION RATE: 14 BRPM

## 2024-06-26 DIAGNOSIS — Z01.818 PREOP GENERAL PHYSICAL EXAM: ICD-10-CM

## 2024-06-26 DIAGNOSIS — H53.453 PERIPHERAL VISION LOSS, BILATERAL: Primary | ICD-10-CM

## 2024-06-26 DIAGNOSIS — E78.5 HYPERLIPIDEMIA, UNSPECIFIED HYPERLIPIDEMIA TYPE: ICD-10-CM

## 2024-06-26 PROCEDURE — G0463 HOSPITAL OUTPT CLINIC VISIT: HCPCS

## 2024-06-26 PROCEDURE — 99214 OFFICE O/P EST MOD 30 MIN: CPT | Performed by: NURSE PRACTITIONER

## 2024-06-26 PROCEDURE — 93005 ELECTROCARDIOGRAM TRACING: CPT | Performed by: NURSE PRACTITIONER

## 2024-06-26 PROCEDURE — 93010 ELECTROCARDIOGRAM REPORT: CPT | Performed by: INTERNAL MEDICINE

## 2024-06-26 ASSESSMENT — PAIN SCALES - GENERAL: PAINLEVEL: NO PAIN (0)

## 2024-06-26 NOTE — PATIENT INSTRUCTIONS

## 2024-06-26 NOTE — NURSING NOTE
"Chief Complaint   Patient presents with    Pre-Op Exam    Follow Up     Tick bite; [facility]; treated with Doxycycline; still having pain in right knee        Initial BP (!) 144/96   Pulse 77   Temp 97.7  F (36.5  C) (Tympanic)   Resp 14   Ht 1.549 m (5' 1\")   Wt 54.9 kg (121 lb)   SpO2 97%   Breastfeeding No   BMI 22.86 kg/m   Estimated body mass index is 22.86 kg/m  as calculated from the following:    Height as of this encounter: 1.549 m (5' 1\").    Weight as of this encounter: 54.9 kg (121 lb).  Medication Review: complete    The next two questions are to help us understand your food security.  If you are feeling you need any assistance in this area, we have resources available to support you today.          5/30/2024   SDOH- Food Insecurity   Within the past 12 months, did you worry that your food would run out before you got money to buy more? N   Within the past 12 months, did the food you bought just not last and you didn t have money to get more? N            Health Care Directive:  Patient has a Health Care Directive on file      Simi Mar CMA      "

## 2024-06-26 NOTE — PROGRESS NOTES
Preoperative Evaluation  [facility]  [address]  [place] [address]  Phone: [phone]  Fax: [phone]  Primary Provider: Jemima Francis MD  Pre-op Performing Provider: Simi Altman NP  Jun 26, 2024 6/24/2024   Surgical Information   What procedure is being done? Brow lift and blepharoplasty   Facility or Hospital where procedure/surgery will be performed: [facility]   Who is doing the procedure / surgery? Dr. Young   Date of surgery / procedure: 07-   Time of surgery / procedure: TBD   Where do you plan to recover after surgery? at home with family        Fax number for surgical facility: [phone]    Assessment & Plan     The proposed surgical procedure is considered INTERMEDIATE risk.    Problem List Items Addressed This Visit       Hyperlipidemia     Other Visit Diagnoses       Peripheral vision loss, bilateral    -  Primary    Relevant Orders    EKG 12-lead complete w/read - Clinics (Completed)    Preop general physical exam        Relevant Orders    EKG 12-lead complete w/read - Clinics (Completed)            1. Peripheral vision loss, bilateral  2. Preop general physical exam  - EKG 12-lead complete w/read - Clinics  Normal sinus rhythm   Labs drawn 1 month ago showed stable hemoglobin levels and normal renal function, thus did not re-draw today.   Peripheral vision loss is related to eyelid drooping and reason for upcoming blepharoplasty and brow lift. No contraindications to upcoming surgical procedure based on today's exam findings.     3. Hyperlipidemia, unspecified hyperlipidemia type  Stable; not on statin medication. No prior cardiac history or symptoms. .           - No identified additional risk factors other than previously addressed    Antiplatelet or Anticoagulation Medication Instructions   - Patient is on no antiplatelet or anticoagulation medications.    Additional Medication Instructions   - Herbal  medications and vitamins: DO NOT TAKE 14 days prior to surgery.   - celecoxib (Celebrex): DO NOT TAKE 3 days before surgery. May continue without modification for management of severe pain.     Recommendation  Approval given to proceed with proposed procedure, without further diagnostic evaluation.    Subjective Corinne is a 74 year old, presenting for the following:  Pre-Op Exam and Follow Up (Tick bite; [facility] in [facility] 5/30/24; treated with Doxycycline; still having pain in right knee )          6/26/2024    10:45 AM   Additional Questions   Roomed by DOMINIC Belcher   Accompanied by Self     HPI related to upcoming procedure: she has lost 30% of her peripheral vision as a result of drooping eyelids and brows. She will be having a scheduled procedure to treat this.     She feels well. No recent cough, shortness of breath, or febrile illness. No cardiac symptoms, chest pain, or palpitations. No prior anesthesia concerns or reactions.         6/24/2024   Pre-Op Questionnaire   Have you ever had a heart attack or stroke? No   Have you ever had surgery on your heart or blood vessels, such as a stent placement, a coronary artery bypass, or surgery on an artery in your head, neck, heart, or legs? No   Do you have chest pain with activity? No   Do you have a history of heart failure? No   Do you currently have a cold, bronchitis or symptoms of other infection? No   Do you have a cough, shortness of breath, or wheezing? No   Do you or anyone in your family have previous history of blood clots? No   Do you or does anyone in your family have a serious bleeding problem such as prolonged bleeding following surgeries or cuts? No   Have you ever had problems with anemia or been told to take iron pills? (!) YES - just following child birth. No other concerns of anemia or iron deficiency. It has been many years.    Have you had any abnormal blood loss such as black, tarry or bloody stools, or abnormal vaginal bleeding? No    Have you ever had a blood transfusion? No   Are you willing to have a blood transfusion if it is medically needed before, during, or after your surgery? Yes   Have you or any of your relatives ever had problems with anesthesia? No   Do you have sleep apnea, excessive snoring or daytime drowsiness? No   Do you have any artifical heart valves or other implanted medical devices like a pacemaker, defibrillator, or continuous glucose monitor? No   Do you have artificial joints? No   Are you allergic to latex? No        Health Care Directive  Patient has a Health Care Directive on file      Preoperative Review of    reviewed - controlled substances reflected in medication list.          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 01/11/2022     Priority: Medium    Multiple actinic keratoses 01/11/2022     Priority: Medium    Age-related osteoporosis without current pathological fracture 03/10/2021     Priority: Medium    History of chronic dermatitis 01/09/2020     Priority: Medium    Skin lesion of chest wall 04/27/2018     Priority: Medium    Tension headache 01/26/2018     Priority: Medium    Disorder of bone and cartilage 01/26/2018     Priority: Medium    Closed fracture of proximal end of left humerus with routine healing 01/04/2018     Priority: Medium    Hyperlipidemia 09/17/2015     Priority: Medium    Right cervical radiculopathy 07/23/2012     Priority: Medium    History of follicular lymphoma 01/03/2011     Priority: Medium      Past Medical History:   Diagnosis Date    Actinic keratosis     2/22/2013    Age-related osteoporosis without current pathological fracture     on Fosamax for 17 years--stopped 2011    Asymptomatic menopausal state     age 50    Closed displaced fracture of lateral malleolus of fibula     5/17/2010    Disorder of cartilage     No Comments Provided    Displaced fracture of lateral malleolus of unspecified  fibula, initial encounter for open fracture type I or II     No Comments Provided    Dizziness and giddiness     No Comments Provided    Fracture of upper end of left humerus with routine healing     1/4/2018    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) 8/15/2016    Other cervical disc degeneration, unspecified cervical region     No Comments Provided    Other specified types of non-hodgkin lymphoma, extranodal and solid organ sites (H)     2011    Personal history of other malignant neoplasm of skin (CODE)     3/28/2017    Rheumatic fever without heart involvement     10+ years ago    Tension-type headache, not intractable     No Comments Provided     Past Surgical History:   Procedure Laterality Date    BIOPSY BREAST      mid 1990s,benign    CATARACT EXTRACTION  09/2022    COLONOSCOPY      age 55,normal 01/01/2005    COLONOSCOPY  12/10/2015    12/10/2015,lnormal--repeat 10 years, 12/10/2025    FUSION CERVICAL ANTERIOR ONE LEVEL  12/2012    C5-6 on left with discectomy    LAMINECTOMY LUMBAR ONE LEVEL      1984,L4-5    LAPAROSCOPIC TUBAL LIGATION      FOR ECTOPIC PREGNANCY    OTHER SURGICAL HISTORY      ,FXP539,SKIN BIOPSY,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    OTHER SURGICAL HISTORY      1974,47410.0,[place] ECTOP PREG BY LAPAROSCOPE,Left,left tube and ovary removed    TONSILLECTOMY      1959     Current Outpatient Medications   Medication Sig Dispense Refill    acetaminophen-codeine (TYLENOL #3) 300-30 MG per tablet Take 2 tablets by mouth every 6 hours as needed for severe pain 60 tablet 0    calcium carbonate (OS-CAROL) 600 MG tablet Take by mouth 2 times daily      celecoxib (CELEBREX) 200 MG capsule Take 1 capsule (200 mg) by mouth daily 90 capsule 4    cholecalciferol 50 MCG (2000 UT) tablet Take 2,000 Units by mouth daily      diphenhydrAMINE (BENADRYL) 25 MG tablet Take 1 tablet (25 mg) by mouth every 6 hours as needed for itching or allergies         No Known Allergies     Social  "History     Tobacco Use    Smoking status: Never    Smokeless tobacco: Never   Substance Use Topics    Alcohol use: Not Currently     Alcohol/week: 1.0 standard drink of alcohol     Comment:  once weekly she will have a glass of beer or wine at holiday gatherings       History   Drug Use No           Review of Systems  Constitutional, HEENT, cardiovascular, pulmonary, GI, , musculoskeletal, neuro, skin, endocrine and psych systems are negative, except as otherwise noted.    Objective    BP (!) 140/90   Pulse 77   Temp 97.7  F (36.5  C) (Tympanic)   Resp 14   Ht 1.549 m (5' 1\")   Wt 54.9 kg (121 lb)   SpO2 97%   Breastfeeding No   BMI 22.86 kg/m     Estimated body mass index is 22.86 kg/m  as calculated from the following:    Height as of this encounter: 1.549 m (5' 1\").    Weight as of this encounter: 54.9 kg (121 lb).  Physical Exam  Constitutional:       General: She is not in acute distress.     Appearance: Normal appearance. She is not ill-appearing or toxic-appearing.   HENT:      Head: Normocephalic and atraumatic.   Eyes:      Pupils: Pupils are equal, round, and reactive to light.      Comments: Bilateral eyelids with excessive skin and drooping below the lash line    Cardiovascular:      Rate and Rhythm: Normal rate and regular rhythm.      Heart sounds: Normal heart sounds. No murmur heard.  Pulmonary:      Effort: Pulmonary effort is normal. No respiratory distress.      Breath sounds: Normal breath sounds. No stridor. No wheezing, rhonchi or rales.   Chest:      Chest wall: No tenderness.   Musculoskeletal:         General: Normal range of motion.   Skin:     General: Skin is warm and dry.   Neurological:      General: No focal deficit present.      Mental Status: She is alert and oriented to person, place, and time.   Psychiatric:         Mood and Affect: Mood normal.         Behavior: Behavior normal.         Recent Labs   Lab Test 05/30/24  1247 01/31/24  0926   HGB 12.7 12.1    243 "    136   POTASSIUM 4.3 4.6   CR 0.67 0.68        Diagnostics  No labs were ordered during this visit.   EKG: appears normal, NSR, normal axis, normal intervals, no acute ST/T changes c/w ischemia, no LVH by voltage criteria, unchanged from previous tracings    Revised Cardiac Risk Index (RCRI)  The patient has the following serious cardiovascular risks for perioperative complications:   - No serious cardiac risks = 0 points     RCRI Interpretation: 0 points: Class I (very low risk - 0.4% complication rate)       Signed Electronically by: Simi Altman NP  Copy of this evaluation report is provided to requesting physician.

## 2024-06-26 NOTE — Clinical Note
What procedure is being done? Brow lift and blepharoplasty Facility or Hospital where procedure/surgery will be performed: [facility] Who is doing the procedure / surgery? Dr. Young Date of surgery / procedure: 07- Time of surgery / procedure: TBD Where do you plan to recover after surgery? at home with family    Fax number for surgical facility: [phone]

## 2024-06-27 LAB
ATRIAL RATE - MUSE: 71 BPM
DIASTOLIC BLOOD PRESSURE - MUSE: NORMAL MMHG
INTERPRETATION ECG - MUSE: NORMAL
P AXIS - MUSE: 72 DEGREES
PR INTERVAL - MUSE: 160 MS
QRS DURATION - MUSE: 90 MS
QT - MUSE: 384 MS
QTC - MUSE: 417 MS
R AXIS - MUSE: 43 DEGREES
SYSTOLIC BLOOD PRESSURE - MUSE: NORMAL MMHG
T AXIS - MUSE: 23 DEGREES
VENTRICULAR RATE- MUSE: 71 BPM

## 2024-09-13 ENCOUNTER — OFFICE VISIT (OUTPATIENT)
Dept: FAMILY MEDICINE | Facility: OTHER | Age: 74
End: 2024-09-13
Attending: FAMILY MEDICINE
Payer: COMMERCIAL

## 2024-09-13 VITALS
OXYGEN SATURATION: 97 % | TEMPERATURE: 97.7 F | BODY MASS INDEX: 22.07 KG/M2 | RESPIRATION RATE: 18 BRPM | WEIGHT: 116.8 LBS | DIASTOLIC BLOOD PRESSURE: 74 MMHG | HEART RATE: 104 BPM | SYSTOLIC BLOOD PRESSURE: 122 MMHG

## 2024-09-13 DIAGNOSIS — M50.10 CERVICAL RADICULOPATHY DUE TO INTERVERTEBRAL DISC DISORDER: Primary | ICD-10-CM

## 2024-09-13 DIAGNOSIS — M54.16 LEFT LUMBAR RADICULOPATHY: ICD-10-CM

## 2024-09-13 PROCEDURE — G0463 HOSPITAL OUTPT CLINIC VISIT: HCPCS

## 2024-09-13 PROCEDURE — 99214 OFFICE O/P EST MOD 30 MIN: CPT | Performed by: FAMILY MEDICINE

## 2024-09-13 RX ORDER — LIDOCAINE 4 G/G
1 PATCH TOPICAL EVERY 24 HOURS
COMMUNITY

## 2024-09-13 RX ORDER — ACETAMINOPHEN AND CODEINE PHOSPHATE 300; 30 MG/1; MG/1
2 TABLET ORAL EVERY 6 HOURS PRN
Qty: 60 TABLET | Refills: 0 | Status: SHIPPED | OUTPATIENT
Start: 2024-09-13

## 2024-09-13 ASSESSMENT — PAIN SCALES - GENERAL: PAINLEVEL: SEVERE PAIN (7)

## 2024-09-13 NOTE — PROGRESS NOTES
Assessment & Plan       ICD-10-CM    1. Cervical radiculopathy due to intervertebral disc disorder  M50.10 Physical Therapy  Referral     Chiropractic Referral     MR CERVICAL SPINE W/O & W CONTRAST     acetaminophen-codeine (TYLENOL #3) 300-30 MG per tablet      2. Left lumbar radiculopathy  M54.16 acetaminophen-codeine (TYLENOL #3) 300-30 MG per tablet          Will proceed with updated MRI.  Patient is referred to PT and Colin for options for pain management.  Continue with occasional use of T3's which have worked well for her in the past.  She will follow-up with any additional concerns or progression of symptoms.      No follow-ups on file.    Subjective Corinne is a 74 year old, presenting for the following health issues:  Neck Pain (Has had neck pain before but this is different and is increasing. States it is a nerve pain that shoots down her arms and into her back as well. This has been getting worst for a couple months. Has had cortisone injections before for this and thinks that last one was about 2 years ago. )      9/13/2024     3:48 PM   Additional Questions   Roomed by Wendi   Accompanied by self     History of Present Illness       Reason for visit:  Chronic cervical painShe consumes 1 sweetened beverage(s) daily. She exercises with enough effort to increase her heart rate 6 days per week.   She is taking medications regularly.       Patient is a longstanding history of cervical spine disease, with advanced degenerative changes.  She has done well with steroid injections in the past and would like to pursue an injection, but needs an updated MRI.    It has been a number of years since she has tried physical therapy.  In the past she did find ultrasound and traction helpful.  She is reluctant to see a chiropractor.    She does have Tylenol No. 3 available that she uses for more severe pain.  Her last prescription was dispensed on 7/23/2024, 56 tablets.    She does intermittently  experience radicular symptoms into her arms.  This has been happening for years, and is usually a sign for her that she needs to proceed with an updated injection.    She walks 3 miles most days.  She is tries to stay very active.  She is finding it harder and harder to keep her head up, because it is very fatiguing.        Objective    /74 (BP Location: Right arm, Patient Position: Sitting, Cuff Size: Adult Regular)   Pulse 104   Temp 97.7  F (36.5  C) (Tympanic)   Resp 18   Wt 53 kg (116 lb 12.8 oz)   SpO2 97%   BMI 22.07 kg/m    Body mass index is 22.07 kg/m .  Physical Exam  Constitutional:       Appearance: She is well-developed.   HENT:      Right Ear: External ear normal.      Left Ear: External ear normal.   Eyes:      General: No scleral icterus.     Conjunctiva/sclera: Conjunctivae normal.   Cardiovascular:      Rate and Rhythm: Normal rate.   Pulmonary:      Effort: Pulmonary effort is normal. No respiratory distress.   Musculoskeletal:      Comments: Head is tilted forward, loss of normal cervical spine curvature.    Skin:     Findings: No rash.   Neurological:      Mental Status: She is alert.             Signed Electronically by: Holley Dos Santos MD

## 2024-09-13 NOTE — NURSING NOTE
"Chief Complaint   Patient presents with    Neck Pain     Has had neck pain before but this is different and is increasing. States it is a nerve pain that shoots down her arms and into her back as well. This has been getting worst for a couple months. Has had cortisone injections before for this and thinks that last one was about 2 years ago.        Initial /74 (BP Location: Right arm, Patient Position: Sitting, Cuff Size: Adult Regular)   Pulse 104   Temp 97.7  F (36.5  C) (Tympanic)   Resp 18   Wt 53 kg (116 lb 12.8 oz)   SpO2 97%   BMI 22.07 kg/m   Estimated body mass index is 22.07 kg/m  as calculated from the following:    Height as of 6/26/24: 1.549 m (5' 1\").    Weight as of this encounter: 53 kg (116 lb 12.8 oz).  Medication Reconciliation: complete    Wendi Strickland LPN    Advance Care Directive reviewed    "

## 2024-09-17 ENCOUNTER — HOSPITAL ENCOUNTER (OUTPATIENT)
Dept: MRI IMAGING | Facility: OTHER | Age: 74
Discharge: HOME | End: 2024-09-17
Attending: FAMILY MEDICINE | Admitting: FAMILY MEDICINE
Payer: MEDICARE

## 2024-09-17 DIAGNOSIS — M50.10 CERVICAL RADICULOPATHY DUE TO INTERVERTEBRAL DISC DISORDER: ICD-10-CM

## 2024-09-17 PROCEDURE — A9575 INJ GADOTERATE MEGLUMI 0.1ML: HCPCS | Performed by: FAMILY MEDICINE

## 2024-09-17 PROCEDURE — G1010 CDSM STANSON: HCPCS

## 2024-09-17 PROCEDURE — 255N000002 HC RX 255 OP 636: Performed by: FAMILY MEDICINE

## 2024-09-17 RX ORDER — GADOTERATE MEGLUMINE 376.9 MG/ML
15 INJECTION INTRAVENOUS ONCE
Status: COMPLETED | OUTPATIENT
Start: 2024-09-17 | End: 2024-09-17

## 2024-09-17 RX ADMIN — GADOTERATE MEGLUMINE 10 ML: 376.9 INJECTION INTRAVENOUS at 15:15

## 2024-09-20 DIAGNOSIS — M47.22 OTHER SPONDYLOSIS WITH RADICULOPATHY, CERVICAL REGION: ICD-10-CM

## 2024-09-20 DIAGNOSIS — M50.10 CERVICAL RADICULOPATHY DUE TO INTERVERTEBRAL DISC DISORDER: Primary | ICD-10-CM

## 2024-10-01 ENCOUNTER — HOSPITAL ENCOUNTER (OUTPATIENT)
Dept: GENERAL RADIOLOGY | Facility: OTHER | Age: 74
Discharge: HOME | End: 2024-10-01
Attending: FAMILY MEDICINE | Admitting: FAMILY MEDICINE
Payer: MEDICARE

## 2024-10-01 DIAGNOSIS — M50.10 CERVICAL RADICULOPATHY DUE TO INTERVERTEBRAL DISC DISORDER: ICD-10-CM

## 2024-10-01 DIAGNOSIS — M47.22 OTHER SPONDYLOSIS WITH RADICULOPATHY, CERVICAL REGION: ICD-10-CM

## 2024-10-01 PROCEDURE — 250N000011 HC RX IP 250 OP 636: Performed by: RADIOLOGY

## 2024-10-01 PROCEDURE — 250N000009 HC RX 250: Performed by: RADIOLOGY

## 2024-10-01 PROCEDURE — 255N000002 HC RX 255 OP 636: Performed by: RADIOLOGY

## 2024-10-01 PROCEDURE — 62321 NJX INTERLAMINAR CRV/THRC: CPT

## 2024-10-01 RX ORDER — LIDOCAINE HYDROCHLORIDE 10 MG/ML
20 INJECTION, SOLUTION INFILTRATION; PERINEURAL ONCE
Status: COMPLETED | OUTPATIENT
Start: 2024-10-01 | End: 2024-10-01

## 2024-10-01 RX ORDER — BETAMETHASONE SODIUM PHOSPHATE AND BETAMETHASONE ACETATE 3; 3 MG/ML; MG/ML
12 INJECTION, SUSPENSION INTRA-ARTICULAR; INTRALESIONAL; INTRAMUSCULAR; SOFT TISSUE ONCE
Status: COMPLETED | OUTPATIENT
Start: 2024-10-01 | End: 2024-10-01

## 2024-10-01 RX ADMIN — IOHEXOL 2 ML: 240 INJECTION, SOLUTION INTRATHECAL; INTRAVASCULAR; INTRAVENOUS; ORAL at 09:35

## 2024-10-01 RX ADMIN — BETAMETHASONE SODIUM PHOSPHATE AND BETAMETHASONE ACETATE 12 MG: 3; 3 INJECTION, SUSPENSION INTRA-ARTICULAR; INTRALESIONAL; INTRAMUSCULAR at 09:35

## 2024-10-01 RX ADMIN — LIDOCAINE HYDROCHLORIDE 2 ML: 10 INJECTION, SOLUTION INFILTRATION; PERINEURAL at 09:35

## 2024-12-16 ENCOUNTER — OFFICE VISIT (OUTPATIENT)
Dept: SURGERY | Facility: OTHER | Age: 74
End: 2024-12-16
Attending: SURGERY
Payer: MEDICARE

## 2024-12-16 VITALS
OXYGEN SATURATION: 98 % | TEMPERATURE: 98.8 F | HEART RATE: 74 BPM | HEIGHT: 61 IN | DIASTOLIC BLOOD PRESSURE: 68 MMHG | SYSTOLIC BLOOD PRESSURE: 126 MMHG | WEIGHT: 122.6 LBS | RESPIRATION RATE: 17 BRPM | BODY MASS INDEX: 23.15 KG/M2

## 2024-12-16 DIAGNOSIS — L98.9 SKIN LESION OF CHEST WALL: Primary | ICD-10-CM

## 2024-12-16 PROCEDURE — G0463 HOSPITAL OUTPT CLINIC VISIT: HCPCS | Mod: 25

## 2024-12-16 PROCEDURE — 11403 EXC TR-EXT B9+MARG 2.1-3CM: CPT | Performed by: SURGERY

## 2024-12-16 ASSESSMENT — PAIN SCALES - GENERAL: PAINLEVEL_OUTOF10: MILD PAIN (2)

## 2024-12-16 NOTE — PROGRESS NOTES
Procedure Note     Pre/Post Operative Diagnosis:   upper chest skin lesion    Procedure:    Excision of upper chest skin lesion    Surgeon: DASHA Johnson MD     Local Anesthesia: 1% lidocaine with epinephrine    Indication for the procedure:    This is a 74 year old female patient with upper chest skin lesion. History of SCC  Clinically, 8 mm x 1 cm erythematous macule with overlying crust, concern for actinic keratosis versus squamous cell cancer.  Will excise in clinic today.  After explaining the risks to include bleeding, infection, recurrence or need for re-excision, and scarring the patient wished to proceed.    Procedure:   The area was prepped and draped in usual sterile fashion with ChloraPrep. After adequate local anesthesia, an elliptical skin incision was made to encompass the lesion, 2.2cm x 1.2 cm with margins. The subcutaneous tissues were reapproximated with 3-0 vicryl in inverted interrupted fashion. The skin was closed with 4-0 monocryl suture in a running fashion.  Dermabond was applied.     Plan:  The patient will be called with pathology results.  Patient will followup if there any problems with the wound including redness or drainage.      DASHA Johnson MD

## 2024-12-16 NOTE — PATIENT INSTRUCTIONS
Instructions:      Your incision was closed with stitches that will dissolve.  A surgical glue was used to help keep the incision closed. Do not apply any Vaseline, triple antibiotics, bacitracin, or any product on the glue as this may soften and create it to be sticky which may cause skin irritation. It is ok to remove the dressing and get the incision wet in the shower on the day after your procedure. Pat dry the area. Do not rub. Don't soak in a tub, pool or lake for 7 days.     Pathology can take up to 7-10 days to be completed. Once pathology is reviewed by the provider we will give you a call, letter, or MyChart message with the results. You may see the results prior to the provider reviewing them through your MyChart.       Monitor for any signs of infection:     Redness in the area of the wound, particularly if it spreads or forms a red streak  Swelling or warmth in the affected area  Pain or tenderness at or around the site of the wound  Pus forming around or oozing from the wound  Fever  Delayed wound healing        Please call the General Surgery office and ask for a nurse in unit 4S with problems, questions, or concerns at [phone].    General Surgery Nurses  Airam Eduardo LPN

## 2024-12-16 NOTE — NURSING NOTE
"Chief Complaint   Patient presents with    Minor Procedure       Medication reconciliation completed.    FOOD SECURITY SCREENING QUESTIONS:    The next two questions are to help us understand your food security.  If you are feeling you need any assistance in this area, we have resources available to support you today.    Hunger Vital Signs:  Within the past 12 months we worried whether our food would run out before we got money to buy more. Never  Within the past 12 months the food we bought just didn't last and we didn't have money to get more. Never    Initial /68   Pulse 74   Temp 98.8  F (37.1  C)   Resp 17   Ht 1.549 m (5' 1\")   Wt 55.6 kg (122 lb 9.6 oz)   SpO2 98%   BMI 23.17 kg/m   Estimated body mass index is 23.17 kg/m  as calculated from the following:    Height as of this encounter: 1.549 m (5' 1\").    Weight as of this encounter: 55.6 kg (122 lb 9.6 oz).       Griselda Tillman LPN .......  12/16/2024  9:04 AM  TIMEOUT    Universal Protocol    A. Pre-procedure verification complete   1-relevant information / documentation available, reviewed and properly matched to the patient; 2-consent accurate and complete, 3-equipment and supplies available    B. Site marking complete   Site marked if not in continuous attendance with patient    C. TIME OUT completed   Time Out was conducted just prior to starting procedure to verify the eight required elements: 1-patient identity, 2-consent accurate and complete, 3-position, 4-correct side/site marked (if applicable), 5-procedure, 6-relevant images / results properly labeled and displayed (if applicable), 7-antibiotics / irrigation fluids (if applicable), 8-safety precautions.    Surgical Nurse  ....................  12/16/2024   9:04 AM   "

## 2024-12-31 DIAGNOSIS — L57.0 MULTIPLE ACTINIC KERATOSES: Primary | ICD-10-CM

## 2024-12-31 RX ORDER — FLUOROURACIL 50 MG/G
CREAM TOPICAL 2 TIMES DAILY
Qty: 160 G | Refills: 0 | Status: SHIPPED | OUTPATIENT
Start: 2024-12-31

## 2025-01-08 ENCOUNTER — MYC MEDICAL ADVICE (OUTPATIENT)
Dept: SURGERY | Facility: OTHER | Age: 75
End: 2025-01-08
Payer: COMMERCIAL

## 2025-01-08 DIAGNOSIS — L57.0 MULTIPLE ACTINIC KERATOSES: ICD-10-CM

## 2025-01-08 NOTE — TELEPHONE ENCOUNTER
Disp Refills Start End ROSA    fluorouracil (EFUDEX) 5 % external cream 160 g 0 12/31/2024 -- No   Sig - Route: Apply topically 2 times daily. - Topical     [facility]. Airam Duncan RN  ....................  1/8/2025   1:38 PM

## 2025-01-09 RX ORDER — FLUOROURACIL 50 MG/G
CREAM TOPICAL 2 TIMES DAILY
Qty: 160 G | Refills: 0 | Status: SHIPPED | OUTPATIENT
Start: 2025-01-09

## 2025-01-09 NOTE — TELEPHONE ENCOUNTER
Patient reports they do not use [facility]. She is unsure why it was sent there when she uses [facility]. Will route to provider to see why it was sent to [facility] and not [facility].     Airam Duncan RN  ....................  1/9/2025   8:09 AM

## 2025-01-30 SDOH — HEALTH STABILITY: PHYSICAL HEALTH: ON AVERAGE, HOW MANY MINUTES DO YOU ENGAGE IN EXERCISE AT THIS LEVEL?: 60 MIN

## 2025-01-30 SDOH — HEALTH STABILITY: PHYSICAL HEALTH: ON AVERAGE, HOW MANY DAYS PER WEEK DO YOU ENGAGE IN MODERATE TO STRENUOUS EXERCISE (LIKE A BRISK WALK)?: 6 DAYS

## 2025-01-30 ASSESSMENT — SOCIAL DETERMINANTS OF HEALTH (SDOH): HOW OFTEN DO YOU GET TOGETHER WITH FRIENDS OR RELATIVES?: MORE THAN THREE TIMES A WEEK

## 2025-02-03 ENCOUNTER — OFFICE VISIT (OUTPATIENT)
Dept: FAMILY MEDICINE | Facility: OTHER | Age: 75
End: 2025-02-03
Attending: STUDENT IN AN ORGANIZED HEALTH CARE EDUCATION/TRAINING PROGRAM
Payer: MEDICARE

## 2025-02-03 VITALS
BODY MASS INDEX: 22.84 KG/M2 | HEIGHT: 61 IN | RESPIRATION RATE: 16 BRPM | WEIGHT: 121 LBS | DIASTOLIC BLOOD PRESSURE: 82 MMHG | SYSTOLIC BLOOD PRESSURE: 128 MMHG | HEART RATE: 72 BPM | TEMPERATURE: 97.7 F | OXYGEN SATURATION: 98 %

## 2025-02-03 DIAGNOSIS — M50.10 CERVICAL RADICULOPATHY DUE TO INTERVERTEBRAL DISC DISORDER: ICD-10-CM

## 2025-02-03 DIAGNOSIS — Z12.31 ENCOUNTER FOR SCREENING MAMMOGRAM FOR BREAST CANCER: ICD-10-CM

## 2025-02-03 DIAGNOSIS — B07.0 PLANTAR WART: ICD-10-CM

## 2025-02-03 DIAGNOSIS — Z00.00 ENCOUNTER FOR MEDICARE ANNUAL WELLNESS EXAM: Primary | ICD-10-CM

## 2025-02-03 DIAGNOSIS — M54.16 LEFT LUMBAR RADICULOPATHY: ICD-10-CM

## 2025-02-03 LAB
ALBUMIN SERPL BCG-MCNC: 4.8 G/DL (ref 3.5–5.2)
ALP SERPL-CCNC: 59 U/L (ref 40–150)
ALT SERPL W P-5'-P-CCNC: 24 U/L (ref 0–50)
ANION GAP SERPL CALCULATED.3IONS-SCNC: 12 MMOL/L (ref 7–15)
AST SERPL W P-5'-P-CCNC: 34 U/L (ref 0–45)
BASOPHILS # BLD AUTO: 0 10E3/UL (ref 0–0.2)
BASOPHILS NFR BLD AUTO: 1 %
BILIRUB SERPL-MCNC: 0.6 MG/DL
BUN SERPL-MCNC: 14.4 MG/DL (ref 8–23)
CALCIUM SERPL-MCNC: 10 MG/DL (ref 8.8–10.4)
CHLORIDE SERPL-SCNC: 100 MMOL/L (ref 98–107)
CHOLEST SERPL-MCNC: 218 MG/DL
CREAT SERPL-MCNC: 0.75 MG/DL (ref 0.51–0.95)
EGFRCR SERPLBLD CKD-EPI 2021: 83 ML/MIN/1.73M2
EOSINOPHIL # BLD AUTO: 0.2 10E3/UL (ref 0–0.7)
EOSINOPHIL NFR BLD AUTO: 4 %
ERYTHROCYTE [DISTWIDTH] IN BLOOD BY AUTOMATED COUNT: 13.9 % (ref 10–15)
FASTING STATUS PATIENT QL REPORTED: YES
FASTING STATUS PATIENT QL REPORTED: YES
GLUCOSE SERPL-MCNC: 104 MG/DL (ref 70–99)
HCO3 SERPL-SCNC: 27 MMOL/L (ref 22–29)
HCT VFR BLD AUTO: 36.9 % (ref 35–47)
HDLC SERPL-MCNC: 91 MG/DL
HGB BLD-MCNC: 12.6 G/DL (ref 11.7–15.7)
IMM GRANULOCYTES # BLD: 0 10E3/UL
IMM GRANULOCYTES NFR BLD: 0 %
LDLC SERPL CALC-MCNC: 110 MG/DL
LYMPHOCYTES # BLD AUTO: 1.8 10E3/UL (ref 0.8–5.3)
LYMPHOCYTES NFR BLD AUTO: 32 %
MCH RBC QN AUTO: 32 PG (ref 26.5–33)
MCHC RBC AUTO-ENTMCNC: 34.1 G/DL (ref 31.5–36.5)
MCV RBC AUTO: 94 FL (ref 78–100)
MONOCYTES # BLD AUTO: 0.6 10E3/UL (ref 0–1.3)
MONOCYTES NFR BLD AUTO: 12 %
NEUTROPHILS # BLD AUTO: 2.8 10E3/UL (ref 1.6–8.3)
NEUTROPHILS NFR BLD AUTO: 51 %
NONHDLC SERPL-MCNC: 127 MG/DL
NRBC # BLD AUTO: 0 10E3/UL
NRBC BLD AUTO-RTO: 0 /100
PLATELET # BLD AUTO: 218 10E3/UL (ref 150–450)
POTASSIUM SERPL-SCNC: 4.3 MMOL/L (ref 3.4–5.3)
PROT SERPL-MCNC: 7.5 G/DL (ref 6.4–8.3)
RBC # BLD AUTO: 3.94 10E6/UL (ref 3.8–5.2)
SODIUM SERPL-SCNC: 139 MMOL/L (ref 135–145)
TRIGL SERPL-MCNC: 87 MG/DL
WBC # BLD AUTO: 5.5 10E3/UL (ref 4–11)

## 2025-02-03 PROCEDURE — 36415 COLL VENOUS BLD VENIPUNCTURE: CPT | Mod: ZL | Performed by: STUDENT IN AN ORGANIZED HEALTH CARE EDUCATION/TRAINING PROGRAM

## 2025-02-03 PROCEDURE — 85025 COMPLETE CBC W/AUTO DIFF WBC: CPT | Mod: ZL | Performed by: STUDENT IN AN ORGANIZED HEALTH CARE EDUCATION/TRAINING PROGRAM

## 2025-02-03 PROCEDURE — 80061 LIPID PANEL: CPT | Mod: ZL | Performed by: STUDENT IN AN ORGANIZED HEALTH CARE EDUCATION/TRAINING PROGRAM

## 2025-02-03 PROCEDURE — G0463 HOSPITAL OUTPT CLINIC VISIT: HCPCS | Mod: 25

## 2025-02-03 PROCEDURE — 90662 IIV NO PRSV INCREASED AG IM: CPT

## 2025-02-03 PROCEDURE — 80051 ELECTROLYTE PANEL: CPT | Mod: ZL | Performed by: STUDENT IN AN ORGANIZED HEALTH CARE EDUCATION/TRAINING PROGRAM

## 2025-02-03 PROCEDURE — 17110 DESTRUCTION B9 LES UP TO 14: CPT | Performed by: STUDENT IN AN ORGANIZED HEALTH CARE EDUCATION/TRAINING PROGRAM

## 2025-02-03 PROCEDURE — G0008 ADMIN INFLUENZA VIRUS VAC: HCPCS

## 2025-02-03 PROCEDURE — 82947 ASSAY GLUCOSE BLOOD QUANT: CPT | Mod: ZL | Performed by: STUDENT IN AN ORGANIZED HEALTH CARE EDUCATION/TRAINING PROGRAM

## 2025-02-03 RX ORDER — ACETAMINOPHEN AND CODEINE PHOSPHATE 300; 30 MG/1; MG/1
2 TABLET ORAL EVERY 6 HOURS PRN
Qty: 60 TABLET | Refills: 5 | Status: SHIPPED | OUTPATIENT
Start: 2025-02-03

## 2025-02-03 RX ORDER — CELECOXIB 200 MG/1
200 CAPSULE ORAL DAILY
Qty: 90 CAPSULE | Refills: 4 | Status: SHIPPED | OUTPATIENT
Start: 2025-02-03

## 2025-02-03 ASSESSMENT — PAIN SCALES - GENERAL: PAINLEVEL_OUTOF10: MILD PAIN (2)

## 2025-02-03 NOTE — PATIENT INSTRUCTIONS
Patient Education   Preventive Care Advice   This is general advice given by our system to help you stay healthy. However, your care team may have specific advice just for you. Please talk to your care team about your preventive care needs.  Nutrition  Eat 5 or more servings of fruits and vegetables each day.  Try wheat bread, brown rice and whole grain pasta (instead of white bread, rice, and pasta).  Get enough calcium and vitamin D. Check the label on foods and aim for 100% of the RDA (recommended daily allowance).  Lifestyle  Exercise at least 150 minutes each week  (30 minutes a day, 5 days a week).  Do muscle strengthening activities 2 days a week. These help control your weight and prevent disease.  No smoking.  Wear sunscreen to prevent skin cancer.  Have a dental exam and cleaning every 6 months.  Yearly exams  See your health care team every year to talk about:  Any changes in your health.  Any medicines your care team has prescribed.  Preventive care, family planning, and ways to prevent chronic diseases.  Shots (vaccines)   HPV shots (up to age 26), if you've never had them before.  Hepatitis B shots (up to age 59), if you've never had them before.  COVID-19 shot: Get this shot when it's due.  Flu shot: Get a flu shot every year.  Tetanus shot: Get a tetanus shot every 10 years.  Pneumococcal, hepatitis A, and RSV shots: Ask your care team if you need these based on your risk.  Shingles shot (for age 50 and up)  General health tests  Diabetes screening:  Starting at age 35, Get screened for diabetes at least every 3 years.  If you are younger than age 35, ask your care team if you should be screened for diabetes.  Cholesterol test: At age 39, start having a cholesterol test every 5 years, or more often if advised.  Bone density scan (DEXA): At age 50, ask your care team if you should have this scan for osteoporosis (brittle bones).  Hepatitis C: Get tested at least once in your life.  STIs (sexually  transmitted infections)  Before age 24: Ask your care team if you should be screened for STIs.  After age 24: Get screened for STIs if you're at risk. You are at risk for STIs (including HIV) if:  You are sexually active with more than one person.  You don't use condoms every time.  You or a partner was diagnosed with a sexually transmitted infection.  If you are at risk for HIV, ask about PrEP medicine to prevent HIV.  Get tested for HIV at least once in your life, whether you are at risk for HIV or not.  Cancer screening tests  Cervical cancer screening: If you have a cervix, begin getting regular cervical cancer screening tests starting at age 21.  Breast cancer scan (mammogram): If you've ever had breasts, begin having regular mammograms starting at age 40. This is a scan to check for breast cancer.  Colon cancer screening: It is important to start screening for colon cancer at age 45.  Have a colonoscopy test every 10 years (or more often if you're at risk) Or, ask your provider about stool tests like a FIT test every year or Cologuard test every 3 years.  To learn more about your testing options, visit:   .  For help making a decision, visit:   https://bit.ly/rl57379.  Prostate cancer screening test: If you have a prostate, ask your care team if a prostate cancer screening test (PSA) at age 55 is right for you.  Lung cancer screening: If you are a current or former smoker ages 50 to 80, ask your care team if ongoing lung cancer screenings are right for you.  For informational purposes only. Not to replace the advice of your health care provider. Copyright   2023 [facility]. All rights reserved. Clinically reviewed by the [facility]. [employer] 601067 - REV 01/24.  Learning About Activities of Daily Living  What are activities of daily living?     Activities of daily living (ADLs) are the basic self-care tasks you do every day. These include eating, bathing, dressing,  and moving around.  As you age, and if you have health problems, you may find that it's harder to do some of these tasks. If so, your doctor can suggest ideas that may help.  To measure what kind of help you may need, your doctor will ask how well you are able to do ADLs. Let your doctor know if there are any tasks that you are having trouble doing. This is an important first step to getting help. And when you have the help you need, you can stay as independent as possible.  How will a doctor assess your ADLs?  Asking about ADLs is part of a routine health checkup your doctor will likely do as you age. Your health check might be done in a doctor's office, in your home, or at a hospital. The goal is to find out if you are having any problems that could make it hard to care for yourself or that make it unsafe for you to be on your own.  To measure your ADLs, your doctor will ask how hard it is for you to do routine tasks. Your doctor may also want to know if you have changed the way you do a task because of a health problem. Your doctor may watch how you:  Walk back and forth.  Keep your balance while you stand or walk.  Move from sitting to standing or from a bed to a chair.  Button or unbutton a shirt or sweater.  Remove and put on your shoes.  It's common to feel a little worried or anxious if you find you can't do all the things you used to be able to do. Talking with your doctor about ADLs is a way to make sure you're as safe as possible and able to care for yourself as well as you can. You may want to bring a caregiver, friend, or family member to your checkup. They can help you talk to your doctor.  Follow-up care is a key part of your treatment and safety. Be sure to make and go to all appointments, and call your doctor if you are having problems. It's also a good idea to know your test results and keep a list of the medicines you take.  Current as of: October 24, 2023  Content Version: 14.3    2024 [facility]  [facility].   Care instructions adapted under license by your healthcare professional. If you have questions about a medical condition or this instruction, always ask your healthcare professional. [employer] disclaims any warranty or liability for your use of this information.    Hearing Loss: Care Instructions  Overview     Hearing loss is a sudden or slow decrease in how well you hear. It can range from slight to profound. Permanent hearing loss can occur with aging. It also can happen when you are exposed long-term to loud noise. Examples include listening to loud music, riding motorcycles, or being around other loud machines.  Hearing loss can affect your work and home life. It can make you feel lonely or depressed. You may feel that you have lost your independence. But hearing aids and other devices can help you hear better and feel connected to others.  Follow-up care is a key part of your treatment and safety. Be sure to make and go to all appointments, and call your doctor if you are having problems. It's also a good idea to know your test results and keep a list of the medicines you take.  How can you care for yourself at home?  Avoid loud noises whenever possible. This helps keep your hearing from getting worse.  Always wear hearing protection around loud noises.  Wear a hearing aid as directed.  A professional can help you pick a hearing aid that will work best for you.  You can also get hearing aids over the counter for mild to moderate hearing loss.  Have hearing tests as your doctor suggests. They can show whether your hearing has changed. Your hearing aid may need to be adjusted.  Use other devices as needed. These may include:  Telephone amplifiers and hearing aids that can connect to a television, stereo, radio, or microphone.  Devices that use lights or vibrations. These alert you to the doorbell, a ringing telephone, or a baby monitor.  Television closed-captioning. This shows the  "words at the bottom of the screen. Most new TVs can do this.  TTY (text telephone). This lets you type messages back and forth on the telephone instead of talking or listening. These devices are also called TDD. When messages are typed on the keyboard, they are sent over the phone line to a receiving TTY. The message is shown on a monitor.  Use text messaging, social media, and email if it is hard for you to communicate by telephone.  Try to learn a listening technique called speechreading. It is not lipreading. You pay attention to people's gestures, expressions, posture, and tone of voice. These clues can help you understand what a person is saying. Face the person you are talking to, and have them face you. Make sure the lighting is good. You need to see the other person's face clearly.  Think about counseling if you need help to adjust to your hearing loss.  When should you call for help?  Watch closely for changes in your health, and be sure to contact your doctor if:    You think your hearing is getting worse.     You have new symptoms, such as dizziness or nausea.   Where can you learn more?  Go to https://www.[employer].net/patiented  Enter R798 in the search box to learn more about \"Hearing Loss: Care Instructions.\"  Current as of: September 27, 2023  Content Version: 14.3    2024 [employer].   Care instructions adapted under license by your healthcare professional. If you have questions about a medical condition or this instruction, always ask your healthcare professional. [employer] disclaims any warranty or liability for your use of this information.    Bladder Training: Care Instructions  Your Care Instructions     Bladder training is used to treat urge incontinence and stress incontinence. Urge incontinence means that the need to urinate comes on so fast that you can't get to a toilet in time. Stress incontinence means that you leak urine because of pressure on your bladder. For " example, it may happen when you laugh, cough, or lift something heavy.  Bladder training can increase how long you can wait before you have to urinate. It can also help your bladder hold more urine. And it can give you better control over the urge to urinate.  It is important to remember that bladder training takes a few weeks to a few months to make a difference. You may not see results right away, but don't give up.  Follow-up care is a key part of your treatment and safety. Be sure to make and go to all appointments, and call your doctor if you are having problems. It's also a good idea to know your test results and keep a list of the medicines you take.  How can you care for yourself at home?  Work with your doctor to come up with a bladder training program that is right for you. You may use one or more of the following methods.  Delayed urination  In the beginning, try to keep from urinating for 5 minutes after you first feel the need to go.  While you wait, take deep, slow breaths to relax. Kegel exercises can also help you delay the need to go to the bathroom.  After some practice, when you can easily wait 5 minutes to urinate, try to wait 10 minutes before you urinate.  Slowly increase the waiting period until you are able to control when you have to urinate.  Scheduled urination  Empty your bladder when you first wake up in the morning.  Schedule times throughout the day when you will urinate.  Start by going to the bathroom every hour, even if you don't need to go.  Slowly increase the time between trips to the bathroom.  When you have found a schedule that works well for you, keep doing it.  If you wake up during the night and have to urinate, do it. Apply your schedule to waking hours only.  Kegel exercises  These tighten and strengthen pelvic muscles, which can help you control the flow of urine. (If doing these exercises causes pain, stop doing them and talk with your doctor.) To do Kegel  "exercises:  Squeeze your muscles as if you were trying not to pass gas. Or squeeze your muscles as if you were stopping the flow of urine. Your belly, legs, and buttocks shouldn't move.  Hold the squeeze for 3 seconds, then relax for 5 to 10 seconds.  Start with 3 seconds, then add 1 second each week until you are able to squeeze for 10 seconds.  Repeat the exercise 10 times a session. Do 3 to 8 sessions a day.  When should you call for help?  Watch closely for changes in your health, and be sure to contact your doctor if:    Your incontinence is getting worse.     You do not get better as expected.   Where can you learn more?  Go to https://www.[employer]net/patiented  Enter V684 in the search box to learn more about \"Bladder Training: Care Instructions.\"  Current as of: April 30, 2024  Content Version: 14.3    2024 [employer].   Care instructions adapted under license by your healthcare professional. If you have questions about a medical condition or this instruction, always ask your healthcare professional. [employer] disclaims any warranty or liability for your use of this information.    Chronic Pain: Care Instructions  Your Care Instructions     Chronic pain is pain that lasts a long time (months or even years) and may or may not have a clear cause. It is different from acute pain, which usually does have a clear cause--like an injury or illness--and gets better over time. Chronic pain:  Lasts over time but may vary from day to day.  Does not go away despite efforts to end it.  May disrupt your sleep and lead to fatigue.  May cause depression or anxiety.  May make your muscles tense, causing more pain.  Can disrupt your work, hobbies, home life, and relationships with friends and family.  Chronic pain is a very real condition. It is not just in your head. Treatment can help and usually includes several methods used together, such as medicines, physical therapy, exercise, and other " treatments. Learning how to relax and changing negative thought patterns can also help you cope.  Chronic pain is complex. Taking an active role in your treatment will help you better manage your pain. Tell your doctor if you have trouble dealing with your pain. You may have to try several things before you find what works best for you.  Follow-up care is a key part of your treatment and safety. Be sure to make and go to all appointments, and call your doctor if you are having problems. It's also a good idea to know your test results and keep a list of the medicines you take.  How can you care for yourself at home?  Pace yourself. Break up large jobs into smaller tasks. Save harder tasks for days when you have less pain, or go back and forth between hard tasks and easier ones. Take rest breaks.  Relax, and reduce stress. Relaxation techniques such as deep breathing or meditation can help.  Keep moving. Gentle, daily exercise can help reduce pain over the long run. Try low- or no-impact exercises such as walking, swimming, and stationary biking. Do stretches to stay flexible.  Try heat, cold packs, and massage.  Get enough sleep. Chronic pain can make you tired and drain your energy. Talk with your doctor if you have trouble sleeping because of pain.  Think positive. Your thoughts can affect your pain level. Do things that you enjoy to distract yourself when you have pain instead of focusing on the pain. See a movie, read a book, listen to music, or spend time with a friend.  If you think you are depressed, talk to your doctor about treatment.  Keep a daily pain diary. Record how your moods, thoughts, sleep patterns, activities, and medicine affect your pain. You may find that your pain is worse during or after certain activities or when you are feeling a certain emotion. Having a record of your pain can help you and your doctor find the best ways to treat your pain.  Take pain medicines exactly as directed.  If the  "doctor gave you a prescription medicine for pain, take it as prescribed.  If you are not taking a prescription pain medicine, ask your doctor if you can take an over-the-counter medicine.  Reducing constipation caused by pain medicine  Talk to your doctor about a laxative. If a laxative doesn't work, your doctor may suggest a prescription medicine.  Include fruits, vegetables, beans, and whole grains in your diet each day. These foods are high in fiber.  If your doctor recommends it, get more exercise. Walking is a good choice. Bit by bit, increase the amount you walk every day. Try for at least 30 minutes on most days of the week.  Schedule time each day for a bowel movement. A daily routine may help. Take your time and do not strain when having a bowel movement.  When should you call for help?   Call your doctor now or seek immediate medical care if:    Your pain gets worse or is out of control.     You feel down or blue, or you do not enjoy things like you once did. You may be depressed, which is common in people with chronic pain. Depression can be treated.     You have vomiting or cramps for more than 2 hours.   Watch closely for changes in your health, and be sure to contact your doctor if:    You cannot sleep because of pain.     You are very worried or anxious about your pain.     You have trouble taking your pain medicine.     You have any concerns about your pain medicine.     You have trouble with bowel movements, such as:  No bowel movement in 3 days.  Blood in the anal area, in your stool, or on the toilet paper.  Diarrhea for more than 24 hours.   Where can you learn more?  Go to https://www.[email or web address].net/patiented  Enter N004 in the search box to learn more about \"Chronic Pain: Care Instructions.\"  Current as of: July 31, 2024  Content Version: 14.3    2024 [facility] [employer].   Care instructions adapted under license by your healthcare professional. If you have questions about a medical " condition or this instruction, always ask your healthcare professional. [employer] disclaims any warranty or liability for your use of this information.

## 2025-02-03 NOTE — NURSING NOTE
Patient presents today for annual medicare wellness visit.    Medication Reconciliation Complete    Aydee Mcgregor LPN  2/3/2025 8:34 AM

## 2025-02-03 NOTE — PROGRESS NOTES
Preventive Care Visit  [facility] AND [facility]  Jemima Francis MD, Family Medicine  Feb 3, 2025      Assessment & Plan     Encounter for Medicare annual wellness exam  - Comprehensive Metabolic Panel; Future  - CBC and Differential; Future  - Lipid Panel; Future  - Comprehensive Metabolic Panel  - CBC and Differential  - Lipid Panel    Cervical radiculopathy due to intervertebral disc disorder  Chronic stable, refilled. Rare use of tylenol #3 since getting steroid injection   - celecoxib (CELEBREX) 200 MG capsule; Take 1 capsule (200 mg) by mouth daily.  - acetaminophen-codeine (TYLENOL #3) 300-30 MG per tablet; Take 2 tablets by mouth every 6 hours as needed for severe pain.    Left lumbar radiculopathy  See above   - celecoxib (CELEBREX) 200 MG capsule; Take 1 capsule (200 mg) by mouth daily.  - acetaminophen-codeine (TYLENOL #3) 300-30 MG per tablet; Take 2 tablets by mouth every 6 hours as needed for severe pain.    Encounter for screening mammogram for breast cancer  - MA Screen Bilateral w/Tello; Future    Plantar wart  Cryotherapy procedure note: After verbal consent and discussion of risks and benefits including but no limited to dyspigmentation/scar, blister, and pain, 1 was(were) treated with 1-2mm freeze border for 3 cycles with liquid nitrogen.  Advised if it worsens or does not resolve, consider biopsy   - DESTRUCT BENIGN LESION, UP TO 14                Counseling  Appropriate preventive services were addressed with this patient via screening, questionnaire, or discussion as appropriate for fall prevention, nutrition, physical activity, Tobacco-use cessation, social engagement, weight loss and cognition.  Checklist reviewing preventive services available has been given to the patient.  Reviewed patient's diet, addressing concerns and/or questions.   The patient was instructed to see the dentist every 6 months.   Patient reported safety concerns were addressed today.The patient was  provided with written information regarding signs of hearing loss.   Information on urinary incontinence and treatment options given to patient.   I have reviewed Opioid Use Disorder and Substance Use Disorder risk factors and made any needed referrals.           No follow-ups on file.    Subjective Corinne is a 75 year old, presenting for the following:  Medicare Visit            HPI  Due for annual wellness, med refills, labs  Due for mammo  Has wart on right forearm               1/30/2025   General Health   How would you rate your overall physical health? Good   Feel stress (tense, anxious, or unable to sleep) Only a little   (!) STRESS CONCERN      1/30/2025   Nutrition   Diet: Regular (no restrictions)         1/30/2025   Exercise   Days per week of moderate/strenous exercise 6 days   Average minutes spent exercising at this level 60 min         1/30/2025   Social Factors   Frequency of gathering with friends or relatives More than three times a week   Worry food won't last until get money to buy more No   Food not last or not have enough money for food? No   Do you have housing? (Housing is defined as stable permanent housing and does not include staying ouside in a car, in a tent, in an abandoned building, in an overnight shelter, or couch-surfing.) Yes   Are you worried about losing your housing? No   Lack of transportation? No   Unable to get utilities (heat,electricity)? No         1/30/2025   Fall Risk   Fallen 2 or more times in the past year? No   Trouble with walking or balance? No          1/30/2025   Activities of Daily Living- Home Safety   Needs help with the following daily activites None of the above   Safety concerns in the home Throw rugs in the hallway    No grab bars in the bathroom       Multiple values from one day are sorted in reverse-chronological order         1/30/2025   Dental   Dentist two times every year? (!) NO         1/30/2025   Hearing Screening   Hearing concerns? (!)  TROUBLE UNDERSTANDING SOFT OR WHISPERED SPEECH.         1/30/2025   Driving Risk Screening   Patient/family members have concerns about driving No         1/30/2025   General Alertness/Fatigue Screening   Have you been more tired than usual lately? No         1/30/2025   Urinary Incontinence Screening   Bothered by leaking urine in past 6 months Yes         1/30/2025   TB Screening   Were you born outside of the [place]? No         Today's PHQ-2 Score:       2/3/2025     8:27 AM   PHQ-2 ( 1999 [employer])   Q1: Little interest or pleasure in doing things 0   Q2: Feeling down, depressed or hopeless 0   PHQ-2 Score 0    Q1: Little interest or pleasure in doing things Not at all   Q2: Feeling down, depressed or hopeless Not at all   PHQ-2 Score 0       Patient-reported           1/30/2025   Substance Use   Alcohol more than 3/day or more than 7/wk No   Do you have a current opioid prescription? (!) YES   How severe/bad is pain from 1 to 10? 4/10   Do you use any other substances recreationally? No          No data to display              Low Risk (0-3)  Moderate Risk (4-7)  High Risk (>8)  Social History     Tobacco Use    Smoking status: Never    Smokeless tobacco: Never   Vaping Use    Vaping status: Never Used   Substance Use Topics    Alcohol use: Not Currently     Alcohol/week: 1.0 standard drink of alcohol     Comment:  once weekly she will have a glass of beer or wine at holiday gatherings    Drug use: No           1/30/2023   LAST FHS-7 RESULTS   1st degree relative breast or ovarian cancer Yes   Any relative bilateral breast cancer No   Any male have breast cancer No   Any ONE woman have BOTH breast AND ovarian cancer No   Any woman with breast cancer before 50yrs No   2 or more relatives with breast AND/OR ovarian cancer No   2 or more relatives with breast AND/OR bowel cancer Yes        Mammogram Screening - After age 74- determine frequency with patient based on health status, life expectancy and patient  "goals    ASCVD Risk   The 10-year ASCVD risk score (Michael SHERIFF, et al., 2019) is: 16.3%    Values used to calculate the score:      Age: 75 years      Sex: Female      Is Non- : No      Diabetic: No      Tobacco smoker: No      Systolic Blood Pressure: 128 mmHg      Is BP treated: No      HDL Cholesterol: 90 mg/dL      Total Cholesterol: 217 mg/dL            Reviewed and updated as needed this visit by Provider                      Current providers sharing in care for this patient include:  Patient Care Team:  Jemima Millard MD as PCP - General (Family Medicine)  Jemima Millard MD as Assigned PCP  Holley Dos Santos MD as Assigned Pain Medication Provider  Chaz Johnson MD as Assigned Surgical Provider    The following health maintenance items are reviewed in Epic and correct as of today:  Health Maintenance   Topic Date Due    ZOSTER IMMUNIZATION (2 of 3) 09/17/2012    LIPID  01/05/2023    INFLUENZA VACCINE (1) 09/01/2024    COVID-19 Vaccine (1 - 2024-25 season) Never done    RSV VACCINE (1 - 1-dose 75+ series) Never done    MEDICARE ANNUAL WELLNESS VISIT  01/31/2025    COLORECTAL CANCER SCREENING  12/10/2025    FALL RISK ASSESSMENT  02/03/2026    GLUCOSE  05/30/2027    ADVANCE CARE PLANNING  02/07/2029    DTAP/TDAP/TD IMMUNIZATION (2 - Td or Tdap) 11/09/2030    DEXA  02/28/2038    HEPATITIS C SCREENING  Completed    PHQ-2 (once per calendar year)  Completed    Pneumococcal Vaccine: 50+ Years  Completed    HPV IMMUNIZATION  Aged Out    MENINGITIS IMMUNIZATION  Aged Out    RSV MONOCLONAL ANTIBODY  Aged Out    MAMMO SCREENING  Discontinued         Review of Systems  Constitutional, HEENT, cardiovascular, pulmonary, gi and gu systems are negative, except as otherwise noted.     Objective    Exam  /82   Pulse 72   Temp 97.7  F (36.5  C)   Resp 16   Ht 1.543 m (5' 0.75\")   Wt 54.9 kg (121 lb)   SpO2 98%   BMI 23.05 kg/m     Estimated body mass index is " "23.05 kg/m  as calculated from the following:    Height as of this encounter: 1.543 m (5' 0.75\").    Weight as of this encounter: 54.9 kg (121 lb).    Physical Exam  GENERAL: alert and no distress  EYES: Eyes grossly normal to inspection, PERRL and conjunctivae and sclerae normal  HENT: ear canals and TM's normal, nose and mouth without ulcers or lesions  RESP: lungs clear to auscultation - no rales, rhonchi or wheezes  CV: regular rate and rhythm, normal S1 S2, no S3 or S4, no murmur, click or rub, no peripheral edema   SKIN: wart right forearm   PSYCH: mentation appears normal, affect normal/bright        2/3/2025   Mini Cog   Clock Draw Score 2 Normal   3 Item Recall 3 objects recalled   Mini Cog Total Score 5              Signed Electronically by: Jemima Francis MD    "

## 2025-03-03 ENCOUNTER — HOSPITAL ENCOUNTER (OUTPATIENT)
Dept: MAMMOGRAPHY | Facility: OTHER | Age: 75
Discharge: HOME | End: 2025-03-03
Attending: STUDENT IN AN ORGANIZED HEALTH CARE EDUCATION/TRAINING PROGRAM
Payer: MEDICARE

## 2025-03-03 ENCOUNTER — HOSPITAL ENCOUNTER (OUTPATIENT)
Dept: BONE DENSITY | Facility: OTHER | Age: 75
Discharge: HOME | End: 2025-03-03
Attending: INTERNAL MEDICINE
Payer: MEDICARE

## 2025-03-03 DIAGNOSIS — Z12.31 ENCOUNTER FOR SCREENING MAMMOGRAM FOR BREAST CANCER: ICD-10-CM

## 2025-03-03 DIAGNOSIS — M81.0 AGE-RELATED OSTEOPOROSIS WITHOUT CURRENT PATHOLOGICAL FRACTURE: ICD-10-CM

## 2025-03-03 PROCEDURE — 77063 BREAST TOMOSYNTHESIS BI: CPT

## 2025-03-03 PROCEDURE — 77080 DXA BONE DENSITY AXIAL: CPT

## (undated) RX ORDER — BETAMETHASONE SODIUM PHOSPHATE AND BETAMETHASONE ACETATE 3; 3 MG/ML; MG/ML
INJECTION, SUSPENSION INTRA-ARTICULAR; INTRALESIONAL; INTRAMUSCULAR; SOFT TISSUE
Status: DISPENSED
Start: 2024-10-01

## (undated) RX ORDER — LIDOCAINE HYDROCHLORIDE 10 MG/ML
INJECTION, SOLUTION INFILTRATION; PERINEURAL
Status: DISPENSED
Start: 2022-05-04

## (undated) RX ORDER — LIDOCAINE HYDROCHLORIDE 10 MG/ML
INJECTION, SOLUTION EPIDURAL; INFILTRATION; INTRACAUDAL; PERINEURAL
Status: DISPENSED
Start: 2018-07-23

## (undated) RX ORDER — LIDOCAINE HYDROCHLORIDE 10 MG/ML
INJECTION, SOLUTION INFILTRATION; PERINEURAL
Status: DISPENSED
Start: 2024-10-01

## (undated) RX ORDER — BETAMETHASONE SODIUM PHOSPHATE AND BETAMETHASONE ACETATE 3; 3 MG/ML; MG/ML
INJECTION, SUSPENSION INTRA-ARTICULAR; INTRALESIONAL; INTRAMUSCULAR; SOFT TISSUE
Status: DISPENSED
Start: 2022-05-04